# Patient Record
Sex: MALE | Race: WHITE | NOT HISPANIC OR LATINO | Employment: UNEMPLOYED | URBAN - METROPOLITAN AREA
[De-identification: names, ages, dates, MRNs, and addresses within clinical notes are randomized per-mention and may not be internally consistent; named-entity substitution may affect disease eponyms.]

---

## 2017-02-02 DIAGNOSIS — I10 HTN (HYPERTENSION): Primary | ICD-10-CM

## 2017-02-02 RX ORDER — ENALAPRIL MALEATE 20 MG/1
TABLET ORAL
Qty: 60 TABLET | Refills: 11 | Status: SHIPPED | OUTPATIENT
Start: 2017-02-02 | End: 2018-01-26

## 2017-02-27 DIAGNOSIS — E78.1 HYPERTRIGLYCERIDEMIA: ICD-10-CM

## 2017-02-27 DIAGNOSIS — Z21 ASYMPTOMATIC HUMAN IMMUNODEFICIENCY VIRUS (HIV) INFECTION STATUS (H): ICD-10-CM

## 2017-02-27 RX ORDER — FENOFIBRATE 54 MG/1
TABLET ORAL
Qty: 90 TABLET | Refills: 5 | Status: SHIPPED | OUTPATIENT
Start: 2017-02-27 | End: 2017-08-30

## 2017-02-27 RX ORDER — LEVOTHYROXINE SODIUM 100 UG/1
TABLET ORAL
Qty: 60 TABLET | Refills: 5 | Status: SHIPPED | OUTPATIENT
Start: 2017-02-27 | End: 2017-08-30

## 2017-02-27 RX ORDER — NEVIRAPINE 200 MG/1
TABLET ORAL
Qty: 60 TABLET | Refills: 11 | Status: SHIPPED | OUTPATIENT
Start: 2017-02-27 | End: 2018-02-08

## 2017-02-27 RX ORDER — LAMIVUDINE AND ZIDOVUDINE 150; 300 MG/1; MG/1
TABLET, FILM COATED ORAL
Qty: 60 TABLET | Refills: 11 | Status: SHIPPED | OUTPATIENT
Start: 2017-02-27 | End: 2018-02-08

## 2017-03-29 DIAGNOSIS — E34.9 HYPOTESTOSTERONISM: ICD-10-CM

## 2017-03-29 RX ORDER — TESTOSTERONE 30 MG/1.5ML
2 SOLUTION TOPICAL DAILY
Qty: 90 ML | Refills: 3 | Status: SHIPPED | OUTPATIENT
Start: 2017-03-29 | End: 2017-08-31

## 2017-04-24 DIAGNOSIS — I10 HYPERTENSION: ICD-10-CM

## 2017-04-24 RX ORDER — AMLODIPINE BESYLATE 5 MG/1
TABLET ORAL
Qty: 30 TABLET | Refills: 11 | Status: SHIPPED | OUTPATIENT
Start: 2017-04-24 | End: 2017-05-17 | Stop reason: ALTCHOICE

## 2017-05-12 ENCOUNTER — HOSPITAL ENCOUNTER (OUTPATIENT)
Facility: CLINIC | Age: 52
Setting detail: SPECIMEN
Discharge: HOME | End: 2017-05-12
Admitting: INTERNAL MEDICINE
Payer: MEDICARE

## 2017-05-12 DIAGNOSIS — B20 HUMAN IMMUNODEFICIENCY VIRUS (HIV) DISEASE (H): ICD-10-CM

## 2017-05-12 LAB
ALBUMIN SERPL-MCNC: 4.4 G/DL (ref 3.4–5)
ALP SERPL-CCNC: 62 U/L (ref 40–150)
ALT SERPL W P-5'-P-CCNC: 22 U/L (ref 0–70)
ANION GAP SERPL CALCULATED.3IONS-SCNC: 5 MMOL/L (ref 3–14)
AST SERPL W P-5'-P-CCNC: 17 U/L (ref 0–45)
BASOPHILS # BLD AUTO: 0.1 10E9/L (ref 0–0.2)
BASOPHILS NFR BLD AUTO: 0.8 %
BILIRUB SERPL-MCNC: 0.4 MG/DL (ref 0.2–1.3)
BUN SERPL-MCNC: 13 MG/DL (ref 7–30)
CALCIUM SERPL-MCNC: 9.1 MG/DL (ref 8.5–10.1)
CD3 CELLS # BLD: 2252 CELLS/UL (ref 603–2990)
CD3 CELLS NFR BLD: 88 % (ref 49–84)
CD3+CD4+ CELLS # BLD: 862 CELLS/UL (ref 441–2156)
CD3+CD4+ CELLS NFR BLD: 34 % (ref 28–63)
CD3+CD4+ CELLS/CD3+CD8+ CLL BLD: 0.59 % (ref 1.4–2.6)
CD3+CD8+ CELLS # BLD: 1483 CELLS/UL (ref 125–1312)
CD3+CD8+ CELLS NFR BLD: 58 % (ref 10–40)
CHLORIDE SERPL-SCNC: 107 MMOL/L (ref 94–109)
CO2 SERPL-SCNC: 28 MMOL/L (ref 20–32)
CREAT SERPL-MCNC: 1.08 MG/DL (ref 0.66–1.25)
DIFFERENTIAL METHOD BLD: ABNORMAL
EOSINOPHIL # BLD AUTO: 0.2 10E9/L (ref 0–0.7)
EOSINOPHIL NFR BLD AUTO: 2.7 %
ERYTHROCYTE [DISTWIDTH] IN BLOOD BY AUTOMATED COUNT: 13.2 % (ref 10–15)
GFR SERPL CREATININE-BSD FRML MDRD: 72 ML/MIN/1.7M2
GLUCOSE SERPL-MCNC: 107 MG/DL (ref 70–99)
HCT VFR BLD AUTO: 35.5 % (ref 40–53)
HGB BLD-MCNC: 12.4 G/DL (ref 13.3–17.7)
IFC SPECIMEN: ABNORMAL
IMM GRANULOCYTES # BLD: 0 10E9/L (ref 0–0.4)
IMM GRANULOCYTES NFR BLD: 0.1 %
IMMUNODEFICIENCY MARKERS SPEC-IMP: ABNORMAL
LIPASE SERPL-CCNC: 124 U/L (ref 73–393)
LYMPHOCYTES # BLD AUTO: 2.5 10E9/L (ref 0.8–5.3)
LYMPHOCYTES NFR BLD AUTO: 35.4 %
MCH RBC QN AUTO: 37.6 PG (ref 26.5–33)
MCHC RBC AUTO-ENTMCNC: 34.9 G/DL (ref 31.5–36.5)
MCV RBC AUTO: 108 FL (ref 78–100)
MONOCYTES # BLD AUTO: 0.5 10E9/L (ref 0–1.3)
MONOCYTES NFR BLD AUTO: 6.3 %
NEUTROPHILS # BLD AUTO: 3.9 10E9/L (ref 1.6–8.3)
NEUTROPHILS NFR BLD AUTO: 54.7 %
NRBC # BLD AUTO: 0 10*3/UL
NRBC BLD AUTO-RTO: 0 /100
PLATELET # BLD AUTO: 346 10E9/L (ref 150–450)
POTASSIUM SERPL-SCNC: 3.8 MMOL/L (ref 3.4–5.3)
PROT SERPL-MCNC: 7.4 G/DL (ref 6.8–8.8)
RBC # BLD AUTO: 3.3 10E12/L (ref 4.4–5.9)
SODIUM SERPL-SCNC: 141 MMOL/L (ref 133–144)
WBC # BLD AUTO: 7.1 10E9/L (ref 4–11)

## 2017-05-12 PROCEDURE — 36415 COLL VENOUS BLD VENIPUNCTURE: CPT | Performed by: INTERNAL MEDICINE

## 2017-05-16 ENCOUNTER — TELEPHONE (OUTPATIENT)
Dept: INFECTIOUS DISEASES | Facility: CLINIC | Age: 52
End: 2017-05-16

## 2017-05-16 LAB
HIV1 RNA # PLAS NAA DL=20: NORMAL {COPIES}/ML
HIV1 RNA SERPL NAA+PROBE-LOG#: NORMAL {LOG_COPIES}/ML

## 2017-05-17 ENCOUNTER — OFFICE VISIT (OUTPATIENT)
Dept: INFECTIOUS DISEASES | Facility: CLINIC | Age: 52
End: 2017-05-17
Attending: INTERNAL MEDICINE
Payer: MEDICARE

## 2017-05-17 VITALS
WEIGHT: 151.6 LBS | TEMPERATURE: 98 F | DIASTOLIC BLOOD PRESSURE: 91 MMHG | BODY MASS INDEX: 22.45 KG/M2 | SYSTOLIC BLOOD PRESSURE: 145 MMHG | HEART RATE: 93 BPM | HEIGHT: 69 IN

## 2017-05-17 DIAGNOSIS — I10 ESSENTIAL HYPERTENSION: ICD-10-CM

## 2017-05-17 DIAGNOSIS — E78.2 MIXED HYPERLIPIDEMIA: ICD-10-CM

## 2017-05-17 DIAGNOSIS — E29.1 HYPOGONADISM IN MALE: ICD-10-CM

## 2017-05-17 DIAGNOSIS — B20 HUMAN IMMUNODEFICIENCY VIRUS (HIV) DISEASE (H): Primary | ICD-10-CM

## 2017-05-17 DIAGNOSIS — E03.9 HYPOTHYROIDISM, UNSPECIFIED TYPE: ICD-10-CM

## 2017-05-17 PROCEDURE — 99213 OFFICE O/P EST LOW 20 MIN: CPT | Mod: ZF

## 2017-05-17 RX ORDER — AMLODIPINE BESYLATE 10 MG/1
10 TABLET ORAL DAILY
Qty: 30 TABLET | Refills: 11 | Status: SHIPPED | OUTPATIENT
Start: 2017-05-17 | End: 2018-02-08

## 2017-05-17 ASSESSMENT — PAIN SCALES - GENERAL: PAINLEVEL: NO PAIN (0)

## 2017-05-17 NOTE — NURSING NOTE
"Chief Complaint   Patient presents with     RECHECK     follow up with christopher YI cma       Initial BP (!) 145/91  Pulse 93  Temp 98  F (36.7  C) (Oral)  Ht 1.753 m (5' 9\")  Wt 68.8 kg (151 lb 9.6 oz)  BMI 22.39 kg/m2 Estimated body mass index is 22.39 kg/(m^2) as calculated from the following:    Height as of this encounter: 1.753 m (5' 9\").    Weight as of this encounter: 68.8 kg (151 lb 9.6 oz).  Medication Reconciliation: complete    "

## 2017-05-17 NOTE — MR AVS SNAPSHOT
After Visit Summary   5/17/2017    Dennys Bee    MRN: [id number]           Patient Information     Date Of Birth          1965        Visit Information        Provider Department      5/17/2017 5:00 PM Rc Latham MD [facility] and Infectious Diseases        Today's Diagnoses     Human immunodeficiency virus (HIV) disease (H)    -  1    Essential hypertension           Follow-ups after your visit        Follow-up notes from your care team     Return in about 6 months (around 11/17/2017).      Your next 10 appointments already scheduled     Oct 13, 2017  3:00 PM CDT   LAB with  LAB   [facility] ([facility])    [address] [place] [id number] [phone]           Patient must bring picture ID.  Patient should be prepared to give a urine specimen  Please do not eat 10-12 hours before your appointment if you are coming in fasting for labs on lipids, cholesterol, or glucose (sugar).  Pregnant women should follow their Care Team instructions. Water with medications is okay. Do not drink coffee or other fluids.   If you have concerns about taking  your medications, please ask at office or if scheduling via [employer], send a message by clicking on Secure Messaging, Message Your Care Team.            Oct 18, 2017  6:00 PM CDT   (Arrive by 5:45 PM)   Return Visit with Rc Latham MD   [facility] and Infectious Diseases ([facility])    [address] [place] [id number] [phone]              Who to contact     If you have questions or need follow up information about today's clinic visit or your schedule please contact [facility] AND INFECTIOUS DISEASES directly at [phone].  Normal or non-critical lab and imaging results will be communicated to you by MyChart, letter or phone within 4 business days after the clinic has received  "the results. If you do not hear from us within 7 days, please contact the clinic through [employer] or phone. If you have a critical or abnormal lab result, we will notify you by phone as soon as possible.  Submit refill requests through [employer] or call your pharmacy and they will forward the refill request to us. Please allow 3 business days for your refill to be completed.          Additional Information About Your Visit        [employer] Information     [employer] gives you secure access to your electronic health record. If you see a primary care provider, you can also send messages to your care team and make appointments. If you have questions, please call your primary care clinic.  If you do not have a primary care provider, please call [phone] and they will assist you.        Care EveryWhere ID     This is your Care EveryWhere ID. This could be used by other organizations to access your [facility] medical records  [id number]        Your Vitals Were     Pulse Temperature Height BMI (Body Mass Index)          93 98  F (36.7  C) (Oral) 1.753 m (5' 9\") 22.39 kg/m2         Blood Pressure from Last 3 Encounters:   05/17/17 (!) 145/91   11/16/16 (!) 157/91   05/18/16 129/77    Weight from Last 3 Encounters:   05/17/17 68.8 kg (151 lb 9.6 oz)   11/16/16 68.5 kg (151 lb)   05/18/16 69 kg (152 lb 3.2 oz)              Today, you had the following     No orders found for display         Today's Medication Changes          These changes are accurate as of: 5/17/17  5:46 PM.  If you have any questions, ask your nurse or doctor.               These medicines have changed or have updated prescriptions.        Dose/Directions    amLODIPine 10 MG tablet   Commonly known as:  NORVASC   This may have changed:  See the new instructions.   Used for:  Essential hypertension   Changed by:  Rc Latham MD        Dose:  10 mg   Take 1 tablet (10 mg) by mouth daily   Quantity:  30 tablet   Refills:  11            Where to get your " medicines      These medications were sent to [facility] access hospital - [place], [place] - [phone]  [phone], [facility] [address]    Hours:  TRANSPLANT PHONE NUMBER [phone] Phone:  [phone]     amLODIPine 10 MG tablet                Primary Care Provider Office Phone # Fax #    Rc Latham [phone]        [facility] [address]   [facility] [address]        Thank you!     Thank you for choosing [facility] AND INFECTIOUS DISEASES  for your care. Our goal is always to provide you with excellent care. Hearing back from our patients is one way we can continue to improve our services. Please take a few minutes to complete the written survey that you may receive in the mail after your visit with us. Thank you!             Your Updated Medication List - Protect others around you: Learn how to safely use, store and throw away your medicines at www.disposemymeds.org.          This list is accurate as of: 5/17/17  5:46 PM.  Always use your most recent med list.                   Brand Name Dispense Instructions for use    amLODIPine 10 MG tablet    NORVASC    30 tablet    Take 1 tablet (10 mg) by mouth daily       aspirin 81 MG tablet      Take 1 tablet by mouth daily.       buPROPion 150 MG 12 hr tablet    WELLBUTRIN SR     Take 150 mg by mouth 3 times daily.       * enalapril 10 MG tablet    VASOTEC    60 tablet    Take 2 tablets (20 mg) by mouth 2 times daily       * enalapril 20 MG tablet    VASOTEC    60 tablet    TAKE ONE TABLET BY MOUTH TWICE A DAY       fenofibrate 54 MG tablet     90 tablet    TAKE THREE TABLETS BY MOUTH EVERY DAY       lamiVUDine-zidovudine 150-300 MG per tablet    COMBIVIR    60 tablet    TAKE ONE TABLET BY MOUTH TWICE A DAY       LAMOTRIGINE PO      Take  by mouth. Take 2 and 1/2 tablets daily       levothyroxine 100 MCG tablet    SYNTHROID/LEVOTHROID    60 tablet    TAKE TWO TABLETS BY  MOUTH EVERY DAY       Multi-vitamin Tabs tablet      Take 1 tablet by mouth daily       nevirapine 200 MG tablet    VIRAMUNE    60 tablet    TAKE ONE TABLET BY MOUTH EVERY 12 HOURS       tadalafil 20 MG tablet    CIALIS    6 tablet    Take 1 tablet (20 mg) by mouth daily as needed       testosterone 30 MG/ACT topical solution    AXIRON    90 mL    Place 2 pumps (60 mg) onto the skin daily       UNABLE TO FIND      10 mg daily MEDICATION NAME: Brintellix       * Notice:  This list has 2 medication(s) that are the same as other medications prescribed for you. Read the directions carefully, and ask your doctor or other care provider to review them with you.

## 2017-05-23 NOTE — PROGRESS NOTES
Division of Infectious Diseases and International Medicine  Department of Medicine        [facility] OUTPATIENT VISIT NOTE [place] [address]  [address] [place] [address]  Office: [phone]  Fax:  [phone]     HISTORY OF PRESENT ILLNESS:    This 51 year old gentleman with asymptomatic HIV infection returns today to clinic to follow-up chronic antiretroviral therapy with Combivir one tablet PO BID plus nevirapine 200 mg PO BID (started 5/03) as well as other long-term medical concerns.  Since his last appointment here on 11/16/16 he believes he has not missed any Combivir / nevirapine doses and he reports no drug ill effects.  He had HIV laboratory studies drawn last week and is interested in the results.  Lipid, TSH, and testosterone studies were not drawn this time -- we will repeat those next time.   For hypothyroidism, he continues to take Synthroid 200 mcg / day (since 3/14) and also uses the topical testosterone replacement, Axiron, two 30 mg pumps for hypotestosteronism / erectile dysfunction without side effects or concerns.  His triglycerides on 11/11/16 were much improved (on ongoing fenofibrate 162 mg daily, he does not tolerate statins) versus pre-2015 when he was on high doses of testosterone replacement.  His agoraphobia, depression, and anxiety appear to be in stable control / remission and he continues to see his psychiatrist in [place], Dr. Johnston.  He remains on amlodipine 5 mg daily (since 5/20/15) plus lisinopril (20 mg PO BID since 1/15) but has not checked any recent out-of-office blood pressures.  He had not aerobically exercised very much in recent months.  His clinic check-in pressure today is again mildly elevated (145 / 91).  He generally feels healthy of late without new complaints including no fever, chills, night sweats, anorexia, increased fatigue, EENT symptoms, cough, dyspnea, chest pain, nausea, abdominal pain, diarrhea, rash, myalgias /  arthralgias, genitourinary symptoms, headache, or other neurological symptoms.    PAST MEDICAL HISTORY:    1. HIV, diagnosed 7/93, previously treated with AZT, 3TC and Crixivan. He was then on AZT plus efavirenz, but in 5/03 began Combivir/Sustiva and has remained virologically suppressed on this regimen since.  2. Stable chronic stage 1 kidney disease:  Nephrology Clinic evaluation (including negative MRI scan) unremarkable in 2009.  3. Hyperlipidemia previously requiring multiple medications, more hypertriglyceridemia than hypercholesterolemia -- now only on fenofibrate.   4. Hyperthyroidism on levothyroxine.   5. History of difficult-to-treat anxiety and depression -- followed for a number of years by psychiatrist Dr. Abebe Johnston in [place].   6. Hypertension treated with Vasotec.   7. Acute appendicitis with rupture in the summer of 2007.  8. Cholecystectomy in the summer of 2007.   9. 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  Followed there annually.   10. 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  Previously received Sculptra.   11. History of a low testosterone level, most recently checked in 1/09 with a normal free testosterone of 14.0 and a total testosterone in the normal range of 482, managed somewhat unorthodoxly by a now-retired Urologist until 7/13.  Since then, has continued taking biweekly home exogenous testosterone IM injections and has tapered them only marginally from ~ weekly to ~ every other week (or a bit less frequent).  Total testosterone level on 5/14/14 was 2926.  12. Syphilis diagnosed 5/08, peak titer of 1:256, completed a treatment course of IM penicillin times 3, last dose in 6/08.  13. Prior rectal warts, treated with Aldara cream.  14. Infected lower left cracked mandibular molar tooth, repaired  6/10.  15. Unintentional overdose of alcohol, hydrocodone, and clonazepam for which hospitalized in [place] in 10/12.  16. Mild intermittent controlled extensor limb psoriasis.  17. Sleep apnea, diagnosed elsewhere.    ALLERGIES:  PCN and iodine    CURRENT MEDICATIONS:  Current Outpatient Prescriptions   Medication Sig Dispense Refill     amLODIPine (NORVASC) 10 MG tablet Take 1 tablet (10 mg) by mouth daily 30 tablet 11     testosterone (AXIRON) 30 MG/ACT topical solution Place 2 pumps (60 mg) onto the skin daily 90 mL 3     fenofibrate 54 MG tablet TAKE THREE TABLETS BY MOUTH EVERY DAY 90 tablet 5     lamiVUDine-zidovudine (COMBIVIR) 150-300 MG per tablet TAKE ONE TABLET BY MOUTH TWICE A DAY 60 tablet 11     nevirapine (VIRAMUNE) 200 MG tablet TAKE ONE TABLET BY MOUTH EVERY 12 HOURS 60 tablet 11     levothyroxine (SYNTHROID/LEVOTHROID) 100 MCG tablet TAKE TWO TABLETS BY MOUTH EVERY DAY 60 tablet 5     enalapril (VASOTEC) 20 MG tablet TAKE ONE TABLET BY MOUTH TWICE A DAY 60 tablet 11     UNABLE TO FIND 10 mg daily MEDICATION NAME: Brintellix       enalapril (VASOTEC) 10 MG tablet Take 2 tablets (20 mg) by mouth 2 times daily 60 tablet 11     multivitamin, therapeutic with minerals (MULTI-VITAMIN) TABS Take 1 tablet by mouth daily       tadalafil (CIALIS) 20 MG tablet Take 1 tablet (20 mg) by mouth daily as needed 6 tablet 3     aspirin 81 MG tablet Take 1 tablet by mouth daily.       buPROPion (WELLBUTRIN SR) 150 MG 12 hr tablet Take 150 mg by mouth 3 times daily.       LAMOTRIGINE PO Take  by mouth. Take 2 and 1/2 tablets daily       FAMILY HISTORY:  Strong family history of depression.    SOCIAL HISTORY:  Lives alone in basement apartment in [place] abut spends much of his time at his parents home in [place] in the town he grew up in and where all his siblings still live.  Unemployed, on disability based on his depression diagnosis.  Sexually active, no single steady partner, mostly with other HIV  "seropositive men, but always discloses his status when with HIV seronegative partners and uses protection.  In the past, has visited friends in [place] for an extended stay.  Tobacco:  Long-standing, up to 2.5 ppd in the past.  Presently 1 ppd (but smokes only half of each cigarette).  Has quit for up to three weeks several times in the past; not interested in further attempts at reduction at present.    REVIEW OF SYSTEMS:  As per HPI.  Complete ROS is otherwise negative.    PHYSICAL EXAMINATION:  General:  A pleasant, conversant, WDWN 51-year-old man in no discomfort.  Vital Signs:  BP (!) 145/91  Pulse 93  Temp 98  F (36.7  C) (Oral)  Ht 1.753 m (5' 9\")  Wt 68.8 kg (151 lb 9.6 oz)  BMI 22.39 kg/m2 (weight stable).  Skin:  No acute rash or lesion.  Head/Neck:  NCAT.  Old buccal facial mild lipoatrophy is unchanged.  External auditory canals are patent bilaterally without discharge.  PERRL. EOMI.  Anicteric sclerae.  Conjunctivae are pink and uninjected.  Oropharynx shows no erythema, exudate or lesion.  Neck is supple without lymphadenopathy or thyromegaly.  Lungs:  Bilaterally clear to auscultation.  CV:  Regular rate and rhythm, normal S1, S2 without gallop, murmur or rub.  Abdomen:  Bowel sounds present, soft, nontender, no hepatomegaly, old surgical scar.  Back:  No low spinal or CVA tenderness.  Extremities:  Distally warm, no edema.  Neurological:  Alert, oriented, memory / cognition / affect intact.  Gait is normal    LABORATORY STUDIES (Reviewed with the patient during his appointment today):      Sodium 05/12/2017 141  133 - 144 mmol/L Final     Potassium 05/12/2017 3.8  3.4 - 5.3 mmol/L Final     Chloride 05/12/2017 107  94 - 109 mmol/L Final     Carbon Dioxide 05/12/2017 28  20 - 32 mmol/L Final     Anion Gap 05/12/2017 5  3 - 14 mmol/L Final     Glucose 05/12/2017 107* 70 - 99 mg/dL Final     Urea Nitrogen 05/12/2017 13  7 - 30 mg/dL Final     Creatinine 05/12/2017 1.08  0.66 - 1.25 mg/dL Final "     GFR Estimate 05/12/2017 72  >60 mL/min/1.7m2 Final    Non  GFR Calc     GFR Estimate If Black 05/12/2017 88  >60 mL/min/1.7m2 Final    African American GFR Calc     Calcium 05/12/2017 9.1  8.5 - 10.1 mg/dL Final     Bilirubin Total 05/12/2017 0.4  0.2 - 1.3 mg/dL Final     Albumin 05/12/2017 4.4  3.4 - 5.0 g/dL Final     Protein Total 05/12/2017 7.4  6.8 - 8.8 g/dL Final     Alkaline Phosphatase 05/12/2017 62  40 - 150 U/L Final     ALT 05/12/2017 22  0 - 70 U/L Final     AST 05/12/2017 17  0 - 45 U/L Final     WBC 05/12/2017 7.1  4.0 - 11.0 10e9/L Final     RBC Count 05/12/2017 3.30* 4.4 - 5.9 10e12/L Final     Hemoglobin 05/12/2017 12.4* 13.3 - 17.7 g/dL Final     Hematocrit 05/12/2017 35.5* 40.0 - 53.0 % Final     MCV 05/12/2017 108* 78 - 100 fl Final     MCH 05/12/2017 37.6* 26.5 - 33.0 pg Final     MCHC 05/12/2017 34.9  31.5 - 36.5 g/dL Final     RDW 05/12/2017 13.2  10.0 - 15.0 % Final     Platelet Count 05/12/2017 346  150 - 450 10e9/L Final     Diff Method 05/12/2017 Automated Method   Final     % Neutrophils 05/12/2017 54.7  % Final     % Lymphocytes 05/12/2017 35.4  % Final     % Monocytes 05/12/2017 6.3  % Final     % Eosinophils 05/12/2017 2.7  % Final     % Basophils 05/12/2017 0.8  % Final     % Immature Granulocytes 05/12/2017 0.1  % Final     Nucleated RBCs 05/12/2017 0  0 /100 Final     Absolute Neutrophil 05/12/2017 3.9  1.6 - 8.3 10e9/L Final     Absolute Lymphocytes 05/12/2017 2.5  0.8 - 5.3 10e9/L Final     Absolute Monocytes 05/12/2017 0.5  0.0 - 1.3 10e9/L Final     Absolute Eosinophils 05/12/2017 0.2  0.0 - 0.7 10e9/L Final     Absolute Basophils 05/12/2017 0.1  0.0 - 0.2 10e9/L Final     Abs Immature Granulocytes 05/12/2017 0.0  0 - 0.4 10e9/L Final     Absolute Nucleated RBC 05/12/2017 0.0   Final     HIV-1 RNA Quant Result 05/12/2017   HIVND [Copies]/mL Final                    Value:HIV-1 RNA Not Detected   The BEV AmpliPrep/BEV TaqMan HIV-1 test is an FDA-approved  in vitro nucleic   acid amplification test for the quantitation of HIV-1 RNA in human plasma (EDTA   plasma) using the BEV AmpliPrep instrument for automated viral nucleic acid   extraction and the BEV TaqMan Analyzer or BEV TaqMan for automated Real   Time PCR amplification and detection of the viral nucleic acid target.   Titer results are reported in copies/ml. This assay is intended for use in   conjunction with clinical presentation and other laboratory markers of disease   prognosis and for use as an aid in assessing viral response to antiretroviral   treatment as measured by changes in plasma HIV-1 RNA levels. This test should   not be used as a donor screening test to confirm the presence of HIV-1   infection.       HIV RNA QT Log 05/12/2017 Not Calculated  <1.3 [Log_copies]/mL Final     IFC Specimen 05/12/2017 Blood   Final     CD3 Mature T 05/12/2017 88* 49 - 84 % Final     CD4 Angora T 05/12/2017 34  28 - 63 % Final     CD8 Suppressor T 05/12/2017 58* 10 - 40 % Final     CD4:CD8 Ratio 05/12/2017 0.59* 1.40 - 2.60 Final     Absolute CD3 05/12/2017 2252  603 - 2990 cells/uL Final     Absolute CD4 05/12/2017 862  441 - 2156 cells/uL Final     Absolute CD8 05/12/2017 1483* 125 - 1312 cells/uL Final     T Cell Subset Profile Antibody Int* 05/12/2017 << Do Not Report >>   Final     Lipase 05/12/2017 124  73 - 393 U/L Final     ASSESSMENT/PLAN:    1. Stable HIV infection:  On long-standing Combivir / nevirapine he maintains a normal absolute CD4+ cell count and undetectable HIV plasma viral load with tight adherence and good drug tolerance.  He will continue on this regimen and return to this clinic for follow-up in about six months, or as needed before then.  I pre-ordered his laboratory studies for his next pre-appointment lab draw today.  2. Essential hypertension:  Two recent check-in blood pressures have been mildly today despite continued amlodipine 5 mg PO daily (since 5/20/15) plus enalapril 20 mg  PO BID (dose increased on 1/27/15) -- I do not have out-of-office blood pressure reading from him to evaluate.  After discussion today, it seems prudent to increase his amlodipine dose to 10 mg PO daily (prescription sent) and continue the same enalapril dose.  I asked him to check three home blood pressures over the next month on this increased dose and transmit them to me and also to let me know right away if he develops dizziness, lightheadedness, or other possible symptoms of hypotension.  Once again, I promoted taking up a regular aerobic exercise plan during these coming warmer months.  3. Treated hypothyroidism:  He feels well and his TSH was normal on 11/11/16 -- will continue the same levothyroxine dose of 200 mcg / day dose (which was lowered in 3/14) and recheck a TSH in six months.  4. Testosterone replacement therapy / previous erectile dysfunction:  His most recent total testosterone level on 11/11/16 was excellent at 522 and he notices no symptoms of hypotestosteronism on Axiron  transcutaneous testosterone topical replacement therapy at two (30 mg) pumps (= 60 mg) daily (since late 1/15).  Will continue that and recheck an ~ annual total testosterone at next blood draw.  5. Fairly well-controlled hyperlipidemia:  His 11/11/16 total cholesterol (217) and LDL (126) were stable versus the last check and improved complared to 1/16/15 for uncertain reasons (although perhaps related to switched psychiatric medications and to some mild weight loss).  His fasting triglycerides were also improved at 127 (and dramatically better than a decade ago) on ongoing fenofibrate 162 mg daily.  He is not on a statin (with past poor tolerance) and does not wish to try any statin again.  As above, encouraged regular aerobic exercise.  6. Chronic, intractable depression / anxiety:  By conversation, he seems to me to be in some degree of remission over the past 1.5 years.  Remains, I believe, on armodafinil (Nuvigil) since  early 1/14 through Dr. Johnston, as well as bupropion and lamotrigine.  7. Improved previously elevated creatinine:  He has a history of mild chronic kidney insufficiency with past creatinines usually in the 1.2 - 1.5 range since 2005, but the creatinine has been lower at ~ 1.1 over the past 1.5 years (perhaps because he is no longer taking supra-therapeutic testosterone doses).  8. History of prior mild mid-winter vitamin D deficiency:  He self-takes OTC vitamin D supplements most white including this past winter, although whether he needs that is questionable, because a  11/12/15 vitamin D level was adequate at 24.  We discussed that he may not really need such supplementation, but he does not take enough to be harmful.  9. Mild psoriasis:  Well-controlled with topical Clobex prn.  Not discussed today.  10. Health Care Maintenance:  Influenza vaccine given 11/16/16.  Menactra vaccine given 11/18/15.  Tdap was boosted 1/21/15.  Prevnar 13 given 10/16/13; Pneumovax given 1/21/15.  HAV / HBV immunized / seroimmune.  HCV negative 6/1/09.  Negative rectal Pap done by Dr. Sanchez 6/28/10.  Repeat RPR was negative on 11/12/15; will repeat with next blood draw (had a positive antitreponemal antibody on 8/27/09).  Quantiferon Gold was negative 11/12/15.  Sees a dentist > annually.  Sees a sleep apnea specialist for Cpap.  Saw an ophthalmologist in early 2012.

## 2017-08-30 DIAGNOSIS — E78.1 HYPERTRIGLYCERIDEMIA: ICD-10-CM

## 2017-08-30 RX ORDER — LEVOTHYROXINE SODIUM 100 UG/1
TABLET ORAL
Qty: 60 TABLET | Refills: 11 | Status: SHIPPED | OUTPATIENT
Start: 2017-08-30 | End: 2018-09-06

## 2017-08-30 RX ORDER — FENOFIBRATE 54 MG/1
TABLET ORAL
Qty: 90 TABLET | Refills: 11 | Status: SHIPPED | OUTPATIENT
Start: 2017-08-30 | End: 2018-02-08

## 2017-08-31 DIAGNOSIS — E34.9 HYPOTESTOSTERONISM: ICD-10-CM

## 2017-08-31 RX ORDER — TESTOSTERONE 30 MG/1.5ML
2 SOLUTION TOPICAL DAILY
Qty: 90 ML | Refills: 3 | Status: SHIPPED | OUTPATIENT
Start: 2017-08-31 | End: 2017-12-29

## 2017-10-13 DIAGNOSIS — B20 HUMAN IMMUNODEFICIENCY VIRUS (HIV) DISEASE (H): ICD-10-CM

## 2017-10-13 DIAGNOSIS — E29.1 HYPOGONADISM IN MALE: ICD-10-CM

## 2017-10-13 DIAGNOSIS — E03.9 HYPOTHYROIDISM, UNSPECIFIED TYPE: ICD-10-CM

## 2017-10-13 DIAGNOSIS — E78.2 MIXED HYPERLIPIDEMIA: ICD-10-CM

## 2017-10-13 LAB
ALBUMIN SERPL-MCNC: 4.4 G/DL (ref 3.4–5)
ALP SERPL-CCNC: 70 U/L (ref 40–150)
ALT SERPL W P-5'-P-CCNC: 35 U/L (ref 0–70)
ANION GAP SERPL CALCULATED.3IONS-SCNC: 9 MMOL/L (ref 3–14)
AST SERPL W P-5'-P-CCNC: 22 U/L (ref 0–45)
BASOPHILS # BLD AUTO: 0.1 10E9/L (ref 0–0.2)
BASOPHILS NFR BLD AUTO: 1 %
BILIRUB SERPL-MCNC: 0.6 MG/DL (ref 0.2–1.3)
BUN SERPL-MCNC: 14 MG/DL (ref 7–30)
CALCIUM SERPL-MCNC: 9 MG/DL (ref 8.5–10.1)
CD3 CELLS # BLD: 2452 CELLS/UL (ref 603–2990)
CD3 CELLS NFR BLD: 89 % (ref 49–84)
CD3+CD4+ CELLS # BLD: 964 CELLS/UL (ref 441–2156)
CD3+CD4+ CELLS NFR BLD: 35 % (ref 28–63)
CD3+CD4+ CELLS/CD3+CD8+ CLL BLD: 0.6 % (ref 1.4–2.6)
CD3+CD8+ CELLS # BLD: 1586 CELLS/UL (ref 125–1312)
CD3+CD8+ CELLS NFR BLD: 58 % (ref 10–40)
CHLORIDE SERPL-SCNC: 105 MMOL/L (ref 94–109)
CHOLEST SERPL-MCNC: 225 MG/DL
CO2 SERPL-SCNC: 24 MMOL/L (ref 20–32)
CREAT SERPL-MCNC: 1.15 MG/DL (ref 0.66–1.25)
DIFFERENTIAL METHOD BLD: ABNORMAL
EOSINOPHIL # BLD AUTO: 0.3 10E9/L (ref 0–0.7)
EOSINOPHIL NFR BLD AUTO: 3.2 %
ERYTHROCYTE [DISTWIDTH] IN BLOOD BY AUTOMATED COUNT: 12.9 % (ref 10–15)
GFR SERPL CREATININE-BSD FRML MDRD: 67 ML/MIN/1.7M2
GLUCOSE SERPL-MCNC: 100 MG/DL (ref 70–99)
HCT VFR BLD AUTO: 35.2 % (ref 40–53)
HDLC SERPL-MCNC: 61 MG/DL
HGB BLD-MCNC: 12.5 G/DL (ref 13.3–17.7)
IFC SPECIMEN: ABNORMAL
IMM GRANULOCYTES # BLD: 0 10E9/L (ref 0–0.4)
IMM GRANULOCYTES NFR BLD: 0.2 %
LDLC SERPL CALC-MCNC: 125 MG/DL
LIPASE SERPL-CCNC: 148 U/L (ref 73–393)
LYMPHOCYTES # BLD AUTO: 2.9 10E9/L (ref 0.8–5.3)
LYMPHOCYTES NFR BLD AUTO: 34.8 %
MCH RBC QN AUTO: 37.5 PG (ref 26.5–33)
MCHC RBC AUTO-ENTMCNC: 35.5 G/DL (ref 31.5–36.5)
MCV RBC AUTO: 106 FL (ref 78–100)
MONOCYTES # BLD AUTO: 0.5 10E9/L (ref 0–1.3)
MONOCYTES NFR BLD AUTO: 6.1 %
NEUTROPHILS # BLD AUTO: 4.5 10E9/L (ref 1.6–8.3)
NEUTROPHILS NFR BLD AUTO: 54.7 %
NONHDLC SERPL-MCNC: 164 MG/DL
NRBC # BLD AUTO: 0 10*3/UL
NRBC BLD AUTO-RTO: 0 /100
PLATELET # BLD AUTO: 335 10E9/L (ref 150–450)
POTASSIUM SERPL-SCNC: 3.9 MMOL/L (ref 3.4–5.3)
PROT SERPL-MCNC: 7.7 G/DL (ref 6.8–8.8)
RBC # BLD AUTO: 3.33 10E12/L (ref 4.4–5.9)
SODIUM SERPL-SCNC: 138 MMOL/L (ref 133–144)
TRIGL SERPL-MCNC: 196 MG/DL
TSH SERPL DL<=0.005 MIU/L-ACNC: 0.75 MU/L (ref 0.4–4)
WBC # BLD AUTO: 8.2 10E9/L (ref 4–11)

## 2017-10-16 LAB — RPR SER-TITR: NEGATIVE {TITER}

## 2017-10-17 LAB
HIV1 RNA # PLAS NAA DL=20: NORMAL {COPIES}/ML
HIV1 RNA SERPL NAA+PROBE-LOG#: NORMAL {LOG_COPIES}/ML
TESTOST SERPL-MCNC: 392 NG/DL (ref 240–950)

## 2017-10-18 ENCOUNTER — OFFICE VISIT (OUTPATIENT)
Dept: INFECTIOUS DISEASES | Facility: CLINIC | Age: 52
End: 2017-10-18
Attending: INTERNAL MEDICINE
Payer: MEDICARE

## 2017-10-18 VITALS
DIASTOLIC BLOOD PRESSURE: 87 MMHG | BODY MASS INDEX: 23.25 KG/M2 | HEIGHT: 69 IN | TEMPERATURE: 98.3 F | SYSTOLIC BLOOD PRESSURE: 130 MMHG | HEART RATE: 89 BPM | WEIGHT: 157 LBS

## 2017-10-18 DIAGNOSIS — N40.1 BENIGN PROSTATIC HYPERPLASIA WITH NOCTURIA: ICD-10-CM

## 2017-10-18 DIAGNOSIS — Z00.00 HEALTH CARE MAINTENANCE: ICD-10-CM

## 2017-10-18 DIAGNOSIS — R07.89 CHEST HEAVINESS: ICD-10-CM

## 2017-10-18 DIAGNOSIS — R35.1 NOCTURIA: ICD-10-CM

## 2017-10-18 DIAGNOSIS — Z21 HUMAN IMMUNODEFICIENCY VIRUS I INFECTION (H): Primary | ICD-10-CM

## 2017-10-18 DIAGNOSIS — R00.0 TACHYCARDIA: ICD-10-CM

## 2017-10-18 DIAGNOSIS — Z23 NEED FOR VACCINATION: ICD-10-CM

## 2017-10-18 DIAGNOSIS — R35.1 BENIGN PROSTATIC HYPERPLASIA WITH NOCTURIA: ICD-10-CM

## 2017-10-18 DIAGNOSIS — I10 ESSENTIAL HYPERTENSION: ICD-10-CM

## 2017-10-18 LAB
ALBUMIN UR-MCNC: NEGATIVE MG/DL
APPEARANCE UR: CLEAR
BILIRUB UR QL STRIP: NEGATIVE
COLOR UR AUTO: YELLOW
GLUCOSE UR STRIP-MCNC: NEGATIVE MG/DL
HGB UR QL STRIP: NEGATIVE
KETONES UR STRIP-MCNC: NEGATIVE MG/DL
LEUKOCYTE ESTERASE UR QL STRIP: NEGATIVE
MUCOUS THREADS #/AREA URNS LPF: PRESENT /LPF
NITRATE UR QL: NEGATIVE
PH UR STRIP: 6 PH (ref 5–7)
RBC #/AREA URNS AUTO: 2 /HPF (ref 0–2)
SOURCE: ABNORMAL
SP GR UR STRIP: 1.01 (ref 1–1.03)
UROBILINOGEN UR STRIP-MCNC: 0 MG/DL (ref 0–2)
WBC #/AREA URNS AUTO: 2 /HPF (ref 0–2)

## 2017-10-18 PROCEDURE — G0008 ADMIN INFLUENZA VIRUS VAC: HCPCS | Mod: ZF

## 2017-10-18 PROCEDURE — 81001 URINALYSIS AUTO W/SCOPE: CPT | Performed by: INTERNAL MEDICINE

## 2017-10-18 PROCEDURE — 25000128 H RX IP 250 OP 636: Mod: ZF | Performed by: INTERNAL MEDICINE

## 2017-10-18 PROCEDURE — 99213 OFFICE O/P EST LOW 20 MIN: CPT | Mod: 25,ZF

## 2017-10-18 PROCEDURE — 90686 IIV4 VACC NO PRSV 0.5 ML IM: CPT | Mod: ZF | Performed by: INTERNAL MEDICINE

## 2017-10-18 PROCEDURE — 93010 ELECTROCARDIOGRAM REPORT: CPT | Performed by: INTERNAL MEDICINE

## 2017-10-18 PROCEDURE — 93005 ELECTROCARDIOGRAM TRACING: CPT | Mod: ZF

## 2017-10-18 RX ADMIN — INFLUENZA A VIRUS A/MICHIGAN/45/2015 X-275 (H1N1) ANTIGEN (FORMALDEHYDE INACTIVATED), INFLUENZA A VIRUS A/HONG KONG/4801/2014 X-263B (H3N2) ANTIGEN (FORMALDEHYDE INACTIVATED), INFLUENZA B VIRUS B/PHUKET/3073/2013 ANTIGEN (FORMALDEHYDE INACTIVATED), AND INFLUENZA B VIRUS B/BRISBANE/60/2008 ANTIGEN (FORMALDEHYDE INACTIVATED) 0.5 ML: 15; 15; 15; 15 INJECTION, SUSPENSION INTRAMUSCULAR at 20:01

## 2017-10-18 ASSESSMENT — PAIN SCALES - GENERAL: PAINLEVEL: MILD PAIN (3)

## 2017-10-18 NOTE — MR AVS SNAPSHOT
After Visit Summary   10/18/2017    Dennys Bee    MRN: [id number]           Patient Information     Date Of Birth          1965        Visit Information        Provider Department      10/18/2017 6:00 PM Rc Latham MD [facility] and Infectious Diseases        Today's Diagnoses     Health care maintenance    -  1    Need for vaccination        Tachycardia        Chest heaviness        Nocturia        Human immunodeficiency virus I infection (H)           Follow-ups after your visit        Follow-up notes from your care team     Return in about 6 months (around 4/18/2018).      Your next 10 appointments already scheduled     Oct 18, 2017  8:00 PM CDT   (Arrive by 7:45 PM)   XR CHEST 2 VIEWS with XR1   [facility] ([facility])    [address] [place] [address] [phone]           Please bring a list of your current medicines to your exam. (Include vitamins, minerals and over-thecounter medicines.) Leave your valuables at home.  Tell your doctor if there is a chance you may be pregnant.  You do not need to do anything special for this exam.            Apr 13, 2018  1:00 PM CDT   LAB with  LAB   [facility] ([facility])    [address] [place] [address] [phone]           Patient must bring picture ID. Patient should be prepared to give a urine specimen  Please do not eat 10-12 hours before your appointment if you are coming in fasting for labs on lipids, cholesterol, or glucose (sugar). Pregnant women should follow their Care Team instructions. Water with medications is okay. Do not drink coffee or other fluids. If you have concerns about taking  your medications, please ask at office or if scheduling via [employer], send a message by clicking on Secure Messaging, Message Your Care Team.            Apr 18, 2018  6:00 PM CDT   (Arrive  "by 5:45 PM)   Return Visit with Rc Latham MD   [facility] and Infectious Diseases ([facility])    [address]  3rd Floor  [facility] [address] [phone]              Future tests that were ordered for you today     Open Future Orders        Priority Expected Expires Ordered    X-ray Chest 2 vws* Routine 10/18/2017 10/18/2018 10/18/2017    Routine UA with micro - Reflex to culture Routine  10/18/2018 10/18/2017            Who to contact     If you have questions or need follow up information about today's clinic visit or your schedule please contact [facility] directly at [phone].  Normal or non-critical lab and imaging results will be communicated to you by Lalalamahart, letter or phone within 4 business days after the clinic has received the results. If you do not hear from us within 7 days, please contact the clinic through [employer] or phone. If you have a critical or abnormal lab result, we will notify you by phone as soon as possible.  Submit refill requests through [employer]Me[employer] or call your pharmacy and they will forward the refill request to us. Please allow 3 business days for your refill to be completed.          Additional Information About Your Visit        SetMeUp Information     [employer] gives you secure access to your electronic health record. If you see a primary care provider, you can also send messages to your care team and make appointments. If you have questions, please call your primary care clinic.  If you do not have a primary care provider, please call [phone] and they will assist you.        Care EveryWhere ID     This is your Care EveryWhere ID. This could be used by other organizations to access your [facility] medical records  [id number]        Your Vitals Were     Pulse Temperature Height BMI (Body Mass Index)          89 98.3  F (36.8  C) (Oral) 1.753 m (5' 9\") 23.18 kg/m2         Blood " Pressure from Last 3 Encounters:   10/18/17 130/87   05/17/17 (!) 145/91   11/16/16 (!) 157/91    Weight from Last 3 Encounters:   10/18/17 71.2 kg (157 lb)   05/17/17 68.8 kg (151 lb 9.6 oz)   11/16/16 68.5 kg (151 lb)              We Performed the Following     EKG 12-lead complete w/read - Clinics          Today's Medication Changes          These changes are accurate as of: 10/18/17  7:26 PM.  If you have any questions, ask your nurse or doctor.               These medicines have changed or have updated prescriptions.        Dose/Directions    enalapril 20 MG tablet   Commonly known as:  VASOTEC   This may have changed:  Another medication with the same name was removed. Continue taking this medication, and follow the directions you see here.   Used for:  HTN (hypertension)   Changed by:  Rc Latham MD        TAKE ONE TABLET BY MOUTH TWICE A DAY   Quantity:  60 tablet   Refills:  11         Stop taking these medicines if you haven't already. Please contact your care team if you have questions.     UNABLE TO FIND   Stopped by:  Rc Latham MD                    Primary Care Provider Office Phone # Fax #    Rc Latham [phone]       [address] [place] [address]        Equal Access to Services     POLLO MILTON AH: Hadkimberly leeo Soomaali, waaxda luqadaha, qaybta kaalmada adeegyada, trell guerrero. So [facility] [phone].    ATENCIÓN: Si habla español, tiene a solomon disposición servicios gratuitos de asistencia lingüística. Llame al [phone].    We comply with applicable federal civil rights laws and [place] laws. We do not discriminate on the basis of race, color, national origin, age, disability, sex, sexual orientation, or gender identity.            Thank you!     Thank you for choosing [facility] AND INFECTIOUS DISEASES  for your care. Our goal is always to provide you with excellent care. Hearing back from  our patients is one way we can continue to improve our services. Please take a few minutes to complete the written survey that you may receive in the mail after your visit with us. Thank you!             Your Updated Medication List - Protect others around you: Learn how to safely use, store and throw away your medicines at www.disposemymeds.org.          This list is accurate as of: 10/18/17  7:26 PM.  Always use your most recent med list.                   Brand Name Dispense Instructions for use Diagnosis    amLODIPine 10 MG tablet    NORVASC    30 tablet    Take 1 tablet (10 mg) by mouth daily    Essential hypertension       aspirin 81 MG tablet      Take 1 tablet by mouth daily.        buPROPion 150 MG 12 hr tablet    WELLBUTRIN SR     Take 150 mg by mouth 3 times daily.        enalapril 20 MG tablet    VASOTEC    60 tablet    TAKE ONE TABLET BY MOUTH TWICE A DAY    HTN (hypertension)       fenofibrate 54 MG tablet     90 tablet    TAKE THREE TABLETS BY MOUTH EVERY DAY    Hypertriglyceridemia       lamiVUDine-zidovudine 150-300 MG per tablet    COMBIVIR    60 tablet    TAKE ONE TABLET BY MOUTH TWICE A DAY    Asymptomatic human immunodeficiency virus (HIV) infection status (H)       LAMOTRIGINE PO      Take  by mouth. Take 2 and 1/2 tablets daily        levothyroxine 100 MCG tablet    SYNTHROID/LEVOTHROID    60 tablet    TAKE TWO TABLETS BY MOUTH EVERY DAY    Hypertriglyceridemia       Multi-vitamin Tabs tablet      Take 1 tablet by mouth daily        nevirapine 200 MG tablet    VIRAMUNE    60 tablet    TAKE ONE TABLET BY MOUTH EVERY 12 HOURS    Asymptomatic human immunodeficiency virus (HIV) infection status (H)       tadalafil 20 MG tablet    CIALIS    6 tablet    Take 1 tablet (20 mg) by mouth daily as needed    Erectile dysfunction       testosterone 30 MG/ACT topical solution    AXIRON    90 mL    Place 2 pumps (60 mg) onto the skin daily    Hypotestosteronism

## 2017-10-18 NOTE — NURSING NOTE
"Chief Complaint   Patient presents with     RECHECK     follow up with B20, tzimmer cma       Initial /87  Pulse 89  Temp 98.3  F (36.8  C) (Oral)  Ht 1.753 m (5' 9\")  Wt 71.2 kg (157 lb)  BMI 23.18 kg/m2 Estimated body mass index is 23.18 kg/(m^2) as calculated from the following:    Height as of this encounter: 1.753 m (5' 9\").    Weight as of this encounter: 71.2 kg (157 lb).  Medication Reconciliation: complete    "

## 2017-10-19 NOTE — NURSING NOTE
Dennys Bee      1.  Has the patient received the information for the influenza vaccine? YES    2.  Does the patient have any of the following contraindications?     Allergy to eggs? No     Allergic reaction to previous influenza vaccines? No     Any other problems to previous influenza vaccines? No     Paralyzed by Guillain-[place] syndrome? No     Currently pregnant? NO     Current moderate or severe illness? No     Allergy to contact lens solution? No    3.  The vaccine has been administered in the usual fashion and the patient was instructed to wait 20 minutes before leaving the building in the event of an allergic reaction: YES    Vaccination given by christopher hua.  Recorded by Dionne Sanabria

## 2017-10-30 LAB — INTERPRETATION ECG - MUSE: NORMAL

## 2017-11-27 NOTE — PROGRESS NOTES
"Division of Infectious Diseases and International Medicine  Department of Medicine        [facility] OUTPATIENT VISIT NOTE [place] Mail Code [address] [place] [address]  Office: [phone]  Fax:  [phone]     HISTORY OF PRESENT ILLNESS:  Mr. Bee is a 52 year old gentleman with asymptomatic HIV infection.  He returns today to [facility] for follow-up of his antiretroviral therapy comprised of Combivir one tablet PO BID and nevirapine 200 mg PO BID (started ) as well as follow-up of other long-term health issues.  Since his last appointment here on 17 he reports he has not missed any antiretorival drug doses.  He lacks medication side effects.  He has generally felt well over the past half-year, but has a couple new concerns.  First, he has noticed an infrequent left chest sensation of \"fullness\" over the past year or so and his chest sometimes feels tight with a deep breath.  This is not associated with exertion and is not accompanied by helen pain, dyspnea, nausea, sweats, or any other symptom.  It resolves spontaneously after a brief period.  It tends to occur more noticeably when he thinks about it.  It happens maybe a couple of times a month and does not seem to be increasing in frequency, duration, or intensity over the past year, but he is concerned about it because his maternal grandfather  suddenly of a myocardial infarction at age 51 and there is a strong family history of hypercholesterolemia.  He also has noticed bouts of rapid heart beat that he can feel when he palpates his pulse over recent months.  This seems to happen daily.  He thinks at these times his heart rate is well over 130, but he is not sure he is counting correctly.  Sometimes he thinks he can feel it as palpitations.  This tends to occur when he is anxious but is not associated with exertion, any particular time of day, or any particular activity.  He has also noticed an " occasional dull ache on the left side of his scrotum lately and wonders if this might be epididymitis, which he thinks he was previously treated for by his (license-suspended) former urologist a number of years ago.  He has continued to have nocturia several times per night over the past year or so -- he thinks his bladder is not fully emptying.  With a history of anxiety and insomnia, he tends to be a light sleeper with frequent awakenings, however, so he is not certain whether an urge to void is waking him or rather that he wakes and then decides he needs to void (probably the latter).  He lacks daytime urinary frequency and also has no dysuria, daytime urgency, hematuria, prostatic ache, or genital discharge.  For his chronic hypothyroidism, he continues to take Synthroid 200 mcg / day (since 3/14) and also uses the topical testosterone replacement, Axiron, two 30 mg pumps for hypotestosteronism / erectile dysfunction without side effects or concerns.  He continues to have poor libido and sexual function.  His triglycerides on 11/11/16 were much improved (on ongoing fenofibrate 162 mg daily, he does not tolerate statins) versus pre-2015 when he was on high doses of testosterone replacement.  His agoraphobia, depression, and anxiety appear to be in stable control / remission and he continues to see his psychiatrist in [place], Dr. Johnston.  He is on an increased dose of amlodipine 10 mg daily (increased 5/17/17, started 5/20/15) plus lisinopril (20 mg PO BID since 1/15) but has not checked any recent out-of-office blood pressures.  He had not aerobically exercised very much in recent months.  His clinic check-in pressure today is again mildly elevated (145 / 91).  Beyond all these issues, he has otherweise felt well lately and no additional complaints including no recent fever, chills, night sweats, anorexia, increased fatigue, EENT symptoms, cough, resting or exertional dyspnea, helen chest pain, nausea, abdominal  pain, diarrhea, rash, myalgias / arthralgias, other genitourinary symptoms, headache, or other neurological symptoms.    PAST MEDICAL HISTORY:    1. HIV, diagnosed 7/93, previously treated with AZT, 3TC and Crixivan. He was then on AZT plus efavirenz, but in 5/03 began Combivir/Sustiva and has remained virologically suppressed on this regimen since.  2. Stable chronic stage 1 kidney disease:  Nephrology Clinic evaluation (including negative MRI scan) unremarkable in 2009.  3. Hyperlipidemia previously requiring multiple medications, more hypertriglyceridemia than hypercholesterolemia -- now only on fenofibrate.   4. Hyperthyroidism on levothyroxine.   5. History of difficult-to-treat anxiety and depression -- followed for a number of years by psychiatrist Dr. Abebe Johnston in [place].   6. Hypertension treated with Vasotec.   7. Acute appendicitis with rupture in the summer of 2007.  8. Cholecystectomy in the summer of 2007.   9. 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  Followed there annually.   10. 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  Previously received Sculptra.   11. History of a low testosterone level, most recently checked in 1/09 with a normal free testosterone of 14.0 and a total testosterone in the normal range of 482, managed somewhat unorthodoxly by a now-retired Urologist until 7/13.  Since then, has continued taking biweekly home exogenous testosterone IM injections and has tapered them only marginally from ~ weekly to ~ every other week (or a bit less frequent).  Total testosterone level on 5/14/14 was 2926.  12. Syphilis diagnosed 5/08, peak titer of 1:256, completed a treatment course of IM penicillin times 3, last dose in 6/08.  13. Prior rectal warts, treated with Aldara cream.  14. Infected lower left  cracked mandibular molar tooth, repaired 6/10.  15. Unintentional overdose of alcohol, hydrocodone, and clonazepam for which hospitalized in [place] in 10/12.  16. Mild intermittent controlled extensor limb psoriasis.  17. Sleep apnea, diagnosed elsewhere.    ALLERGIES:  PCN and iodine    CURRENT MEDICATIONS:  Current Outpatient Prescriptions   Medication Sig Dispense Refill     testosterone (AXIRON) 30 MG/ACT topical solution Place 2 pumps (60 mg) onto the skin daily 90 mL 3     fenofibrate 54 MG tablet TAKE THREE TABLETS BY MOUTH EVERY DAY 90 tablet 11     levothyroxine (SYNTHROID/LEVOTHROID) 100 MCG tablet TAKE TWO TABLETS BY MOUTH EVERY DAY 60 tablet 11     amLODIPine (NORVASC) 10 MG tablet Take 1 tablet (10 mg) by mouth daily 30 tablet 11     lamiVUDine-zidovudine (COMBIVIR) 150-300 MG per tablet TAKE ONE TABLET BY MOUTH TWICE A DAY 60 tablet 11     nevirapine (VIRAMUNE) 200 MG tablet TAKE ONE TABLET BY MOUTH EVERY 12 HOURS 60 tablet 11     enalapril (VASOTEC) 20 MG tablet TAKE ONE TABLET BY MOUTH TWICE A DAY 60 tablet 11     multivitamin, therapeutic with minerals (MULTI-VITAMIN) TABS Take 1 tablet by mouth daily       aspirin 81 MG tablet Take 1 tablet by mouth daily.       buPROPion (WELLBUTRIN SR) 150 MG 12 hr tablet Take 150 mg by mouth 3 times daily.       LAMOTRIGINE PO Take  by mouth. Take 2 and 1/2 tablets daily       tadalafil (CIALIS) 20 MG tablet Take 1 tablet (20 mg) by mouth daily as needed (Patient not taking: Reported on 10/18/2017) 6 tablet 3     FAMILY HISTORY:  Family History   Problem Relation Age of Onset     Depression Father      Depression Other      Multiple family members.     HEART DISEASE Maternal Grandmother      Myocardial Infarction Maternal Grandfather      Sudden MI / death at age 51.     Obesity Paternal Grandmother      Hyperlipidemia Other      Multiple family members (Parents, brother, sister, PGM)     SOCIAL HISTORY:  Lives alone in basement apartment in [place]  "spends much of his time at his parents home in [facility] in the town he grew up in and where all his siblings still live.  Unemployed, on disability based on his depression diagnosis.  Sexually active, no single steady partner, mostly with other HIV seropositive men, but always discloses his status when with HIV seronegative partners and uses protection.  In the past, has visited friends in [place] for an extended stay.  Tobacco:  Long-standing, up to 2.5 ppd in the past.  Presently 1 ppd (but smokes only half of each cigarette).  Has quit for up to three weeks several times in the past; not interested in further attempts at reduction at present.    REVIEW OF SYSTEMS:  As per HPI.  Complete ROS is otherwise negative.    PHYSICAL EXAMINATION:  General:  A personable, articulate, WDWN 52-year-old man in no discomfort.  Vital Signs:  /87  Pulse 89  Temp 98.3  F (36.8  C) (Oral)  Ht 1.753 m (5' 9\")  Wt 71.2 kg (157 lb)  BMI 23.18 kg/m2 (weight stable).  Skin:  No new rash or lesion.  Head/Neck:  NCAT.  Stable old buccal facial mild lipoatrophy is present.  External auditory canals are patent without discharge.  PERRL. EOMI.  Sclerae are white.  Conjunctivae are pink bilaterally and uninjected.  Oropharynx contains no erythema, exudate or lesion.  Neck is supple without lymphadenopathy or thyromegaly.  Chest:  Bilaterally clear to auscultation.  Chest wall:  There is no present focal chest wall tenderness to palpation.  CV:  Regular rate and rhythm (pulse is 84 during even though the patient suspected he was having a tachycardia), normal S1, S2 without gallop, murmur or rub.  Abdomen:  Bowel sounds normoactive, soft, nontender, no hepatosplenomegaly, old surgical scar.  Back:  No lumbar or CVA tenderness.  :  Normal external male genitalia with descended normal testes.  There is absolutely no tenderness or edema to palpation of scrotum, epididymis, penis, or inguinal region bilaterally.  No " inguinal hernia.  Rectal:  Normal, symmetrical, 2+ mildly enlarged prostate without tenderness or irregularities.  Normal rectal tone without visible or palpated lesions.  Extremities:  Distally warm, no edema.  Neurological:  Alert, oriented, memory / cognition / affect intact.  Gait is normal    LABORATORY STUDIES (Results reviewed with the patient during his appointment today):      Color Urine 10/18/2017 Yellow   Final     Appearance Urine 10/18/2017 Clear   Final     Glucose Urine 10/18/2017 Negative  NEG^Negative mg/dL Final     Bilirubin Urine 10/18/2017 Negative  NEG^Negative Final     Ketones Urine 10/18/2017 Negative  NEG^Negative mg/dL Final     Specific Gravity Urine 10/18/2017 1.009  1.003 - 1.035 Final     Blood Urine 10/18/2017 Negative  NEG^Negative Final     pH Urine 10/18/2017 6.0  5.0 - 7.0 pH Final     Protein Albumin Urine 10/18/2017 Negative  NEG^Negative mg/dL Final     Urobilinogen mg/dL 10/18/2017 0.0  0.0 - 2.0 mg/dL Final     Nitrite Urine 10/18/2017 Negative  NEG^Negative Final     Leukocyte Esterase Urine 10/18/2017 Negative  NEG^Negative Final     Source 10/18/2017 Unspecified Urine   Final     WBC Urine 10/18/2017 2  0 - 2 /HPF Final     RBC Urine 10/18/2017 2  0 - 2 /HPF Final     Mucous Urine 10/18/2017 Present* NEG^Negative /LPF Final   Office Visit on 10/18/2017   Component Date Value Ref Range Status     Interpretation ECG 10/18/2017 Click View Image link to view waveform and result   Final   Orders Only on 10/13/2017   Component Date Value Ref Range Status     Sodium 10/13/2017 138  133 - 144 mmol/L Final     Potassium 10/13/2017 3.9  3.4 - 5.3 mmol/L Final     Chloride 10/13/2017 105  94 - 109 mmol/L Final     Carbon Dioxide 10/13/2017 24  20 - 32 mmol/L Final     Anion Gap 10/13/2017 9  3 - 14 mmol/L Final     Glucose 10/13/2017 100* 70 - 99 mg/dL Final     Urea Nitrogen 10/13/2017 14  7 - 30 mg/dL Final     Creatinine 10/13/2017 1.15  0.66 - 1.25 mg/dL Final     GFR Estimate  10/13/2017 67  >60 mL/min/1.7m2 Final    Non  GFR Calc     GFR Estimate If Black 10/13/2017 81  >60 mL/min/1.7m2 Final    African American GFR Calc     Calcium 10/13/2017 9.0  8.5 - 10.1 mg/dL Final     Bilirubin Total 10/13/2017 0.6  0.2 - 1.3 mg/dL Final     Albumin 10/13/2017 4.4  3.4 - 5.0 g/dL Final     Protein Total 10/13/2017 7.7  6.8 - 8.8 g/dL Final     Alkaline Phosphatase 10/13/2017 70  40 - 150 U/L Final     ALT 10/13/2017 35  0 - 70 U/L Final     AST 10/13/2017 22  0 - 45 U/L Final     WBC 10/13/2017 8.2  4.0 - 11.0 10e9/L Final     RBC Count 10/13/2017 3.33* 4.4 - 5.9 10e12/L Final     Hemoglobin 10/13/2017 12.5* 13.3 - 17.7 g/dL Final     Hematocrit 10/13/2017 35.2* 40.0 - 53.0 % Final     MCV 10/13/2017 106* 78 - 100 fl Final     MCH 10/13/2017 37.5* 26.5 - 33.0 pg Final     MCHC 10/13/2017 35.5  31.5 - 36.5 g/dL Final     RDW 10/13/2017 12.9  10.0 - 15.0 % Final     Platelet Count 10/13/2017 335  150 - 450 10e9/L Final     Diff Method 10/13/2017 Automated Method   Final     % Neutrophils 10/13/2017 54.7  % Final     % Lymphocytes 10/13/2017 34.8  % Final     % Monocytes 10/13/2017 6.1  % Final     % Eosinophils 10/13/2017 3.2  % Final     % Basophils 10/13/2017 1.0  % Final     % Immature Granulocytes 10/13/2017 0.2  % Final     Nucleated RBCs 10/13/2017 0  0 /100 Final     Absolute Neutrophil 10/13/2017 4.5  1.6 - 8.3 10e9/L Final     Absolute Lymphocytes 10/13/2017 2.9  0.8 - 5.3 10e9/L Final     Absolute Monocytes 10/13/2017 0.5  0.0 - 1.3 10e9/L Final     Absolute Eosinophils 10/13/2017 0.3  0.0 - 0.7 10e9/L Final     Absolute Basophils 10/13/2017 0.1  0.0 - 0.2 10e9/L Final     Abs Immature Granulocytes 10/13/2017 0.0  0 - 0.4 10e9/L Final     Absolute Nucleated RBC 10/13/2017 0.0   Final     HIV-1 RNA Quant Result 10/13/2017 HIV-1 RNA Not Detected  HIVND^HIV-1 RNA Not Detected [Copies]/mL Final    Comment: The BEV AmpliPrep/BEV TaqMan HIV-1 test is an FDA-approved in vitro    nucleic acid amplification test for the quantitation of HIV-1 RNA in human   plasma (EDTA plasma) using the BEV AmpliPrep instrument for automated viral   nucleic acid extraction and the BEV TaqMan Analyzer or BEV TaqMan for   automated Real Time PCR amplification and detection of the viral nucleic acid   target.  Titer results are reported in copies/ml. This assay is intended for use in   conjunction with clinical presentation and other laboratory markers of disease   prognosis and for use as an aid in assessing viral response to antiretroviral   treatment as measured by changes in plasma HIV-1 RNA levels. This test should   not be used as a donor screening test to confirm the presence of HIV-1   infection.       HIV RNA QT Log 10/13/2017 Not Calculated  <1.3 [Log_copies]/mL Final     IFC Specimen 10/13/2017 Blood   Final     CD3 Mature T 10/13/2017 89* 49 - 84 % Final     CD4 [place] T 10/13/2017 35  28 - 63 % Final     CD8 Suppressor T 10/13/2017 58* 10 - 40 % Final     CD4:CD8 Ratio 10/13/2017 0.60* 1.40 - 2.60 Final     Absolute CD3 10/13/2017 2452  603 - 2990 cells/uL Final     Absolute CD4 10/13/2017 964  441 - 2156 cells/uL Final     Absolute CD8 10/13/2017 1586* 125 - 1312 cells/uL Final     TSH 10/13/2017 0.75  0.40 - 4.00 mU/L Final     Testosterone Total 10/13/2017 392  240 - 950 ng/dL Final    Comment: This test was developed and its performance characteristics determined by the   [facility],  [facility]. It has   not been cleared or approved by the FDA. The laboratory is regulated under   CLIA as qualified to perform high-complexity testing. This test is used for   clinical purposes. It should not be regarded as investigational or for   research.       Cholesterol 10/13/2017 225* <200 mg/dL Final    Desirable:       <200 mg/dl     Triglycerides 10/13/2017 196* <150 mg/dL Final    Comment: Borderline high:  150-199 mg/dl  High:             200-499  "mg/dl  Very high:       >499 mg/dl       HDL Cholesterol 10/13/2017 61  >39 mg/dL Final     LDL Cholesterol Calculated 10/13/2017 125* <100 mg/dL Final    Comment: Above desirable:  100-129 mg/dl  Borderline High:  130-159 mg/dL  High:             160-189 mg/dL  Very high:       >189 mg/dl       Non HDL Cholesterol 10/13/2017 164* <130 mg/dL Final    Comment: Above Desirable:  130-159 mg/dl  Borderline high:  160-189 mg/dl  High:             190-219 mg/dl  Very high:       >219 mg/dl       Lipase 10/13/2017 148  73 - 393 U/L Final     RPR Titer 10/13/2017 Negative  NEG^Negative [titer] Final     ASSESSMENT/PLAN:    1. Asymptomatic HIV infection:  He continues to have a high, normal absolute CD4+ cell count and an undetectable HIV plasma viral load on antiretroviral therapy with Combivir / nevirapine.  He has tight adherence and good drug tolerance.  He is not interested in switching to an alternative regimen.  He will continue on these medications and return to clinic for follow-up in about six months, or as needed before then.  I pre-ordered his laboratory studies for his next pre-appointment lab draw.  2. Possible palpitations / chest \"fullness\" sensations:  His ability to detect tachycardia and palpitations seems questionable, since he felt he was having a rapid heart beat today while his pulse was clearly normal in the 80s (and subsequently confirmed to be steady and normal rate by EKG).  His chest fullness sensation is infrequent, not exertional, non-radiating, not accompanied by other symptoms that might suggest a coronary origin, and not changed (per his report) in terms of frequency or intensity over the past year or so.  Again, the EKG was normal today.  Given that he is prone to anxiety and has been ruminating on his family heart history, much of this may be related to anxiety.  When told he had a normal pulse and EKG, he seemed relieved and declined a referral to the [facility] for a " more complete assessment.  We decided that he will monitor his symptoms and we can re-consider a Cardiology evaluation if they persist or certainly if they worsen at his next appointment (or sooner if worsening).  I again encouraged daily, gradually-building, aerobic exercise.  We will also check a chest x-ray at his next appointment.  3. Sensation of intermittent left scrotal ache:  Genital / rectal exam today was totally benign without tenderness -- no evidence of epididymitis or any hernia.  He has had no sexual encounters in several years.  Urinalysis was benign today.  Reassured patient today that he lacks any sign of infection -- will monitor.  4. Nocturnal urinary frequency:  He has some palpable prostatic enlargement on digital rectal exam.  We discussed that a certain degree of nocturia is normal in BPH.  We discussed the pros / cons of performing a screening PSA and also the option of trying Flomax.  He declined both today, but would like a PSA at next blood draw next year and will consider Flomax in the future.  He last had a normal PSA (0.50) on 5/14/08.  5. Essential hypertension:  We increased the amlodipine dose to 10 mg PO daily on 5/17/17 (it was started 5/20/15) and he has tolerated that well without any perceived side effects in addition to still taking ongoing enalapril 20 mg PO BID (dose increased on 1/27/15).  He has not checked out-of-office blood pressures but says he will try to record several.  His clinic check-in BP today was improved at 130/87.  I again encouraged aerobic exercise.  (He now has a home exercise bicycle in his basement but does not use it.)  6. Treated hypothyroidism:  He feels well and his TSH was normal on 10/13/17, so he will remain on the same levothyroxine dose of 200 mcg / day (which was lowered in 3/14) and repeat the TSH in one year.  7. Testosterone replacement therapy / previous erectile dysfunction:  His most recent total testosterone level on 10/13/17 was normal  at 392 on Axiron  transcutaneous testosterone topical replacement therapy at two (30 mg) pumps (= 60 mg) daily (since late 1/15).  Will continue that medication.  8. Partially-controlled hyperlipidemia:  His 10/13/17 total cholesterol is stable at 225 with a stable LDL of 125 (improved complared to 1/16/15 for uncertain reasons (although perhaps related to switched psychiatric medications and a lower testosterone replacement dose).  His fasting triglycerides were higher this time at 196 despite ongoing fenofibrate 162 mg daily, but improved versus a decade ago.  He is not on a statin (with past poor medication tolerance) and does not wish to try any statin again.  As above, encouraged regular aerobic exercise.  9. Chronic, intractable depression / anxiety:  By conversation, he seems to me to be in some degree of remission over the past 1.5 years.  Remains, I believe, on armodafinil (Nuvigil) since early 1/14 through Dr. Johnston, as well as bupropion and lamotrigine.  10. Improved previously elevated creatinine:  He has a history of mild chronic kidney insufficiency with past creatinines previously in the 1.2 - 1.5 range since 2005, but the creatinine has been lower at ~ 1.1 over the past two years (likely because he is no longer taking supra-therapeutic testosterone doses).  11. History of prior mild mid-winter vitamin D deficiency:  He self-takes OTC vitamin D supplements most white including this past winter, although whether he needs that is questionable, because a  11/12/15 vitamin D level was adequate at 24.  We previously discussed that he may not really need such supplementation, but he does not take enough to be harmful.  12. Mild psoriasis:  Well-controlled with topical Clobex prn.  Not discussed today.  13. Health Care Maintenance:  Influenza vaccine given today.  Menactra vaccine given 11/18/15.  Tdap was boosted 1/21/15.  Prevnar 13 given 10/16/13; Pneumovax given 1/21/15.  HAV / HBV immunized /  seroimmune.  HCV negative 6/1/09.  Negative rectal Pap done by Dr. Sanchez 6/28/10.  Repeat RPR was negative on 11/12/15; will repeat with next blood draw (had a positive antitreponemal antibody on 8/27/09).  Quantiferon Gold was negative 11/12/15.  Sees a dentist > annually.  Sees a sleep apnea specialist for Cpap.  Saw an ophthalmologist in early 2012.  Now newly due for a routine screening colonoscopy, but he declined today -- will pursue at next appointment again.

## 2017-12-29 DIAGNOSIS — E34.9 HYPOTESTOSTERONISM: ICD-10-CM

## 2017-12-29 RX ORDER — TESTOSTERONE 30 MG/1.5ML
2 SOLUTION TOPICAL DAILY
Qty: 90 ML | Refills: 5 | Status: SHIPPED | OUTPATIENT
Start: 2017-12-29 | End: 2018-08-02

## 2018-01-26 ENCOUNTER — TELEPHONE (OUTPATIENT)
Dept: PHARMACY | Facility: CLINIC | Age: 53
End: 2018-01-26

## 2018-01-26 DIAGNOSIS — I10 HTN (HYPERTENSION): ICD-10-CM

## 2018-01-26 RX ORDER — ENALAPRIL MALEATE 20 MG/1
TABLET ORAL
Qty: 60 TABLET | Refills: 8 | Status: SHIPPED | OUTPATIENT
Start: 2018-01-26 | End: 2018-11-01

## 2018-01-26 NOTE — TELEPHONE ENCOUNTER
Attempted to contact the patient to for refill reminder call,  left message on voicemail.    Follow Up: 01/29/18    Brunilda Norton [facility]  [phone]

## 2018-01-26 NOTE — TELEPHONE ENCOUNTER
Pt called back for refills.   [facility] address has been verified.     Follow Up: 02/27/18    Brunilda Norton, [facility]  [phone]

## 2018-02-08 DIAGNOSIS — I10 ESSENTIAL HYPERTENSION: ICD-10-CM

## 2018-02-08 DIAGNOSIS — Z21 ASYMPTOMATIC HUMAN IMMUNODEFICIENCY VIRUS (HIV) INFECTION STATUS (H): ICD-10-CM

## 2018-02-08 DIAGNOSIS — E78.1 HYPERTRIGLYCERIDEMIA: ICD-10-CM

## 2018-02-08 RX ORDER — FENOFIBRATE 54 MG/1
TABLET ORAL
Qty: 90 TABLET | Refills: 2 | Status: SHIPPED | OUTPATIENT
Start: 2018-02-08 | End: 2018-05-07

## 2018-02-08 RX ORDER — LAMIVUDINE AND ZIDOVUDINE 150; 300 MG/1; MG/1
1 TABLET, FILM COATED ORAL 2 TIMES DAILY
Qty: 60 TABLET | Refills: 8 | Status: SHIPPED | OUTPATIENT
Start: 2018-02-08 | End: 2018-11-01

## 2018-02-08 RX ORDER — AMLODIPINE BESYLATE 10 MG/1
10 TABLET ORAL DAILY
Qty: 30 TABLET | Refills: 8 | Status: SHIPPED | OUTPATIENT
Start: 2018-02-08 | End: 2018-11-01

## 2018-02-08 RX ORDER — NEVIRAPINE 200 MG/1
TABLET ORAL
Qty: 60 TABLET | Refills: 8 | Status: SHIPPED | OUTPATIENT
Start: 2018-02-08 | End: 2018-11-01

## 2018-02-27 ENCOUNTER — TELEPHONE (OUTPATIENT)
Dept: PHARMACY | Facility: CLINIC | Age: 53
End: 2018-02-27

## 2018-02-27 NOTE — TELEPHONE ENCOUNTER
Attempted to contact the patient to for refill reminder call,  left message on voicemail.    Follow up :03/01/18    Brunilda Norton [facility]  [phone]

## 2018-03-01 ENCOUNTER — TELEPHONE (OUTPATIENT)
Dept: PHARMACY | Facility: CLINIC | Age: 53
End: 2018-03-01

## 2018-03-30 ENCOUNTER — TELEPHONE (OUTPATIENT)
Dept: PHARMACY | Facility: CLINIC | Age: 53
End: 2018-03-30

## 2018-03-30 NOTE — TELEPHONE ENCOUNTER
Called patient for refill reminder.    [facility] address has been verified.     Follow up: 04/30/18    Brunilda Norton, [facility]  [phone]

## 2018-04-13 DIAGNOSIS — Z21 HUMAN IMMUNODEFICIENCY VIRUS I INFECTION (H): ICD-10-CM

## 2018-04-13 DIAGNOSIS — N40.1 BENIGN PROSTATIC HYPERPLASIA WITH NOCTURIA: ICD-10-CM

## 2018-04-13 DIAGNOSIS — R35.1 BENIGN PROSTATIC HYPERPLASIA WITH NOCTURIA: ICD-10-CM

## 2018-04-13 LAB
ALBUMIN SERPL-MCNC: 4.5 G/DL (ref 3.4–5)
ALP SERPL-CCNC: 67 U/L (ref 40–150)
ALT SERPL W P-5'-P-CCNC: 20 U/L (ref 0–70)
ANION GAP SERPL CALCULATED.3IONS-SCNC: 8 MMOL/L (ref 3–14)
AST SERPL W P-5'-P-CCNC: 13 U/L (ref 0–45)
BASOPHILS # BLD AUTO: 0.1 10E9/L (ref 0–0.2)
BASOPHILS NFR BLD AUTO: 0.9 %
BILIRUB SERPL-MCNC: 0.5 MG/DL (ref 0.2–1.3)
BUN SERPL-MCNC: 13 MG/DL (ref 7–30)
CALCIUM SERPL-MCNC: 9 MG/DL (ref 8.5–10.1)
CHLORIDE SERPL-SCNC: 105 MMOL/L (ref 94–109)
CO2 SERPL-SCNC: 22 MMOL/L (ref 20–32)
CREAT SERPL-MCNC: 1.05 MG/DL (ref 0.66–1.25)
DIFFERENTIAL METHOD BLD: ABNORMAL
EOSINOPHIL # BLD AUTO: 0 10E9/L (ref 0–0.7)
EOSINOPHIL NFR BLD AUTO: 0 %
ERYTHROCYTE [DISTWIDTH] IN BLOOD BY AUTOMATED COUNT: 12.9 % (ref 10–15)
GFR SERPL CREATININE-BSD FRML MDRD: 75 ML/MIN/1.7M2
GLUCOSE SERPL-MCNC: 94 MG/DL (ref 70–99)
HCT VFR BLD AUTO: 39.9 % (ref 40–53)
HGB BLD-MCNC: 14.4 G/DL (ref 13.3–17.7)
IMM GRANULOCYTES # BLD: 0 10E9/L (ref 0–0.4)
IMM GRANULOCYTES NFR BLD: 0.4 %
LIPASE SERPL-CCNC: 154 U/L (ref 73–393)
LYMPHOCYTES # BLD AUTO: 3.5 10E9/L (ref 0.8–5.3)
LYMPHOCYTES NFR BLD AUTO: 43.1 %
MCH RBC QN AUTO: 37.8 PG (ref 26.5–33)
MCHC RBC AUTO-ENTMCNC: 36.1 G/DL (ref 31.5–36.5)
MCV RBC AUTO: 105 FL (ref 78–100)
MONOCYTES # BLD AUTO: 0.5 10E9/L (ref 0–1.3)
MONOCYTES NFR BLD AUTO: 6.4 %
NEUTROPHILS # BLD AUTO: 4 10E9/L (ref 1.6–8.3)
NEUTROPHILS NFR BLD AUTO: 49.2 %
NRBC # BLD AUTO: 0 10*3/UL
NRBC BLD AUTO-RTO: 0 /100
PLATELET # BLD AUTO: 350 10E9/L (ref 150–450)
POTASSIUM SERPL-SCNC: 3.8 MMOL/L (ref 3.4–5.3)
PROT SERPL-MCNC: 7.9 G/DL (ref 6.8–8.8)
PSA SERPL-ACNC: 1.06 UG/L (ref 0–4)
RBC # BLD AUTO: 3.81 10E12/L (ref 4.4–5.9)
SODIUM SERPL-SCNC: 135 MMOL/L (ref 133–144)
WBC # BLD AUTO: 8.1 10E9/L (ref 4–11)

## 2018-04-14 LAB
CD3 CELLS # BLD: 3403 CELLS/UL (ref 603–2990)
CD3 CELLS NFR BLD: 90 % (ref 49–84)
CD3+CD4+ CELLS # BLD: 1320 CELLS/UL (ref 441–2156)
CD3+CD4+ CELLS NFR BLD: 35 % (ref 28–63)
CD3+CD4+ CELLS/CD3+CD8+ CLL BLD: 0.61 % (ref 1.4–2.6)
CD3+CD8+ CELLS # BLD: 2146 CELLS/UL (ref 125–1312)
CD3+CD8+ CELLS NFR BLD: 57 % (ref 10–40)
IFC SPECIMEN: ABNORMAL

## 2018-04-18 ENCOUNTER — OFFICE VISIT (OUTPATIENT)
Dept: INFECTIOUS DISEASES | Facility: CLINIC | Age: 53
End: 2018-04-18
Attending: INTERNAL MEDICINE
Payer: MEDICARE

## 2018-04-18 VITALS
BODY MASS INDEX: 23 KG/M2 | SYSTOLIC BLOOD PRESSURE: 136 MMHG | HEART RATE: 88 BPM | OXYGEN SATURATION: 98 % | WEIGHT: 155.3 LBS | HEIGHT: 69 IN | DIASTOLIC BLOOD PRESSURE: 87 MMHG

## 2018-04-18 DIAGNOSIS — E03.9 ACQUIRED HYPOTHYROIDISM: ICD-10-CM

## 2018-04-18 DIAGNOSIS — E29.1 HYPOTESTOSTERONEMIA IN MALE: ICD-10-CM

## 2018-04-18 DIAGNOSIS — E78.00 HYPERCHOLESTEROLEMIA: ICD-10-CM

## 2018-04-18 DIAGNOSIS — I10 ESSENTIAL HYPERTENSION: ICD-10-CM

## 2018-04-18 DIAGNOSIS — E55.9 VITAMIN D DEFICIENCY: ICD-10-CM

## 2018-04-18 DIAGNOSIS — Z21 HUMAN IMMUNODEFICIENCY VIRUS I INFECTION (H): Primary | ICD-10-CM

## 2018-04-18 PROCEDURE — G0463 HOSPITAL OUTPT CLINIC VISIT: HCPCS | Mod: ZF

## 2018-04-18 RX ORDER — PRAVASTATIN SODIUM 10 MG
10 TABLET ORAL DAILY
Qty: 30 TABLET | Refills: 5 | Status: SHIPPED | OUTPATIENT
Start: 2018-04-18 | End: 2018-11-01

## 2018-04-18 ASSESSMENT — PAIN SCALES - GENERAL: PAINLEVEL: NO PAIN (0)

## 2018-04-18 NOTE — NURSING NOTE
"Chief Complaint   Patient presents with     RECHECK     B20       Initial /87  Pulse 88  Ht 1.753 m (5' 9\")  Wt 70.4 kg (155 lb 4.8 oz)  SpO2 98%  BMI 22.93 kg/m2 Estimated body mass index is 22.93 kg/(m^2) as calculated from the following:    Height as of this encounter: 1.753 m (5' 9\").    Weight as of this encounter: 70.4 kg (155 lb 4.8 oz).  Medication Reconciliation: broderick Jorgensen CMA      Depression Response    Patient completed the PHQ-9 assessment for depression and scored >9? Yes  Question 9 on the PHQ-9 was positive for suicidality? No  Is the patient already receiving treatment for depression? Yes  Patient would like to speak with behavioral health team ([facility] clinics only)? No    I personally notified the following: visit provider    Behavioral Health/Social Work Contact Information     [facility]  Adonis Jon MA, LMFT  Lead Behavioral Health Clinician  Phone: [phone]  [facility] Pager: [phone]    Non-[facility]  [facility] On-Call   Pager: [phone]       "

## 2018-04-18 NOTE — MR AVS SNAPSHOT
After Visit Summary   4/18/2018    Dennys Bee    MRN: [id number]           Patient Information     Date Of Birth          1965        Visit Information        Provider Department      4/18/2018 6:00 PM Rc Latham MD [facility] Infect[facility]        Today's Diagnoses     Hypercholesterolemia    -  1    Human immunodeficiency virus I infection (H)           Follow-ups after your visit        Follow-up notes from your care team     Return in about 6 months (around 10/18/2018).      Your next 10 appointments already scheduled     Oct 12, 2018  1:00 PM CDT   LAB with  LAB   [facility] ([facility])    [address] [place] [address] Se  1st Floor  [facility] [address] [phone]           Please do not eat 10-12 hours before your appointment if you are coming in fasting for labs on lipids, cholesterol, or glucose (sugar). This does not apply to pregnant women. Water, hot tea and black coffee (with nothing added) are okay. Do not drink other fluids, diet soda or chew gum.            Oct 17, 2018  6:00 PM CDT   (Arrive by 5:45 PM)   Return Visit with Rc Latham MD   [facility] ([facility])    [address] [place] [address]  [address]  [facility] [address] [phone]              Who to contact     If you have questions or need follow up information about today's clinic visit or your schedule please contact [facility] directly at [phone].  Normal or non-critical lab and imaging results will be communicated to you by MyChart, letter or phone within 4 business days after the clinic has received the results. If you do not hear from us within 7 days, please contact the clinic through [employer] or phone. If you have a critical or abnormal lab result, we will notify you by phone as soon as possible.  Submit refill requests through [employer]  "or call your pharmacy and they will forward the refill request to us. Please allow 3 business days for your refill to be completed.          Additional Information About Your Visit        [employer] Information     [employer] gives you secure access to your electronic health record. If you see a primary care provider, you can also send messages to your care team and make appointments. If you have questions, please call your primary care clinic.  If you do not have a primary care provider, please call [phone] and they will assist you.        Care EveryWhere ID     This is your Care EveryWhere ID. This could be used by other organizations to access your [facility] medical records  [id number]        Your Vitals Were     Pulse Height Pulse Oximetry BMI (Body Mass Index)          88 1.753 m (5' 9\") 98% 22.93 kg/m2         Blood Pressure from Last 3 Encounters:   04/18/18 136/87   10/18/17 130/87   05/17/17 (!) 145/91    Weight from Last 3 Encounters:   04/18/18 70.4 kg (155 lb 4.8 oz)   10/18/17 71.2 kg (157 lb)   05/17/17 68.8 kg (151 lb 9.6 oz)              Today, you had the following     No orders found for display         Today's Medication Changes          These changes are accurate as of 4/18/18  6:58 PM.  If you have any questions, ask your nurse or doctor.               Start taking these medicines.        Dose/Directions    pravastatin 10 MG tablet   Commonly known as:  PRAVACHOL   Used for:  Hypercholesterolemia   Started by:  Rc Latham MD        Dose:  10 mg   Take 1 tablet (10 mg) by mouth daily   Quantity:  30 tablet   Refills:  5            Where to get your medicines      These medications were sent to [facility] [employer], [place] - [address]  [address] [address]    Hours:  TRANSPLANT PHONE NUMBER [phone] Phone:  [phone]     pravastatin 10 MG tablet                Primary Care Provider Office Phone # Fax #    Rc " ALDO Latham [phone]       [address] [place] [address]        Equal Access to Services     POLLO MILTON : Vladimir Sumner, shamir james, elton lockmadavid adkinsantdavid, waxchelle kelsiein hayaacassia fontenotsarina root roly guerrero. So [facility] [phone].    ATENCIÓN: Si habla español, tiene a solomon disposición servicios gratuitos de asistencia lingüística. Llame al [phone].    We comply with applicable federal civil rights laws and [place] laws. We do not discriminate on the basis of race, color, national origin, age, disability, sex, sexual orientation, or gender identity.            Thank you!     Thank you for choosing [facility]  for your care. Our goal is always to provide you with excellent care. Hearing back from our patients is one way we can continue to improve our services. Please take a few minutes to complete the written survey that you may receive in the mail after your visit with us. Thank you!             Your Updated Medication List - Protect others around you: Learn how to safely use, store and throw away your medicines at www.disposemymeds.org.          This list is accurate as of 4/18/18  6:58 PM.  Always use your most recent med list.                   Brand Name Dispense Instructions for use Diagnosis    amLODIPine 10 MG tablet    NORVASC    30 tablet    Take 1 tablet (10 mg) by mouth daily    Essential hypertension       aspirin 81 MG tablet      Take 1 tablet by mouth daily.        buPROPion 150 MG 12 hr tablet    WELLBUTRIN SR     Take 150 mg by mouth 3 times daily.        enalapril 20 MG tablet    VASOTEC    60 tablet    TAKE ONE TABLET BY MOUTH TWICE A DAY    HTN (hypertension)       fenofibrate 54 MG tablet     90 tablet    TAKE THREE TABLETS BY MOUTH EVERY DAY    Hypertriglyceridemia       lamiVUDine-zidovudine 150-300 MG per tablet    COMBIVIR    60 tablet    Take 1 tablet by mouth 2 times daily    Asymptomatic human  immunodeficiency virus (HIV) infection status (H)       LAMOTRIGINE PO      Take  by mouth. Take 2 and 1/2 tablets daily        levothyroxine 100 MCG tablet    SYNTHROID/LEVOTHROID    60 tablet    TAKE TWO TABLETS BY MOUTH EVERY DAY    Hypertriglyceridemia       Multi-vitamin Tabs tablet      Take 1 tablet by mouth daily        nevirapine 200 MG tablet    VIRAMUNE    60 tablet    TAKE ONE TABLET BY MOUTH EVERY 12 HOURS    Asymptomatic human immunodeficiency virus (HIV) infection status (H)       pravastatin 10 MG tablet    PRAVACHOL    30 tablet    Take 1 tablet (10 mg) by mouth daily    Hypercholesterolemia       tadalafil 20 MG tablet    CIALIS    6 tablet    Take 1 tablet (20 mg) by mouth daily as needed    Erectile dysfunction       testosterone 30 MG/ACT topical solution    AXIRON    90 mL    Place 2 pumps (60 mg) onto the skin daily    Hypotestosteronism

## 2018-04-19 ASSESSMENT — PATIENT HEALTH QUESTIONNAIRE - PHQ9: SUM OF ALL RESPONSES TO PHQ QUESTIONS 1-9: 18

## 2018-04-30 ENCOUNTER — TELEPHONE (OUTPATIENT)
Dept: PHARMACY | Facility: CLINIC | Age: 53
End: 2018-04-30

## 2018-04-30 NOTE — TELEPHONE ENCOUNTER
Pt called to order refills.  [facility] address has been verified.     Follow-up Date: 05/29/18    Brunilda Norton, [facility]  [phone]

## 2018-05-07 DIAGNOSIS — E78.1 HYPERTRIGLYCERIDEMIA: ICD-10-CM

## 2018-05-07 RX ORDER — FENOFIBRATE 54 MG/1
TABLET ORAL
Qty: 90 TABLET | Refills: 5 | Status: SHIPPED | OUTPATIENT
Start: 2018-05-07 | End: 2018-11-01

## 2018-05-07 NOTE — PROGRESS NOTES
"Division of Infectious Diseases and International Medicine  Department of Medicine        [facility] OUTPATIENT VISIT NOTE [place] [address] [place] [address]  Office: [phone]  Fax:  [phone]     HISTORY OF PRESENT ILLNESS:  Gadiel Bee is a 52 year old gentleman with asymptomatic HIV infection who today returns to clinic to follow-up continued antiretroviral therapy with Combivir one tablet PO BID and nevirapine 200 mg PO BID (started 5/03). He has not missed any doses since his most recent past appointment here on 10/18/17.  He lacks drug adverse effects.  He reports that the prolonged winter has been psychologically taxing but he is looking forward to spring that is now starting to break through.  He generally feels overall well without new complaints today.  He had pre-appointment lab studies drawn on 4/13/18 and we reviewed today that those looked good, including a low PSA of 1.06.  He remains on fenofibrate 162 mg PO daily for hypertriglyceridemia but has not been on a statin for a long time due to some past myalgias on atorvastatin -- after reviewing again his somewhat elevated LDL cholesterol (125) drawn 10/25/17, however, in light of his strong family history of heart disease (and, he now also believes, hypercholesterolemia), he is willing to now try a different statin at a low-dose, however.  His prior pleuritic left chest \"fullness / tightness\" reported on 10/18/17 resolved and has not been noticed for quite some time.  He has also not noticed any tachycardia recently.   His blood pressure (by infrequent home checks) is controlled on amlodipine 10 mg daily (increased 5/17/17, started 5/20/15) plus lisinopril (20 mg PO BID since 1/15).  His prior left scrotum ache was not complained of today.  His nocturnal frequency (several times some nights) is unchanged.  His chronic depression / anxiety / insomnia / agoraphobia is \"status quo\" of late (which is " good considering the long persistent winter) and is managed by his psychiatrist in [place], Dr. Johnston.  He continues to take Synthroid 200 mcg / day (since 3/14) for chronic hypothyroidism and use topical testosterone, Axiron, two 30 mg pumps for hypotestosteronism / erectile dysfunction, without side effects or current concerns.  He lacks other new complaints today, including no fever, chills, night sweats, anorexia, increased fatigue, EENT symptoms, cough, resting or exertional dyspnea, helen chest pain, nausea, abdominal pain, diarrhea, rash, myalgias / arthralgias, dysuria, daytime urgency, hematuria, prostatic ache, or genital discharge, other genitourinary symptoms, headache, or other new psychiatric or neurological symptoms.    PAST MEDICAL HISTORY:    1. HIV, diagnosed 7/93, previously treated with AZT, 3TC and Crixivan. He was then on AZT plus efavirenz, but in 5/03 began Combivir/Sustiva and has remained virologically suppressed on this regimen since.  2. Stable chronic stage 1 kidney disease:  Nephrology Clinic evaluation (including negative MRI scan) unremarkable in 2009.  3. Hyperlipidemia previously requiring multiple medications, more hypertriglyceridemia than hypercholesterolemia -- now only on fenofibrate.   4. Hyperthyroidism on levothyroxine.   5. History of difficult-to-treat anxiety and depression -- followed for a number of years by psychiatrist Dr. Abebe Johnston in [place].   6. Hypertension treated with Vasotec.   7. Acute appendicitis with rupture in the summer of 2007.  8. Cholecystectomy in the summer of 2007.   9. 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  Followed there annually.   10. 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  Previously received Sculptra.   11. History of a low testosterone level, most  recently checked in 1/09 with a normal free testosterone of 14.0 and a total testosterone in the normal range of 482, managed somewhat unorthodoxly by a now-retired Urologist until 7/13.  Since then, has continued taking biweekly home exogenous testosterone IM injections and has tapered them only marginally from ~ weekly to ~ every other week (or a bit less frequent).  Total testosterone level on 5/14/14 was 2926.  12. Syphilis diagnosed 5/08, peak titer of 1:256, completed a treatment course of IM penicillin times 3, last dose in 6/08.  13. Prior rectal warts, treated with Aldara cream.  14. Infected lower left cracked mandibular molar tooth, repaired 6/10.  15. Unintentional overdose of alcohol, hydrocodone, and clonazepam for which hospitalized in [place] in 10/12.  16. Mild intermittent controlled extensor limb psoriasis.  17. Sleep apnea, diagnosed elsewhere.    ALLERGIES:  PCN and iodine    CURRENT MEDICATIONS:  Current Outpatient Prescriptions   Medication Sig Dispense Refill     amLODIPine (NORVASC) 10 MG tablet Take 1 tablet (10 mg) by mouth daily 30 tablet 8     aspirin 81 MG tablet Take 1 tablet by mouth daily.       buPROPion (WELLBUTRIN SR) 150 MG 12 hr tablet Take 150 mg by mouth 3 times daily.       enalapril (VASOTEC) 20 MG tablet TAKE ONE TABLET BY MOUTH TWICE A DAY 60 tablet 8     fenofibrate 54 MG tablet TAKE THREE TABLETS BY MOUTH EVERY DAY 90 tablet 2     lamiVUDine-zidovudine (COMBIVIR) 150-300 MG per tablet Take 1 tablet by mouth 2 times daily 60 tablet 8     LAMOTRIGINE PO Take  by mouth. Take 2 and 1/2 tablets daily       levothyroxine (SYNTHROID/LEVOTHROID) 100 MCG tablet TAKE TWO TABLETS BY MOUTH EVERY DAY 60 tablet 11     multivitamin, therapeutic with minerals (MULTI-VITAMIN) TABS Take 1 tablet by mouth daily       nevirapine (VIRAMUNE) 200 MG tablet TAKE ONE TABLET BY MOUTH EVERY 12 HOURS 60 tablet 8     pravastatin (PRAVACHOL) 10 MG tablet Take 1 tablet (10 mg) by mouth daily 30 tablet  "5     tadalafil (CIALIS) 20 MG tablet Take 1 tablet (20 mg) by mouth daily as needed 6 tablet 3     testosterone (AXIRON) 30 MG/ACT topical solution Place 2 pumps (60 mg) onto the skin daily 90 mL 5     FAMILY HISTORY:  Family History   Problem Relation Age of Onset     Depression Father      Depression Other      Multiple family members.     HEART DISEASE Maternal Grandmother      Myocardial Infarction Maternal Grandfather      Sudden MI / death at age 51.     Obesity Paternal Grandmother      Hyperlipidemia Other      Multiple family members (Parents, brother, sister, PGM)     SOCIAL HISTORY:  Lives alone in basement apartment in [place] spends much of his time at his parents home in [place] in the town he grew up in and where all his siblings still live.  Unemployed, on disability based on his depression diagnosis.  Sexually active, no single steady partner, mostly with other HIV seropositive men, but always discloses his status when with HIV seronegative partners and uses protection.  In the past, has visited friends in [place] for an extended stay.  Tobacco:  Long-standing, up to 2.5 ppd in the past.  Presently 1 ppd (but smokes only half of each cigarette).  Has quit for up to three weeks several times in the past; not interested in further attempts at reduction at present.    REVIEW OF SYSTEMS:  As per HPI.  Complete ROS is otherwise negative.    PHYSICAL EXAMINATION:  General:  A pleasant, conversing, WDWN 52-year-old man in no discomfort.  Vital Signs:  /87  Pulse 88  Ht 1.753 m (5' 9\")  Wt 70.4 kg (155 lb 4.8 oz)  SpO2 98%  BMI 22.93 kg/m2 (weight stable).  Skin:  No rash or acute lesion.  Head/Neck:  NCAT.  Unchanged old buccal facial mild lipoatrophy.  External auditory canals are patent bilaterally without discharge.  PERRL. EOMI.  Sclerae are anicteric.  Conjunctivae are uninjected.  Oropharynx shows no erythema, exudate or lesion.  Dentition is in adequate repair.  " Neck is supple without lymphadenopathy or thyromegaly.  Lungs:  Bilaterally clear to auscultation.  CV:  Regular rate and rhythm, normal S1, S2 without gallop, murmur or rub.  Abdomen:  Bowel sounds normal, soft, nontender, no hepatomegaly, old surgical scar present.  Back:  No lower back or CVA tenderness.  :  Not examined.  Rectal:  Not examined (2+ mildly enlarged prostate on previous exam at last appointment).  Extremities:  Distally warm, no edema.  Neurological:  Alert, oriented, memory / cognition / affect intact.  Gait is normal.    LABORATORY STUDIES (Results reviewed with the patient during his appointment today):    Orders Only on 04/13/2018   Component Date Value Ref Range Status     Sodium 04/13/2018 135  133 - 144 mmol/L Final     Potassium 04/13/2018 3.8  3.4 - 5.3 mmol/L Final     Chloride 04/13/2018 105  94 - 109 mmol/L Final     Carbon Dioxide 04/13/2018 22  20 - 32 mmol/L Final     Anion Gap 04/13/2018 8  3 - 14 mmol/L Final     Glucose 04/13/2018 94  70 - 99 mg/dL Final     Urea Nitrogen 04/13/2018 13  7 - 30 mg/dL Final     Creatinine 04/13/2018 1.05  0.66 - 1.25 mg/dL Final     GFR Estimate 04/13/2018 75  >60 mL/min/1.7m2 Final    Non  GFR Calc     GFR Estimate If Black 04/13/2018 >90  >60 mL/min/1.7m2 Final    African American GFR Calc     Calcium 04/13/2018 9.0  8.5 - 10.1 mg/dL Final     Bilirubin Total 04/13/2018 0.5  0.2 - 1.3 mg/dL Final     Albumin 04/13/2018 4.5  3.4 - 5.0 g/dL Final     Protein Total 04/13/2018 7.9  6.8 - 8.8 g/dL Final     Alkaline Phosphatase 04/13/2018 67  40 - 150 U/L Final     ALT 04/13/2018 20  0 - 70 U/L Final     AST 04/13/2018 13  0 - 45 U/L Final     WBC 04/13/2018 8.1  4.0 - 11.0 10e9/L Final     RBC Count 04/13/2018 3.81* 4.4 - 5.9 10e12/L Final     Hemoglobin 04/13/2018 14.4  13.3 - 17.7 g/dL Final     Hematocrit 04/13/2018 39.9* 40.0 - 53.0 % Final     MCV 04/13/2018 105* 78 - 100 fl Final     MCH 04/13/2018 37.8* 26.5 - 33.0 pg Final      MCHC 04/13/2018 36.1  31.5 - 36.5 g/dL Final     RDW 04/13/2018 12.9  10.0 - 15.0 % Final     Platelet Count 04/13/2018 350  150 - 450 10e9/L Final     Diff Method 04/13/2018 Automated Method   Final     % Neutrophils 04/13/2018 49.2  % Final     % Lymphocytes 04/13/2018 43.1  % Final     % Monocytes 04/13/2018 6.4  % Final     % Eosinophils 04/13/2018 0.0  % Final     % Basophils 04/13/2018 0.9  % Final     % Immature Granulocytes 04/13/2018 0.4  % Final     Nucleated RBCs 04/13/2018 0  0 /100 Final     Absolute Neutrophil 04/13/2018 4.0  1.6 - 8.3 10e9/L Final     Absolute Lymphocytes 04/13/2018 3.5  0.8 - 5.3 10e9/L Final     Absolute Monocytes 04/13/2018 0.5  0.0 - 1.3 10e9/L Final     Absolute Eosinophils 04/13/2018 0.0  0.0 - 0.7 10e9/L Final     Absolute Basophils 04/13/2018 0.1  0.0 - 0.2 10e9/L Final     Abs Immature Granulocytes 04/13/2018 0.0  0 - 0.4 10e9/L Final     Absolute Nucleated RBC 04/13/2018 0.0   Final     HIV-1 RNA Quant Result 04/13/2018 HIV-1 RNA Not Detected  HIVND^HIV-1 RNA Not Detected [Copies]/mL Final    Comment: The BEV AmpliPrep/BEV TaqMan HIV-1 test is an FDA-approved in vitro   nucleic acid amplification test for the quantitation of HIV-1 RNA in human   plasma (EDTA plasma) using the BEV AmpliPrep instrument for automated viral   nucleic acid extraction and the BEV TaqMan Analyzer or BEV TaqMan for   automated Real Time PCR amplification and detection of the viral nucleic acid   target.  Titer results are reported in copies/ml. This assay is intended for use in   conjunction with clinical presentation and other laboratory markers of disease   prognosis and for use as an aid in assessing viral response to antiretroviral   treatment as measured by changes in plasma HIV-1 RNA levels. This test should   not be used as a donor screening test to confirm the presence of HIV-1   infection.       HIV RNA QT Log 04/13/2018 Not Calculated  <1.3 [Log_copies]/mL Final     IFC  Specimen 04/13/2018 Blood   Final     CD3 Mature T 04/13/2018 90* 49 - 84 % Final     CD4 [place] T 04/13/2018 35  28 - 63 % Final     CD8 Suppressor T 04/13/2018 57* 10 - 40 % Final     CD4:CD8 Ratio 04/13/2018 0.61* 1.40 - 2.60 Final     Absolute CD3 04/13/2018 3403* 603 - 2990 cells/uL Final     Absolute CD4 04/13/2018 1320  441 - 2156 cells/uL Final     Absolute CD8 04/13/2018 2146* 125 - 1312 cells/uL Final     Lipase 04/13/2018 154  73 - 393 U/L Final     PSA 04/13/2018 1.06  0 - 4 ug/L Final    Assay Method:  Chemiluminescence using Siemens Vista analyzer       TSH 10/13/2017 0.75  0.40 - 4.00 mU/L Final     Testosterone Total 10/13/2017 392  240 - 950 ng/dL Final    Comment: This test was developed and its performance characteristics determined by the   [facility],  Special [facility] Laboratory. It has   not been cleared or approved by the FDA. The laboratory is regulated under   CLIA as qualified to perform high-complexity testing. This test is used for   clinical purposes. It should not be regarded as investigational or for   research.       Cholesterol 10/13/2017 225* <200 mg/dL Final    Desirable:       <200 mg/dl     Triglycerides 10/13/2017 196* <150 mg/dL Final    Comment: Borderline high:  150-199 mg/dl  High:             200-499 mg/dl  Very high:       >499 mg/dl       HDL Cholesterol 10/13/2017 61  >39 mg/dL Final     LDL Cholesterol Calculated 10/13/2017 125* <100 mg/dL Final    Comment: Above desirable:  100-129 mg/dl  Borderline High:  130-159 mg/dL  High:             160-189 mg/dL  Very high:       >189 mg/dl       Non HDL Cholesterol 10/13/2017 164* <130 mg/dL Final    Comment: Above Desirable:  130-159 mg/dl  Borderline high:  160-189 mg/dl  High:             190-219 mg/dl  Very high:       >219 mg/dl       Lipase 10/13/2017 148  73 - 393 U/L Final     RPR Titer 10/13/2017 Negative  NEG^Negative [titer] Final     ASSESSMENT/PLAN:    1. Stable HIV infection:    Rohit takes and tolerates his present long-standing Combivir / nevirapine antiretroviral therapy without difficulty and does not wish to change therapy -- he continues to have a high, normal absolute CD4+ cell count and an undetectable HIV plasma viral load.  He will remain on these drugs and return to this clinic for follow-up in about six months, or as needed before then.  I have pre-ordered his laboratory studies for his next pre-appointment lab draw.  2. Only partially-controlled hyperlipidemia:  His 10/13/17 total cholesterol was stable at 225 with a stable LDL of 125 -- improved complared to 1/16/15 for uncertain reasons (although perhaps related to switched psychiatric medications and a lower testosterone replacement dose), but his fasting triglycerides were higher at 196 (despite ongoing fenofibrate 162 mg daily).  He had past poor tolerance of atorvastatin, but with ongoing elevated cholesterol and triglyceride levels and a family history of hyperlipidemia and coronary disease, he is now willing to try statin therapy once again.  We will try a lowest available dose of pravastatin 10 mg PO daily.  He is encouraged to phone if he experiences any side effects at all.  We will repeat a fasting lipid profile (and a CL) at his next blood draw.  3. BPH / nocturnal urinary frequency:  He had palpable prostatic enlargement on 10/18/17 digital rectal exam with a normal PSA screen.  His nocturia is not rapidly worsening.  He is not interested in trying Flomax at this time.  4. Controlled essential hypertension:  He takes and tolerates amlodipine 10 mg PO daily (increased 5/17/17, started 5/20/15) and enalapril 20 mg PO BID (dose increased 1/27/15) well and seems to have acceptable blood pressures now.  I encouraged him to continue to check occasional blood pressures out-of-office blood pressures (at home).  I also again encouraged aerobic exercise (with the return of spring).  5. Treated hypothyroidism:  He feels  well and his TSH was normal on 10/13/17, so he will remain on the same levothyroxine dose of 200 mcg / day (which was lowered in 3/14) and repeat the TSH at next blood draw.  6. Testosterone replacement therapy / previous erectile dysfunction:  His most recent total testosterone level on 10/13/17 was normal at 392 on Axiron  transcutaneous testosterone topical replacement therapy at two (30 mg) pumps (= 60 mg) daily (since late 1/15).  Will continue that medication and rechecks the testosterone level at his next blood draw.  7. Chronic, intractable depression / anxiety / insomnia / agoraphobia:  By causal conversation again today, he seems to be in a measure of remission over the past two years, on, I believe, ongoing armodafinil (Nuvigil) since early 1/14 through Dr. Johnston, as well as bupropion and lamotrigine.  8. Improved previously elevated creatinine:  He has a history of mild chronic kidney insufficiency with past creatinines previously in the 1.2 - 1.5 range since 2005, but the creatinine has been lower at ~ 1.1 over the past two+ years (likely because he is no longer taking supra-therapeutic testosterone doses).  9. History of prior mild mid-winter vitamin D deficiency:  He self-takes OTC vitamin D supplements most white including this past winter, although whether he needs that is questionable, because a  11/12/15 vitamin D level was adequate at 24.  Will repeat the vitamin D level at his next blood draw.  We previously discussed that he may not really need such supplementation, but he does not take enough to be harmful.  10. Mild psoriasis:  Well-controlled with topical Clobex prn.  Not discussed today.  11. Health Care Maintenance:  Influenza vaccine given 10/18/17.  Menactra vaccine given 11/18/15.  Tdap was boosted 1/21/15.  Prevnar 13 given 10/16/13; Pneumovax given 1/21/15.  HAV / HBV immunized / seroimmune.  HCV negative 6/1/09.  Negative rectal Pap done by Dr. Sanchez 6/28/10.  Repeat RPR was  negative on 11/12/15; will repeat with next blood draw (had a positive antitreponemal antibody on 8/27/09).  Quantiferon Gold was negative 11/12/15.  Sees a dentist > annually.  Sees a sleep apnea specialist for Cpap.  Saw an ophthalmologist in early 2012.  Now newly due for a routine screening colonoscopy, but he declined at last appointment -- will pursue at next appointment again.

## 2018-05-29 ENCOUNTER — TELEPHONE (OUTPATIENT)
Dept: PHARMACY | Facility: CLINIC | Age: 53
End: 2018-05-29

## 2018-05-29 NOTE — TELEPHONE ENCOUNTER
Attempted to contact the patient to for refill reminder call,  left message on voicemail    Follow-up Date: 06/01

## 2018-05-30 ENCOUNTER — TELEPHONE (OUTPATIENT)
Dept: PHARMACY | Facility: CLINIC | Age: 53
End: 2018-05-30

## 2018-05-30 NOTE — TELEPHONE ENCOUNTER
Pt called back to order refills.   [facility] [employer] [address]  prescriptions address has been verified.     Follow-up Date: 06/26/18    Brunilda Norton [facility]  [phone]

## 2018-07-02 ENCOUNTER — TELEPHONE (OUTPATIENT)
Dept: PHARMACY | Facility: CLINIC | Age: 53
End: 2018-07-02

## 2018-07-02 NOTE — TELEPHONE ENCOUNTER
Called patient for refill reminder.    [facility] [employer] [address] prescriptions address has been verified.     Follow-up Date: 07/26/18    Brunilda Norton, [facility]  [phone]

## 2018-07-31 ENCOUNTER — TELEPHONE (OUTPATIENT)
Dept: PHARMACY | Facility: CLINIC | Age: 53
End: 2018-07-31

## 2018-07-31 NOTE — TELEPHONE ENCOUNTER
Attempted to contact the patient to for refill reminder call,  left message on voicemail    Follow-up Date: 08/02/18 2nd attempt    Brunilda Norton, [facility]  [phone]

## 2018-08-02 DIAGNOSIS — E34.9 HYPOTESTOSTERONISM: ICD-10-CM

## 2018-08-02 RX ORDER — TESTOSTERONE 30 MG/1.5ML
2 SOLUTION TOPICAL DAILY
Qty: 90 ML | Refills: 5 | Status: SHIPPED | OUTPATIENT
Start: 2018-08-02 | End: 2019-06-18

## 2018-08-02 NOTE — TELEPHONE ENCOUNTER
Per Dr. Latham, OK to refill pt's testosterone script. Called in to pharmacy as ordered.  Dayana Thomas RN

## 2018-09-04 ENCOUNTER — TELEPHONE (OUTPATIENT)
Dept: PHARMACY | Facility: CLINIC | Age: 53
End: 2018-09-04

## 2018-09-04 NOTE — TELEPHONE ENCOUNTER
Called patient for refill reminder.    [facility] [employer]  prescriptions address has been verified.     6 month of on time refill.    Follow-up Date: 09/27/18    Brunilda Norton, [facility]  [phone]

## 2018-09-06 DIAGNOSIS — E78.1 HYPERTRIGLYCERIDEMIA: ICD-10-CM

## 2018-09-06 RX ORDER — LEVOTHYROXINE SODIUM 100 UG/1
TABLET ORAL
Qty: 180 TABLET | Refills: 2 | Status: SHIPPED | OUTPATIENT
Start: 2018-09-06 | End: 2019-06-12

## 2018-10-02 ENCOUNTER — TELEPHONE (OUTPATIENT)
Dept: PHARMACY | Facility: CLINIC | Age: 53
End: 2018-10-02

## 2018-10-02 NOTE — TELEPHONE ENCOUNTER
Attempted to contact the patient to for refill reminder call,  left message on voicemail    Follow-up Date: 10/05/18  2ND ATTEMPT      Brunilda Norton, [facility]  [phone]

## 2018-10-12 DIAGNOSIS — Z21 HUMAN IMMUNODEFICIENCY VIRUS I INFECTION (H): ICD-10-CM

## 2018-10-12 DIAGNOSIS — E03.9 ACQUIRED HYPOTHYROIDISM: ICD-10-CM

## 2018-10-12 DIAGNOSIS — E78.00 HYPERCHOLESTEROLEMIA: ICD-10-CM

## 2018-10-12 DIAGNOSIS — E29.1 HYPOTESTOSTERONEMIA IN MALE: ICD-10-CM

## 2018-10-12 DIAGNOSIS — E55.9 VITAMIN D DEFICIENCY: ICD-10-CM

## 2018-10-12 LAB
ALBUMIN SERPL-MCNC: 4.5 G/DL (ref 3.4–5)
ALP SERPL-CCNC: 68 U/L (ref 40–150)
ALT SERPL W P-5'-P-CCNC: 32 U/L (ref 0–70)
ANION GAP SERPL CALCULATED.3IONS-SCNC: 9 MMOL/L (ref 3–14)
AST SERPL W P-5'-P-CCNC: 18 U/L (ref 0–45)
BASOPHILS # BLD AUTO: 0.1 10E9/L (ref 0–0.2)
BASOPHILS NFR BLD AUTO: 0.8 %
BILIRUB SERPL-MCNC: 0.4 MG/DL (ref 0.2–1.3)
BUN SERPL-MCNC: 16 MG/DL (ref 7–30)
CALCIUM SERPL-MCNC: 9.2 MG/DL (ref 8.5–10.1)
CD3 CELLS # BLD: 2781 CELLS/UL (ref 603–2990)
CD3 CELLS NFR BLD: 90 % (ref 49–84)
CD3+CD4+ CELLS # BLD: 1039 CELLS/UL (ref 441–2156)
CD3+CD4+ CELLS NFR BLD: 34 % (ref 28–63)
CD3+CD4+ CELLS/CD3+CD8+ CLL BLD: 0.57 % (ref 1.4–2.6)
CD3+CD8+ CELLS # BLD: 1856 CELLS/UL (ref 125–1312)
CD3+CD8+ CELLS NFR BLD: 60 % (ref 10–40)
CHLORIDE SERPL-SCNC: 106 MMOL/L (ref 94–109)
CHOLEST SERPL-MCNC: 216 MG/DL
CK SERPL-CCNC: 144 U/L (ref 30–300)
CO2 SERPL-SCNC: 24 MMOL/L (ref 20–32)
CREAT SERPL-MCNC: 1.24 MG/DL (ref 0.66–1.25)
DIFFERENTIAL METHOD BLD: ABNORMAL
EOSINOPHIL # BLD AUTO: 0.2 10E9/L (ref 0–0.7)
EOSINOPHIL NFR BLD AUTO: 2.6 %
ERYTHROCYTE [DISTWIDTH] IN BLOOD BY AUTOMATED COUNT: 13.2 % (ref 10–15)
GFR SERPL CREATININE-BSD FRML MDRD: 61 ML/MIN/1.7M2
GLUCOSE SERPL-MCNC: 105 MG/DL (ref 70–99)
HCT VFR BLD AUTO: 39.1 % (ref 40–53)
HDLC SERPL-MCNC: 54 MG/DL
HGB BLD-MCNC: 13.5 G/DL (ref 13.3–17.7)
IFC SPECIMEN: ABNORMAL
IMM GRANULOCYTES # BLD: 0 10E9/L (ref 0–0.4)
IMM GRANULOCYTES NFR BLD: 0.4 %
LDLC SERPL CALC-MCNC: 115 MG/DL
LIPASE SERPL-CCNC: 108 U/L (ref 73–393)
LYMPHOCYTES # BLD AUTO: 2.8 10E9/L (ref 0.8–5.3)
LYMPHOCYTES NFR BLD AUTO: 37.1 %
MCH RBC QN AUTO: 36.8 PG (ref 26.5–33)
MCHC RBC AUTO-ENTMCNC: 34.5 G/DL (ref 31.5–36.5)
MCV RBC AUTO: 107 FL (ref 78–100)
MONOCYTES # BLD AUTO: 0.5 10E9/L (ref 0–1.3)
MONOCYTES NFR BLD AUTO: 6.5 %
NEUTROPHILS # BLD AUTO: 3.9 10E9/L (ref 1.6–8.3)
NEUTROPHILS NFR BLD AUTO: 52.6 %
NONHDLC SERPL-MCNC: 162 MG/DL
NRBC # BLD AUTO: 0 10*3/UL
NRBC BLD AUTO-RTO: 0 /100
PLATELET # BLD AUTO: 365 10E9/L (ref 150–450)
POTASSIUM SERPL-SCNC: 3.9 MMOL/L (ref 3.4–5.3)
PROT SERPL-MCNC: 8 G/DL (ref 6.8–8.8)
RBC # BLD AUTO: 3.67 10E12/L (ref 4.4–5.9)
SODIUM SERPL-SCNC: 139 MMOL/L (ref 133–144)
TRIGL SERPL-MCNC: 234 MG/DL
TSH SERPL DL<=0.005 MIU/L-ACNC: 1.51 MU/L (ref 0.4–4)
WBC # BLD AUTO: 7.4 10E9/L (ref 4–11)

## 2018-10-13 LAB
DEPRECATED CALCIDIOL+CALCIFEROL SERPL-MC: 20 UG/L (ref 20–75)
RPR SER QL: NONREACTIVE
T PALLIDUM AB SER QL: REACTIVE

## 2018-10-16 LAB
HIV1 RNA # PLAS NAA DL=20: NORMAL {COPIES}/ML
HIV1 RNA SERPL NAA+PROBE-LOG#: NORMAL {LOG_COPIES}/ML
RPR SER QL: NONREACTIVE
T PALLIDUM AB SER QL AGGL: REACTIVE
TESTOST SERPL-MCNC: 522 NG/DL (ref 240–950)

## 2018-10-17 ENCOUNTER — OFFICE VISIT (OUTPATIENT)
Dept: INFECTIOUS DISEASES | Facility: CLINIC | Age: 53
End: 2018-10-17
Attending: INTERNAL MEDICINE
Payer: MEDICARE

## 2018-10-17 VITALS
HEIGHT: 69 IN | DIASTOLIC BLOOD PRESSURE: 86 MMHG | OXYGEN SATURATION: 97 % | WEIGHT: 152.8 LBS | TEMPERATURE: 98.2 F | BODY MASS INDEX: 22.63 KG/M2 | HEART RATE: 102 BPM | SYSTOLIC BLOOD PRESSURE: 129 MMHG

## 2018-10-17 DIAGNOSIS — Z23 NEED FOR INFLUENZA VACCINATION: ICD-10-CM

## 2018-10-17 DIAGNOSIS — E78.2 MIXED HYPERLIPIDEMIA: ICD-10-CM

## 2018-10-17 DIAGNOSIS — Z21 HUMAN IMMUNODEFICIENCY VIRUS I INFECTION (H): Primary | ICD-10-CM

## 2018-10-17 DIAGNOSIS — I10 ESSENTIAL HYPERTENSION: ICD-10-CM

## 2018-10-17 DIAGNOSIS — R73.09 ELEVATED GLUCOSE: ICD-10-CM

## 2018-10-17 PROCEDURE — G0008 ADMIN INFLUENZA VIRUS VAC: HCPCS

## 2018-10-17 PROCEDURE — G0463 HOSPITAL OUTPT CLINIC VISIT: HCPCS | Mod: 25

## 2018-10-17 PROCEDURE — 90686 IIV4 VACC NO PRSV 0.5 ML IM: CPT | Mod: ZF | Performed by: INTERNAL MEDICINE

## 2018-10-17 PROCEDURE — 25000128 H RX IP 250 OP 636: Mod: ZF | Performed by: INTERNAL MEDICINE

## 2018-10-17 RX ADMIN — INFLUENZA A VIRUS A/MICHIGAN/45/2015 X-275 (H1N1) ANTIGEN (FORMALDEHYDE INACTIVATED), INFLUENZA A VIRUS A/SINGAPORE/INFIMH-16-0019/2016 IVR-186 (H3N2) ANTIGEN (FORMALDEHYDE INACTIVATED), INFLUENZA B VIRUS B/PHUKET/3073/2013 ANTIGEN (FORMALDEHYDE INACTIVATED), AND INFLUENZA B VIRUS B/MARYLAND/15/2016 BX-69A ANTIGEN (FORMALDEHYDE INACTIVATED) 0.5 ML: 15; 15; 15; 15 INJECTION, SUSPENSION INTRAMUSCULAR at 18:57

## 2018-10-17 ASSESSMENT — PAIN SCALES - GENERAL: PAINLEVEL: NO PAIN (0)

## 2018-10-17 NOTE — MR AVS SNAPSHOT
After Visit Summary   10/17/2018    Dennys Bee    MRN: [id number]           Patient Information     Date Of Birth          1965        Visit Information        Provider Department      10/17/2018 6:00 PM Rc Latham MD [facility] and Infectious Diseases        Today's Diagnoses     Need for influenza vaccination    -  1    Human immunodeficiency virus I infection (H)           Follow-ups after your visit        Follow-up notes from your care team     Return in about 6 months (around 4/17/2019).      Your next 10 appointments already scheduled     Apr 12, 2019  1:00 PM CDT   LAB with  LAB   [facility] ([facility])    [address] [place] [address] Se  1st Floor  [facility] [address] [phone]           Please do not eat 10-12 hours before your appointment if you are coming in fasting for labs on lipids, cholesterol, or glucose (sugar). This does not apply to pregnant women. Water, hot tea and black coffee (with nothing added) are okay. Do not drink other fluids, diet soda or chew gum.            Apr 17, 2019  6:00 PM CDT   (Arrive by 5:45 PM)   Return Visit with Rc Latham MD   [facility] and Infectious Diseases ([facility])    [address]  [address]  [facility] [address] [phone]              Who to contact     If you have questions or need follow up information about today's clinic visit or your schedule please contact [facility] INFECTIOUS DISEAS[facility] directly at [phone].  Normal or non-critical lab and imaging results will be communicated to you by MyChart, letter or phone within 4 business days after the clinic has received the results. If you do not hear from us within 7 days, please contact the clinic through MyChart or phone. If you have a critical or abnormal lab result, we will notify you by phone as soon as possible.  Submit refill requests  "through [employer] or call your pharmacy and they will forward the refill request to us. Please allow 3 business days for your refill to be completed.          Additional Information About Your Visit        [employer] Information     [employer] gives you secure access to your electronic health record. If you see a primary care provider, you can also send messages to your care team and make appointments. If you have questions, please call your primary care clinic.  If you do not have a primary care provider, please call [phone] and they will assist you.        Care EveryWhere ID     This is your Care EveryWhere ID. This could be used by other organizations to access your [facility] medical records  [id number]        Your Vitals Were     Pulse Temperature Height Pulse Oximetry BMI (Body Mass Index)       102 98.2  F (36.8  C) (Oral) 1.753 m (5' 9\") 97% 22.56 kg/m2        Blood Pressure from Last 3 Encounters:   10/17/18 129/86   04/18/18 136/87   10/18/17 130/87    Weight from Last 3 Encounters:   10/17/18 69.3 kg (152 lb 12.8 oz)   04/18/18 70.4 kg (155 lb 4.8 oz)   10/18/17 71.2 kg (157 lb)              Today, you had the following     No orders found for display       Primary Care Provider Office Phone # Fax #    Rc Latham [phone]       [address] [place] [id number]        Equal Access to Services     [facility] : Hadii lalit ku hadasho Soahsan, waaxda luqadaha, qaybta kaalmada adesarinayada, trell guerrero. So [facility] [phone].    ATENCIÓN: Si habla español, tiene a solomon disposición servicios gratuitos de asistencia lingüística. Llame al [phone].    We comply with applicable federal civil rights laws and [place] laws. We do not discriminate on the basis of race, color, national origin, age, disability, sex, sexual orientation, or gender identity.            Thank you!     Thank you for choosing [facility] AND INFECTIOUS DISEASES  " for your care. Our goal is always to provide you with excellent care. Hearing back from our patients is one way we can continue to improve our services. Please take a few minutes to complete the written survey that you may receive in the mail after your visit with us. Thank you!             Your Updated Medication List - Protect others around you: Learn how to safely use, store and throw away your medicines at www.disposemymeds.org.          This list is accurate as of 10/17/18  6:57 PM.  Always use your most recent med list.                   Brand Name Dispense Instructions for use Diagnosis    amLODIPine 10 MG tablet    NORVASC    30 tablet    Take 1 tablet (10 mg) by mouth daily    Essential hypertension       aspirin 81 MG tablet      Take 1 tablet by mouth daily.        buPROPion 150 MG 12 hr tablet    WELLBUTRIN SR     Take 150 mg by mouth 3 times daily.        enalapril 20 MG tablet    VASOTEC    60 tablet    TAKE ONE TABLET BY MOUTH TWICE A DAY    HTN (hypertension)       fenofibrate 54 MG tablet     90 tablet    TAKE THREE TABLETS BY MOUTH EVERY DAY    Hypertriglyceridemia       lamiVUDine-zidovudine 150-300 MG per tablet    COMBIVIR    60 tablet    Take 1 tablet by mouth 2 times daily    Asymptomatic human immunodeficiency virus (HIV) infection status (H)       LAMOTRIGINE PO      Take  by mouth. Take 2 and 1/2 tablets daily        levothyroxine 100 MCG tablet    SYNTHROID/LEVOTHROID    180 tablet    TAKE TWO TABLETS BY MOUTH EVERY DAY    Hypertriglyceridemia       Multi-vitamin Tabs tablet      Take 1 tablet by mouth daily        nevirapine 200 MG tablet    VIRAMUNE    60 tablet    TAKE ONE TABLET BY MOUTH EVERY 12 HOURS    Asymptomatic human immunodeficiency virus (HIV) infection status (H)       pravastatin 10 MG tablet    PRAVACHOL    30 tablet    Take 1 tablet (10 mg) by mouth daily    Hypercholesterolemia       tadalafil 20 MG tablet    CIALIS    6 tablet    Take 1 tablet (20 mg) by mouth daily as  needed    Erectile dysfunction       testosterone 30 MG/ACT topical solution    AXIRON    90 mL    Place 2 pumps (60 mg) onto the skin daily    Hypotestosteronism

## 2018-10-17 NOTE — NURSING NOTE
"Injectable Influenza Immunization Documentation    1.  Has the patient received the information for the injectable influenza vaccine? YES     2. Is the patient 6 months of age or older? YES     3. Does the patient have any of the following contraindications?         Severe allergy to eggs? No     Severe allergic reaction to previous influenza vaccines? No   Severe allergy to latex? No       History of Guillain-[place]yville syndrome? No     Currently have a temperature greater than 100.4F? No        4.  Severely egg allergic patients should have flu vaccine eligibility assessed by an MD, RN, or pharmacist, and those who received flu vaccine should be observed for 15 min by an MD, RN, Pharmacist, Medical Technician, or member of clinic staff.\": YES    5. Latex-allergic patients should be given latex-free influenza vaccine Yes. Please reference the Vaccine latex table to determine if your clinic s product is latex-containing.       Vaccination given by Maty Jasso          "

## 2018-10-17 NOTE — NURSING NOTE
"Chief Complaint   Patient presents with     RECHECK     B20 6 month f/u     /86  Pulse 102  Temp 98.2  F (36.8  C) (Oral)  Ht 1.753 m (5' 9\")  Wt 69.3 kg (152 lb 12.8 oz)  SpO2 97%  BMI 22.56 kg/m2  Maty Jasso    "

## 2018-10-20 NOTE — PROGRESS NOTES
Division of Infectious Diseases and International Medicine  Department of Medicine        [facility] OUTPATIENT VISIT NOTE [place] [address]  [address] [place] [address]  Office: [phone]  Fax:  [phone]     HISTORY OF PRESENT ILLNESS:    Mr. Gadiel Bee is a 53 year old gentleman with asymptomatic HIV infection.  He returns to [facility] today for follow-up of ongoing Combivir one tablet PO BID and nevirapine 200 mg PO BID (since 5/03) as long-standing antiretroviral therapy. He is generally happy with the medications still and says he would only consider switching them if he thought they were giving ongoing lipoatrophy / lipodystrophy, but he has not noticed any focal new alterations in his body shape over the past couple of years, or more.  He is considering getting a new dose of Sculptra into his cheeks.  He has not missed any doses in months and lacks side effects.  He saw his long-time psychiatrist, Dr. Johnston (at the [facility] in [place]) earlier today and they are trying yet one more new psychiatric medication (the name was not recollected) in an attempt to improve his chronic depression (with anxiety / insomnia / agoraphobia).  His blood pressure (by infrequent home checks) appears still controlled on amlodipine 10 mg daily (increased 5/17/17, started 5/20/15) plus lisinopril (20 mg PO BID since 1/15).  He remains on fenofibrate 162 mg PO daily for hypertriglyceridemia and is now also on pravastatin 10 mg PO daily as of 4/18/18.  He has noticed no side effects from the Pravachol.  A non-fasting lipid panel on 10/12/18 showed an improved total cholesterol and LDL (down to 216 / 115) but still mildly elevated triglycerides (234).  He would like a routine screening colonoscopy now that he is older than age [age], but would prefer to have that done in [place] at [facility] or [facility], for ease of having someone drive him home from the  "procedure -- he will schedule that himself.  (He had a colonoscopy once previously at [facility] in [place] in 2003, possibly as a part of a clinical study -- they found \"cells\" which did not require any early follow-up then.)  He remains on Synthroid 200 mcg / day (since 3/14) for chronic hypothyroidism and uses topical testosterone, Axiron, two 30 mg pumps for hypotestosteronism / erectile dysfunction, without side effects or current concerns.  Beyond these topics, he otherwise has felt well of late and lacks new concerns or complaints today, including no recent fever, chills, night sweats, anorexia, increased fatigue, EENT symptoms, cough, resting or exertional dyspnea, chest pain, nausea, abdominal pain, diarrhea, rash, myalgias / arthralgias, dysuria, other genitourinary symptoms (beyond chronic stable nocturnal frequency), headache, or other new psychiatric or neurological symptoms.    PAST MEDICAL HISTORY:    1. HIV, diagnosed 7/93, previously treated with AZT, 3TC and Crixivan. He was then on AZT plus efavirenz, but in 5/03 began Combivir/Sustiva and has remained virologically suppressed on this regimen since.  2. Stable chronic stage 1 kidney disease:  Nephrology Clinic evaluation (including negative MRI scan) unremarkable in 2009.  3. Hyperlipidemia previously requiring multiple medications, more hypertriglyceridemia than hypercholesterolemia -- now on fenofibrate plus Pravachol which was added 4/18/18.   4. Hyperthyroidism on levothyroxine.   5. History of difficult-to-treat anxiety and depression -- followed for a number of years by psychiatrist Dr. Abebe Johnston in [place].   6. Hypertension treated with Vasotec.   7. Acute appendicitis with rupture in the summer of 2007.  8. Cholecystectomy in the summer of 2007.   9. 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  Followed there annually.   10. 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  Previously received Sculptra.   11. History of a low testosterone level, most recently checked in 1/09 with a normal free testosterone of 14.0 and a total testosterone in the normal range of 482, managed somewhat unorthodoxly by a now-retired Urologist until 7/13.  Since then, has continued taking biweekly home exogenous testosterone IM injections and has tapered them only marginally from ~ weekly to ~ every other week (or a bit less frequent).  Total testosterone level on 5/14/14 was 2926.  12. Syphilis diagnosed 5/08, peak titer of 1:256, completed a treatment course of IM penicillin times 3, last dose in 6/08.  13. Prior rectal warts, treated with Aldara cream.  14. Infected lower left cracked mandibular molar tooth, repaired 6/10.  15. Unintentional overdose of alcohol, hydrocodone, and clonazepam for which hospitalized in [place] in 10/12.  16. Mild intermittent controlled extensor limb psoriasis.  17. Sleep apnea, diagnosed elsewhere.    ALLERGIES:  PCN and iodine    CURRENT MEDICATIONS:  Current Outpatient Prescriptions   Medication Sig Dispense Refill     amLODIPine (NORVASC) 10 MG tablet Take 1 tablet (10 mg) by mouth daily 30 tablet 8     aspirin 81 MG tablet Take 1 tablet by mouth daily.       buPROPion (WELLBUTRIN SR) 150 MG 12 hr tablet Take 150 mg by mouth 3 times daily.       enalapril (VASOTEC) 20 MG tablet TAKE ONE TABLET BY MOUTH TWICE A DAY 60 tablet 8     fenofibrate 54 MG tablet TAKE THREE TABLETS BY MOUTH EVERY DAY 90 tablet 5     lamiVUDine-zidovudine (COMBIVIR) 150-300 MG per tablet Take 1 tablet by mouth 2 times daily 60 tablet 8     LAMOTRIGINE PO Take  by mouth. Take 2 and 1/2 tablets daily       levothyroxine (SYNTHROID/LEVOTHROID) 100 MCG tablet TAKE TWO TABLETS BY MOUTH EVERY  tablet 2     nevirapine (VIRAMUNE) 200 MG tablet TAKE ONE TABLET BY MOUTH EVERY 12  HOURS 60 tablet 8     pravastatin (PRAVACHOL) 10 MG tablet Take 1 tablet (10 mg) by mouth daily 30 tablet 5     testosterone (AXIRON) 30 MG/ACT topical solution Place 2 pumps (60 mg) onto the skin daily 90 mL 5     multivitamin, therapeutic with minerals (MULTI-VITAMIN) TABS Take 1 tablet by mouth daily       tadalafil (CIALIS) 20 MG tablet Take 1 tablet (20 mg) by mouth daily as needed (Patient not taking: Reported on 10/17/2018) 6 tablet 3     FAMILY HISTORY:  Family History   Problem Relation Age of Onset     Depression Father      Hyperlipidemia Father      Depression Other      Multiple family members.     HEART DISEASE Maternal Grandmother      Myocardial Infarction Maternal Grandfather      Sudden MI / death at age 51.     Hyperlipidemia Maternal Grandfather      Obesity Paternal Grandmother      Hyperlipidemia Other      Multiple family members (Parents, brother, sister, PGM)     SOCIAL HISTORY:  Lives alone in basement apartment in [place] abut spends much of his time at his parents home in [place] [facility] in the town he grew up in and where all his siblings still live.  Unemployed, on disability based on his depression diagnosis.  Sexually active, no single steady partner, mostly with other HIV seropositive men, but always discloses his status when with HIV seronegative partners and uses protection.  In the past, has visited friends in [place] for an extended stay.  Tobacco:  Long-standing, up to 2.5 ppd in the past.  Presently 1 ppd (but smokes only half of each cigarette).  Has quit for up to three weeks several times in the past; not interested in further attempts at reduction at present.  Has a treadmill in the basement of his parents home that he can use for indoor exercise during cold weather, although he does so infrequently.    REVIEW OF SYSTEMS:  As per HPI.  Complete ROS is otherwise negative.    PHYSICAL EXAMINATION:  General:  A personable, low-key, conversant, WDWN 53-year-old man in  "no discomfort.  Vital Signs:  /86  Pulse 102  Temp 98.2  F (36.8  C) (Oral)  Ht 1.753 m (5' 9\")  Wt 69.3 kg (152 lb 12.8 oz)  SpO2 97%  BMI 22.56 kg/m2 (weight is stable).  Skin:  No acute rash or skin lesion.  Head/Neck:  NCAT.  Stable bilateral mild buccal facial lipoatrophy.  External auditory canals lack discharge.  PERRL. EOMI.  Anicteric sclerae.  Conjunctivae are bilaterally uninjected.  Oropharynx manifests no erythema, exudate or lesion.  Dentition is normal.  Neck is supple without lymphadenopathy or thyromegaly.  Chest:  Bilaterally clear to auscultation.  CV:  RRR, mild tachycardia today, normal S1, S2 without gallop, murmur or rub.  Abdomen:  Bowel sounds active, soft, nontender, no organomegaly, old surgical scar.  Back:  No lower spine or CVA tenderness.  Extremities:  Distally warm, no edema.  Neurological:  Alert, oriented, memory / cognition / affect intact.  Gait is normal.    LABORATORY STUDIES (Reviewed with the patient during his appointment today):      Sodium 10/12/2018 139  133 - 144 mmol/L Final     Potassium 10/12/2018 3.9  3.4 - 5.3 mmol/L Final     Chloride 10/12/2018 106  94 - 109 mmol/L Final     Carbon Dioxide 10/12/2018 24  20 - 32 mmol/L Final     Anion Gap 10/12/2018 9  3 - 14 mmol/L Final     Glucose 10/12/2018 105* 70 - 99 mg/dL Final     Urea Nitrogen 10/12/2018 16  7 - 30 mg/dL Final     Creatinine 10/12/2018 1.24  0.66 - 1.25 mg/dL Final     GFR Estimate 10/12/2018 61  >60 mL/min/1.7m2 Final    Non  GFR Calc     GFR Estimate If Black 10/12/2018 74  >60 mL/min/1.7m2 Final    African American GFR Calc     Calcium 10/12/2018 9.2  8.5 - 10.1 mg/dL Final     Bilirubin Total 10/12/2018 0.4  0.2 - 1.3 mg/dL Final     Albumin 10/12/2018 4.5  3.4 - 5.0 g/dL Final     Protein Total 10/12/2018 8.0  6.8 - 8.8 g/dL Final     Alkaline Phosphatase 10/12/2018 68  40 - 150 U/L Final     ALT 10/12/2018 32  0 - 70 U/L Final     AST 10/12/2018 18  0 - 45 U/L Final     " WBC 10/12/2018 7.4  4.0 - 11.0 10e9/L Final     RBC Count 10/12/2018 3.67* 4.4 - 5.9 10e12/L Final     Hemoglobin 10/12/2018 13.5  13.3 - 17.7 g/dL Final     Hematocrit 10/12/2018 39.1* 40.0 - 53.0 % Final     MCV 10/12/2018 107* 78 - 100 fl Final     MCH 10/12/2018 36.8* 26.5 - 33.0 pg Final     MCHC 10/12/2018 34.5  31.5 - 36.5 g/dL Final     RDW 10/12/2018 13.2  10.0 - 15.0 % Final     Platelet Count 10/12/2018 365  150 - 450 10e9/L Final     Diff Method 10/12/2018 Automated Method   Final     % Neutrophils 10/12/2018 52.6  % Final     % Lymphocytes 10/12/2018 37.1  % Final     % Monocytes 10/12/2018 6.5  % Final     % Eosinophils 10/12/2018 2.6  % Final     % Basophils 10/12/2018 0.8  % Final     % Immature Granulocytes 10/12/2018 0.4  % Final     Nucleated RBCs 10/12/2018 0  0 /100 Final     Absolute Neutrophil 10/12/2018 3.9  1.6 - 8.3 10e9/L Final     Absolute Lymphocytes 10/12/2018 2.8  0.8 - 5.3 10e9/L Final     Absolute Monocytes 10/12/2018 0.5  0.0 - 1.3 10e9/L Final     Absolute Eosinophils 10/12/2018 0.2  0.0 - 0.7 10e9/L Final     Absolute Basophils 10/12/2018 0.1  0.0 - 0.2 10e9/L Final     Abs Immature Granulocytes 10/12/2018 0.0  0 - 0.4 10e9/L Final     Absolute Nucleated RBC 10/12/2018 0.0   Final     HIV-1 RNA Quant Result 10/12/2018 HIV-1 RNA Not Detected  HIVND^HIV-1 RNA Not Detected [Copies]/mL Final    Comment: The BEV AmpliPrep/BEV TaqMan HIV-1 test is an FDA-approved in vitro   nucleic acid amplification test for the quantitation of HIV-1 RNA in human   plasma (EDTA plasma) using the BEV AmpliPrep instrument for automated viral   nucleic acid extraction and the BEV TaqMan Analyzer or BEV Accumetrics for   automated Real Time PCR amplification and detection of the viral nucleic acid   target.  Titer results are reported in copies/ml. This assay is intended for use in   conjunction with clinical presentation and other laboratory markers of disease   prognosis and for use as an aid in  assessing viral response to antiretroviral   treatment as measured by changes in plasma HIV-1 RNA levels. This test should   not be used as a donor screening test to confirm the presence of HIV-1   infection.       HIV RNA QT Log 10/12/2018 Not Calculated  <1.3 [Log_copies]/mL Final     IFC Specimen 10/12/2018 Blood   Final     CD3 Mature T 10/12/2018 90* 49 - 84 % Final     CD4 [place] T 10/12/2018 34  28 - 63 % Final     CD8 Suppressor T 10/12/2018 60* 10 - 40 % Final     CD4:CD8 Ratio 10/12/2018 0.57* 1.40 - 2.60 Final     Absolute CD3 10/12/2018 2781  603 - 2990 cells/uL Final     Absolute CD4 10/12/2018 1039  441 - 2156 cells/uL Final     Absolute CD8 10/12/2018 1856* 125 - 1312 cells/uL Final     Cholesterol 10/12/2018 216* <200 mg/dL Final    Desirable:       <200 mg/dl     Triglycerides 10/12/2018 234* <150 mg/dL Final    Comment: Borderline high:  150-199 mg/dl  High:             200-499 mg/dl  Very high:       >499 mg/dl       HDL Cholesterol 10/12/2018 54  >39 mg/dL Final     LDL Cholesterol Calculated 10/12/2018 115* <100 mg/dL Final    Comment: Above desirable:  100-129 mg/dl  Borderline High:  130-159 mg/dL  High:             160-189 mg/dL  Very high:       >189 mg/dl       Non HDL Cholesterol 10/12/2018 162* <130 mg/dL Final    Comment: Above Desirable:  130-159 mg/dl  Borderline high:  160-189 mg/dl  High:             190-219 mg/dl  Very high:       >219 mg/dl       TSH 10/12/2018 1.51  0.40 - 4.00 mU/L Final     Testosterone Total 10/12/2018 522  240 - 950 ng/dL Final    Comment: This test was developed and its performance characteristics determined by the   [facility],  Special Chemistry Laboratory. It has   not been cleared or approved by the FDA. The laboratory is regulated under   CLIA as qualified to perform high-complexity testing. This test is used for   clinical purposes. It should not be regarded as investigational or for   research.       CK Total 10/12/2018 144   30 - 300 U/L Final     Vitamin D Deficiency screening 10/12/2018 20  20 - 75 ug/L Final    Comment: Season, race, dietary intake, and treatment affect the concentration of   25-hydroxy-Vitamin D. Values may decrease during winter months and increase   during summer months. Values 20-29 ug/L may indicate Vitamin D insufficiency   and values <20 ug/L may indicate Vitamin D deficiency.  Vitamin D determination is routinely performed by an immunoassay specific for   25 hydroxyvitamin D3.  If an individual is on vitamin D2 (ergocalciferol)   supplementation, please specify 25 OH vitamin D2 and D3 level determination by   LCMSMS test [id number].       Treponema Antibodies 10/12/2018 Reactive* NR^Nonreactive Final    Comment: The Anti-Treponema Antibody screening tends to remain positive for life,   therefore it does not distinguish between active and past syphilis infections.   All positive and equivocal results will automatically reflex to a   non-specific Rapid Plasma Reagin (RPR) test.  If the Treponema Antibody is positive, and the RPR is positive, this is   presumptive evidence of current infection, inadequately treated infection,   persistent infection or reinfection.  If the Anti-Treponema Antibody is positive and the RPR is negative, then a   second Treponema specific test (TP-PA) will be performed to determine whether   the antibody test is falsely positive or is detecting early infection.  If the   latter is suspected, repeat testing in approximately two weeks is   recommended.       Lipase 10/12/2018 108  73 - 393 U/L Final     Rapid Plasma Reagin 10/12/2018 Nonreactive  NR^Nonreactive Final    Comment: This test should not be used as a primary diagnostic approach for syphilis,   and a serum specimen should be submitted for a treponemal-specific antibody   test.  Biological false-positive reactions with cardiolipin-type antigens have been   reported in disease such as infectious mononucleosis, leprosy, malaria,  lupus   erythematosus, vaccinia, and viral pneumonia.  Pregnancy, autoimmune diseases,   and narcotic additions may give false-positives. Pinta, yaws, bejel, and   other treponemal diseases may also produce false-positive results with this   test.  Specimen sent to [facility]) for   confirmation.       Rapid Plasma Reagin 10/12/2018 Nonreactive  NR^Nonreactive Final    Comment: This test should not be used as a primary diagnostic approach for syphilis,   and a serum specimen should be submitted for a treponemal-specific antibody   test.  Biological false-positive reactions with cardiolipin-type antigens have been   reported in disease such as infectious mononucleosis, leprosy, malaria, lupus   erythematosus, vaccinia, and viral pneumonia.  Pregnancy, autoimmune diseases,   and narcotic additions may give false-positives. Pinta, yaws, bejel, and   other treponemal diseases may also produce false-positive results with this   test.  Specimen sent to [facility]) for   confirmation.       T Pallidum by TP-PA conf 10/12/2018 Reactive* Non Reactive Final    Comment: (Note)  Performed by [facility],  [address] [place] [id number] [phone]  [email or web address], Terrence Naqvi MD, Lab. Director       ASSESSMENT/PLAN:    1. Stable HIV infection:  Mr. Bee remains on long-standing Combivir / nevirapine antiretroviral therapy with continued tight adherence, good tolerance, a high, normal absolute CD4+ cell count, and an undetectable HIV plasma viral load.  We discussed options for switching to an alternative once daily regimen again today.  We discussed that his current regimen is unlikely to promote further lipodystrophy, but I cannot guarantee that.  He is not in a hurry to switch regimen and would rather wait until next year (after he will have new health insurance) to make any switch.  We discussed that either Triumeq or Biktarvy (the  latter if concerned about lipodystrophic potential of the current medications) would be good options and he was (mildly) interested in considering a switch to Biktarvy in 2019.  He will remain on his same two medications for now and return to [facility] for follow-up in about six months, or as needed before then.  I have pre-ordered his laboratory studies for his next pre-appointment lab draw.  2. Stable, old lipoatrophy:  Especially of the cheeks.  There has been no obvious worsening in the past two to five years to my eye (or his), but the benefit of his prior Sculptra injections to the cheeks may be waning.  He is considering having that done once again.  3. Partially-controlled hyperlipidemia:  His 10/12/18 total cholesterol is a bit improved on pravastatin 10 mg PO daily added six months ago to fenofibrate 162 mg PO daily.  Despite past poor tolerance of atorvastatin, he has so far tolerated this initial low dose of pravastatin without side effects.   We will continue this dose for now, but if still well-tolerated and no improvement in his lipid profile at next blood draw, we will increase the dose at his next appointment.  4. Controlled essential hypertension:  He takes and tolerates amlodipine 10 mg PO daily (increased 5/17/17, started 5/20/15) and enalapril 20 mg PO BID (dose increased 1/27/15) well and has recently acceptable blood pressures in the clinic.  I again encouraged him to check occasional out-of-office blood pressures (at home).  I also again encouraged aerobic exercise (by using his treadmill).  5. BPH / nocturnal urinary frequency:  He had palpable prostatic enlargement on 10/18/17 digital rectal exam with a normal PSA screen.  His nocturia is not rapidly worsening.  He is not been interested in trying Flomax.  6. Treated hypothyroidism:  He feels well and his TSH was normal on 10/12/18 at 1.51, so he will remain on the same levothyroxine dose of 200 mcg / day (which was lowered in  3/14).  7. Testosterone replacement therapy / previous erectile dysfunction:  His most recent total testosterone level on 10/12/18 was normal at 522 on Axiron  transcutaneous testosterone topical replacement therapy at two (30 mg) pumps (= 60 mg) daily (since late 1/15).  He wishes to remain on that medication.  8. Chronic, intractable depression / anxiety / insomnia / agoraphobia:  To my informal view, this seems to be in remission over the past three years, but he continues to work on medication optimization with Dr. Johnston, his psychiatrist in the [facility] in [place], who he sees every two months.  9. Chronic stage 2 renal insufficiency:  He has a history of mild chronic kidney insufficiency with past creatinines previously in the 1.2 - 1.5 range since 2005, with a most recently stable creatinine of 1.24 on 10/12/18.  10. History of prior mild mid-winter vitamin D deficiency:  He self-takes OTC vitamin D supplements most white, although whether he needs that is questionable, because his 10/12/18 vitamin D level was adequate at 20.  11. Mild psoriasis:  Well-controlled with topical Clobex prn.  Not discussed today.  12. Health Care Maintenance:  Influenza vaccine given today.  Menactra vaccine given 11/18/15.  Tdap was boosted 1/21/15.  Prevnar 13 given 10/16/13; Pneumovax given 1/21/15.  HAV / HBV immunized / seroimmune.  HCV negative 6/1/09.  Negative rectal Pap done by Dr. Sanchez 6/28/10.  Repeat RPR was negative on 11/12/15; will repeat with next blood draw (had a positive antitreponemal antibody on 8/27/09).  Quantiferon Gold was negative 11/12/15.  Will check a screening hemoglobin A1C next blood draw because he has had several slightly elevated random glucoses over the past several years.  Sees a dentist > annually.  Sees a sleep apnea specialist for Cpap.  Saw an ophthalmologist in early 2012.  Plans to schedule himself for a routine screening colonoscopy at [facility] or [facility] in  [place] in the next few months.

## 2018-11-01 ENCOUNTER — TELEPHONE (OUTPATIENT)
Dept: PHARMACY | Facility: CLINIC | Age: 53
End: 2018-11-01

## 2018-11-01 DIAGNOSIS — E78.00 HYPERCHOLESTEROLEMIA: ICD-10-CM

## 2018-11-01 DIAGNOSIS — I10 ESSENTIAL HYPERTENSION: ICD-10-CM

## 2018-11-01 DIAGNOSIS — Z21 ASYMPTOMATIC HUMAN IMMUNODEFICIENCY VIRUS (HIV) INFECTION STATUS (H): ICD-10-CM

## 2018-11-01 DIAGNOSIS — I10 HTN (HYPERTENSION): ICD-10-CM

## 2018-11-01 DIAGNOSIS — E78.1 HYPERTRIGLYCERIDEMIA: ICD-10-CM

## 2018-11-01 RX ORDER — NEVIRAPINE 200 MG/1
TABLET ORAL
Qty: 180 TABLET | Refills: 1 | Status: SHIPPED | OUTPATIENT
Start: 2018-11-01 | End: 2019-05-16

## 2018-11-01 RX ORDER — ENALAPRIL MALEATE 20 MG/1
TABLET ORAL
Qty: 180 TABLET | Refills: 1 | Status: SHIPPED | OUTPATIENT
Start: 2018-11-01 | End: 2019-05-14

## 2018-11-01 RX ORDER — PRAVASTATIN SODIUM 10 MG
TABLET ORAL
Qty: 90 TABLET | Refills: 1 | Status: SHIPPED | OUTPATIENT
Start: 2018-11-01 | End: 2019-05-14

## 2018-11-01 RX ORDER — AMLODIPINE BESYLATE 10 MG/1
TABLET ORAL
Qty: 90 TABLET | Refills: 1 | Status: SHIPPED | OUTPATIENT
Start: 2018-11-01 | End: 2019-05-14

## 2018-11-01 RX ORDER — LAMIVUDINE AND ZIDOVUDINE 150; 300 MG/1; MG/1
1 TABLET, FILM COATED ORAL 2 TIMES DAILY
Qty: 180 TABLET | Refills: 1 | Status: SHIPPED | OUTPATIENT
Start: 2018-11-01 | End: 2019-05-16

## 2018-11-01 RX ORDER — FENOFIBRATE 54 MG/1
TABLET ORAL
Qty: 180 TABLET | Refills: 1 | Status: SHIPPED | OUTPATIENT
Start: 2018-11-01 | End: 2019-03-01

## 2018-11-01 NOTE — TELEPHONE ENCOUNTER
Called patient for refill reminder.    [facility] [employer] several prescriptions address has been verified.     Follow-up Date: 11/28/18    Brunilda Norton, [facility]  [phone]

## 2018-11-27 ENCOUNTER — TELEPHONE (OUTPATIENT)
Dept: PHARMACY | Facility: CLINIC | Age: 53
End: 2018-11-27

## 2018-11-27 NOTE — TELEPHONE ENCOUNTER
Attempted to contact the patient to for refill reminder call,  left message on voicemail    Follow-up Date: 11/29/18 2nd attempt    Brunilda Norton, [facility]  [phone]

## 2018-12-26 ENCOUNTER — TELEPHONE (OUTPATIENT)
Dept: PHARMACY | Facility: CLINIC | Age: 53
End: 2018-12-26

## 2018-12-26 NOTE — TELEPHONE ENCOUNTER
Attempted to contact the patient to for refill reminder call,  left message on voicemail and sent a text.     Follow-up Date: 12/28/18    Brunilda Norton, [facility]  [phone]

## 2019-01-04 NOTE — TELEPHONE ENCOUNTER
Pt called back.   Will [employer] prescriptions address has been verified.     Follow-up Date: 01/28/19    Brunilda Norton, [facility]  [phone]

## 2019-01-25 ENCOUNTER — TELEPHONE (OUTPATIENT)
Dept: PHARMACY | Facility: CLINIC | Age: 54
End: 2019-01-25

## 2019-01-25 NOTE — TELEPHONE ENCOUNTER
Called patient for refill reminder.    Will [employer] prescriptions address has been verified.     Follow-up Date: 02/27/19    Brunilda Norton, [facility]  [phone]

## 2019-02-28 ENCOUNTER — TELEPHONE (OUTPATIENT)
Dept: PHARMACY | Facility: CLINIC | Age: 54
End: 2019-02-28

## 2019-02-28 NOTE — TELEPHONE ENCOUNTER
Attempted to contact the patient to for refill reminder call,  left message on voicemail    Follow-up Date: 03/07/19    Brunilda Norton [facility]  [phone]

## 2019-03-01 DIAGNOSIS — E78.1 HYPERTRIGLYCERIDEMIA: ICD-10-CM

## 2019-03-01 RX ORDER — FENOFIBRATE 54 MG/1
TABLET ORAL
Qty: 90 TABLET | Refills: 5 | Status: SHIPPED | OUTPATIENT
Start: 2019-03-01 | End: 2019-06-21

## 2019-03-04 NOTE — TELEPHONE ENCOUNTER
Called patient for refill reminder.    Will [employer] prescriptions address has been verified.     Follow-up Date: 04/01/19    Brunilda Norton, [facility]  [phone]

## 2019-04-03 ENCOUNTER — DOCUMENTATION ONLY (OUTPATIENT)
Dept: CARE COORDINATION | Facility: CLINIC | Age: 54
End: 2019-04-03

## 2019-04-21 ENCOUNTER — TELEPHONE (OUTPATIENT)
Dept: PHARMACY | Facility: CLINIC | Age: 54
End: 2019-04-21

## 2019-04-21 NOTE — TELEPHONE ENCOUNTER
Called patient for refill reminder.    Will [employer] prescriptions address has been verified.     Follow-up Date: 05/06/19    Brunilda Norton, [facility]  [phone]

## 2019-05-14 ENCOUNTER — TELEPHONE (OUTPATIENT)
Dept: PHARMACY | Facility: CLINIC | Age: 54
End: 2019-05-14

## 2019-05-14 DIAGNOSIS — I10 ESSENTIAL HYPERTENSION: ICD-10-CM

## 2019-05-14 DIAGNOSIS — E78.00 HYPERCHOLESTEROLEMIA: ICD-10-CM

## 2019-05-14 DIAGNOSIS — I10 HTN (HYPERTENSION): ICD-10-CM

## 2019-05-14 NOTE — TELEPHONE ENCOUNTER
Pt called to order refills.   Patient will   Several  prescriptions on 05/15/19    Follow-up Date: 06/06/19    Brunilda Norton [facility]  [phone]

## 2019-05-15 RX ORDER — AMLODIPINE BESYLATE 10 MG/1
TABLET ORAL
Qty: 90 TABLET | Refills: 1 | Status: SHIPPED | OUTPATIENT
Start: 2019-05-15 | End: 2019-06-21

## 2019-05-15 RX ORDER — ENALAPRIL MALEATE 20 MG/1
TABLET ORAL
Qty: 180 TABLET | Refills: 1 | Status: SHIPPED | OUTPATIENT
Start: 2019-05-15 | End: 2019-06-21

## 2019-05-15 RX ORDER — PRAVASTATIN SODIUM 10 MG
TABLET ORAL
Qty: 30 TABLET | Refills: 1 | Status: SHIPPED | OUTPATIENT
Start: 2019-05-15 | End: 2019-06-21

## 2019-05-16 DIAGNOSIS — Z21 ASYMPTOMATIC HUMAN IMMUNODEFICIENCY VIRUS (HIV) INFECTION STATUS (H): ICD-10-CM

## 2019-05-16 RX ORDER — NEVIRAPINE 200 MG/1
TABLET ORAL
Qty: 180 TABLET | Refills: 3 | Status: SHIPPED | OUTPATIENT
Start: 2019-05-16 | End: 2020-05-05

## 2019-05-16 RX ORDER — LAMIVUDINE AND ZIDOVUDINE 150; 300 MG/1; MG/1
1 TABLET, FILM COATED ORAL 2 TIMES DAILY
Qty: 180 TABLET | Refills: 3 | Status: SHIPPED | OUTPATIENT
Start: 2019-05-16 | End: 2020-05-05

## 2019-06-06 ENCOUNTER — TELEPHONE (OUTPATIENT)
Dept: PHARMACY | Facility: CLINIC | Age: 54
End: 2019-06-06

## 2019-06-06 NOTE — TELEPHONE ENCOUNTER
Attempted to contact the patient to for refill reminder call,  left message on voicemail    Last filled on: 05/14/19    Follow-up Date: 06/11/19 2nd attempt    Brunilda Norton CPhT  [facility]  [phone]

## 2019-06-12 DIAGNOSIS — E78.1 HYPERTRIGLYCERIDEMIA: ICD-10-CM

## 2019-06-12 RX ORDER — LEVOTHYROXINE SODIUM 100 UG/1
TABLET ORAL
Qty: 60 TABLET | Refills: 0 | Status: SHIPPED | OUTPATIENT
Start: 2019-06-12 | End: 2019-06-21

## 2019-06-14 DIAGNOSIS — E78.2 MIXED HYPERLIPIDEMIA: ICD-10-CM

## 2019-06-14 DIAGNOSIS — R73.09 ELEVATED GLUCOSE: ICD-10-CM

## 2019-06-14 DIAGNOSIS — Z21 HUMAN IMMUNODEFICIENCY VIRUS I INFECTION (H): ICD-10-CM

## 2019-06-14 LAB
ALBUMIN SERPL-MCNC: 4.4 G/DL (ref 3.4–5)
ALP SERPL-CCNC: 57 U/L (ref 40–150)
ALT SERPL W P-5'-P-CCNC: 32 U/L (ref 0–70)
ANION GAP SERPL CALCULATED.3IONS-SCNC: 5 MMOL/L (ref 3–14)
AST SERPL W P-5'-P-CCNC: 23 U/L (ref 0–45)
BASOPHILS # BLD AUTO: 0.1 10E9/L (ref 0–0.2)
BASOPHILS NFR BLD AUTO: 0.9 %
BILIRUB SERPL-MCNC: 0.4 MG/DL (ref 0.2–1.3)
BUN SERPL-MCNC: 12 MG/DL (ref 7–30)
CALCIUM SERPL-MCNC: 9.1 MG/DL (ref 8.5–10.1)
CD3 CELLS # BLD: 2848 CELLS/UL (ref 603–2990)
CD3 CELLS NFR BLD: 90 % (ref 49–84)
CD3+CD4+ CELLS # BLD: 1007 CELLS/UL (ref 441–2156)
CD3+CD4+ CELLS NFR BLD: 32 % (ref 28–63)
CD3+CD4+ CELLS/CD3+CD8+ CLL BLD: 0.52 % (ref 1.4–2.6)
CD3+CD8+ CELLS # BLD: 1947 CELLS/UL (ref 125–1312)
CD3+CD8+ CELLS NFR BLD: 62 % (ref 10–40)
CHLORIDE SERPL-SCNC: 107 MMOL/L (ref 94–109)
CHOLEST SERPL-MCNC: 167 MG/DL
CO2 SERPL-SCNC: 24 MMOL/L (ref 20–32)
CREAT SERPL-MCNC: 1.07 MG/DL (ref 0.66–1.25)
DIFFERENTIAL METHOD BLD: ABNORMAL
EOSINOPHIL # BLD AUTO: 0.2 10E9/L (ref 0–0.7)
EOSINOPHIL NFR BLD AUTO: 2.9 %
ERYTHROCYTE [DISTWIDTH] IN BLOOD BY AUTOMATED COUNT: 12.9 % (ref 10–15)
GFR SERPL CREATININE-BSD FRML MDRD: 80 ML/MIN/{1.73_M2}
GLUCOSE SERPL-MCNC: 98 MG/DL (ref 70–99)
HBA1C MFR BLD: 5.6 % (ref 0–5.6)
HCT VFR BLD AUTO: 35.5 % (ref 40–53)
HDLC SERPL-MCNC: 58 MG/DL
HGB BLD-MCNC: 12.7 G/DL (ref 13.3–17.7)
IFC SPECIMEN: ABNORMAL
IMM GRANULOCYTES # BLD: 0 10E9/L (ref 0–0.4)
IMM GRANULOCYTES NFR BLD: 0.3 %
LDLC SERPL CALC-MCNC: 88 MG/DL
LIPASE SERPL-CCNC: 96 U/L (ref 73–393)
LYMPHOCYTES # BLD AUTO: 2.9 10E9/L (ref 0.8–5.3)
LYMPHOCYTES NFR BLD AUTO: 36.8 %
MCH RBC QN AUTO: 38.5 PG (ref 26.5–33)
MCHC RBC AUTO-ENTMCNC: 35.8 G/DL (ref 31.5–36.5)
MCV RBC AUTO: 108 FL (ref 78–100)
MONOCYTES # BLD AUTO: 0.5 10E9/L (ref 0–1.3)
MONOCYTES NFR BLD AUTO: 6.4 %
NEUTROPHILS # BLD AUTO: 4.1 10E9/L (ref 1.6–8.3)
NEUTROPHILS NFR BLD AUTO: 52.7 %
NONHDLC SERPL-MCNC: 108 MG/DL
NRBC # BLD AUTO: 0 10*3/UL
NRBC BLD AUTO-RTO: 0 /100
PLATELET # BLD AUTO: 315 10E9/L (ref 150–450)
POTASSIUM SERPL-SCNC: 4.1 MMOL/L (ref 3.4–5.3)
PROT SERPL-MCNC: 7.4 G/DL (ref 6.8–8.8)
RBC # BLD AUTO: 3.3 10E12/L (ref 4.4–5.9)
SODIUM SERPL-SCNC: 136 MMOL/L (ref 133–144)
TRIGL SERPL-MCNC: 105 MG/DL
WBC # BLD AUTO: 7.8 10E9/L (ref 4–11)

## 2019-06-14 PROCEDURE — 86360 T CELL ABSOLUTE COUNT/RATIO: CPT | Performed by: INTERNAL MEDICINE

## 2019-06-14 PROCEDURE — 86359 T CELLS TOTAL COUNT: CPT | Performed by: INTERNAL MEDICINE

## 2019-06-14 PROCEDURE — 87536 HIV-1 QUANT&REVRSE TRNSCRPJ: CPT | Performed by: INTERNAL MEDICINE

## 2019-06-17 ENCOUNTER — MYC MEDICAL ADVICE (OUTPATIENT)
Dept: INFECTIOUS DISEASES | Facility: CLINIC | Age: 54
End: 2019-06-17

## 2019-06-18 ENCOUNTER — OFFICE VISIT (OUTPATIENT)
Dept: INFECTIOUS DISEASES | Facility: CLINIC | Age: 54
End: 2019-06-18
Attending: INTERNAL MEDICINE
Payer: COMMERCIAL

## 2019-06-18 VITALS
DIASTOLIC BLOOD PRESSURE: 74 MMHG | BODY MASS INDEX: 23.35 KG/M2 | WEIGHT: 158.1 LBS | HEART RATE: 92 BPM | OXYGEN SATURATION: 96 % | SYSTOLIC BLOOD PRESSURE: 112 MMHG | TEMPERATURE: 98.3 F

## 2019-06-18 DIAGNOSIS — Z21 HUMAN IMMUNODEFICIENCY VIRUS I INFECTION (H): Primary | ICD-10-CM

## 2019-06-18 DIAGNOSIS — F32.A CHRONIC DEPRESSION: ICD-10-CM

## 2019-06-18 DIAGNOSIS — E34.9 HYPOTESTOSTERONISM: ICD-10-CM

## 2019-06-18 DIAGNOSIS — E03.9 ACQUIRED HYPOTHYROIDISM: ICD-10-CM

## 2019-06-18 DIAGNOSIS — I10 ESSENTIAL HYPERTENSION: ICD-10-CM

## 2019-06-18 DIAGNOSIS — E78.2 MIXED HYPERLIPIDEMIA: ICD-10-CM

## 2019-06-18 PROCEDURE — G0463 HOSPITAL OUTPT CLINIC VISIT: HCPCS | Mod: ZF

## 2019-06-18 RX ORDER — TESTOSTERONE 30 MG/1.5ML
1 SOLUTION TOPICAL DAILY
Qty: 90 ML | Refills: 5 | Status: SHIPPED | OUTPATIENT
Start: 2019-06-18 | End: 2020-01-03

## 2019-06-18 RX ORDER — LAMOTRIGINE 200 MG/1
TABLET ORAL
COMMUNITY
Start: 2018-11-27 | End: 2022-08-12

## 2019-06-18 ASSESSMENT — PAIN SCALES - GENERAL: PAINLEVEL: NO PAIN (0)

## 2019-06-18 NOTE — NURSING NOTE
"Chief Complaint   Patient presents with     RECHECK     6 month b20 f/u     Vital signs:  Temp: 98.3  F (36.8  C) Temp src: Oral BP: 112/74 Pulse: 92     SpO2: 96 %       Weight: 71.7 kg (158 lb 1.6 oz)  Estimated body mass index is 23.35 kg/m  as calculated from the following:    Height as of 4/17/19: 1.753 m (5' 9\").    Weight as of this encounter: 71.7 kg (158 lb 1.6 oz).        Jacquie Cheatham    "

## 2019-06-21 RX ORDER — FENOFIBRATE 54 MG/1
104 TABLET ORAL DAILY
Qty: 60 TABLET | Refills: 5 | Status: SHIPPED | OUTPATIENT
Start: 2019-06-19 | End: 2020-01-24

## 2019-06-21 RX ORDER — ENALAPRIL MALEATE 20 MG/1
20 TABLET ORAL 2 TIMES DAILY
Qty: 180 TABLET | Refills: 3 | Status: SHIPPED | OUTPATIENT
Start: 2019-06-19 | End: 2020-06-17

## 2019-06-21 RX ORDER — PRAVASTATIN SODIUM 10 MG
10 TABLET ORAL DAILY
Qty: 90 TABLET | Refills: 3 | Status: SHIPPED | OUTPATIENT
Start: 2019-06-19 | End: 2020-06-17

## 2019-06-21 RX ORDER — LEVOTHYROXINE SODIUM 100 UG/1
TABLET ORAL
Qty: 180 TABLET | Refills: 3 | Status: SHIPPED | OUTPATIENT
Start: 2019-06-19 | End: 2020-06-05

## 2019-06-21 RX ORDER — AMLODIPINE BESYLATE 10 MG/1
10 TABLET ORAL DAILY
Qty: 90 TABLET | Refills: 3 | Status: SHIPPED | OUTPATIENT
Start: 2019-06-19 | End: 2020-06-17

## 2019-06-21 NOTE — PROGRESS NOTES
Division of Infectious Diseases and International Medicine  Department of Medicine        [facility] OUTPATIENT VISIT NOTE [place] Mail Code [address]  [address] [place] [address]  Office: [phone]  Fax:  [phone]     HISTORY OF PRESENT ILLNESS:    Mr. Bee is a 53 year old gentleman with asymptomatic HIV infection who returns today to clinic for semi-annual follow-up of his antiretroviral therapy with Combivir one tablet PO BID and nevirapine 200 mg PO BID (since 5/03) as long-standing antiretroviral therapy. He almost never misses a dose, notices no side effects, still likes this regimen, and is not interested in switching to a different antiretroviral combination drug.  He continues to complain of chronic depression, saying that he was barely able to get out of bed for most of January and has continued to have mood problems during the subsequent cold months, but he saw his long-time psychiatrist, Dr. Johnston (at the [facility] in [place]) again last week and they decided to retry a medication he had been on more than a decade ago, nefazodone (Serzone).  So far, between that and especially the warmer weather with longer days, he feels somewhat lighter of late.  He is trying to get out for a walk or other exercise daily now.  He underwent a routine screening colonoscopy at [facility] in [place], [place], and it was normal with no polyps (per his report -- they are not in Care Everywhere) except for some diverticulosis -- a recommendation was given to eat more fiber.  His home blood pressure checks remain controlled on amlodipine 10 mg daily (increased 5/17/17, started 5/20/15) plus lisinopril (20 mg PO BID since 1/15).  He remains on Synthroid 200 mcg / day (since 3/14) for chronic hypothyroidism and uses topical testosterone, Axiron, two 30 mg pumps (often only taking it every other day) for hypotestosteronism / erectile dysfunction, without  side effects or current concerns.  He remains on pravastatin 10 mg PO daily plus fenofibrate 162 mg PO daily.  He continues to notice no pravastatin side effects (even though he previously tolerated atorvastatin poorly in the distant past).  Beyond the depression, he has otherwise been healthy in recent months and lacks new complaints today, including no recent fever, chills, night sweats, anorexia, unusual fatigue, EENT symptoms, cough, dyspnea, chest pain, nausea, abdominal pain, diarrhea, rash, myalgias / arthralgias, genitourinary symptoms (beyond chronic stable nocturnal frequency), headache, or other new neurological symptoms.    PAST MEDICAL HISTORY:    1. HIV, diagnosed 7/93, previously treated with AZT, 3TC and Crixivan. He was then on AZT plus efavirenz, but in 5/03 began Combivir/Sustiva and has remained virologically suppressed on this regimen since.  2. Stable chronic stage 1 kidney disease:  Nephrology Clinic evaluation (including negative MRI scan) unremarkable in 2009.  3. Hyperlipidemia previously requiring multiple medications, more hypertriglyceridemia than hypercholesterolemia -- now on fenofibrate plus Pravachol which was added 4/18/18.   4. Hyperthyroidism on levothyroxine.   5. History of difficult-to-treat anxiety and depression -- followed for a number of years by psychiatrist Dr. Abebe Johnston in [place].   6. Hypertension treated with Vasotec.   7. Acute appendicitis with rupture in the summer of 2007.  8. Cholecystectomy in the summer of 2007.   9. 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  Followed there annually.   10. 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  Previously received Sculptra.   11. History of a low testosterone level, most recently checked in 1/09 with a normal free testosterone of 14.0  and a total testosterone in the normal range of 482, managed somewhat unorthodoxly by a now-retired Urologist until 7/13.  Since then, has continued taking biweekly home exogenous testosterone IM injections and has tapered them only marginally from ~ weekly to ~ every other week (or a bit less frequent).  Total testosterone level on 5/14/14 was 2926.  12. Syphilis diagnosed 5/08, peak titer of 1:256, completed a treatment course of IM penicillin times 3, last dose in 6/08.  13. Prior rectal warts, treated with Aldara cream.  14. Infected lower left cracked mandibular molar tooth, repaired 6/10.  15. Unintentional overdose of alcohol, hydrocodone, and clonazepam for which hospitalized in [place] in 10/12.  16. Mild intermittent controlled extensor limb psoriasis.  17. Sleep apnea, diagnosed elsewhere.    Past Surgical History:   Procedure Laterality Date     APPENDECTOMY OPEN  Summer 2007.    For acute appendicitis with rupture.     CHOLECYSTECTOMY  2007.     ALLERGIES:  PCN and iodine    CURRENT MEDICATIONS:  Current Outpatient Medications   Medication Sig Dispense Refill     amLODIPine (NORVASC) 10 MG tablet TAKE ONE TABLET BY MOUTH EVERY DAY 90 tablet 1     aspirin 81 MG tablet Take 1 tablet by mouth daily.       buPROPion (WELLBUTRIN SR) 150 MG 12 hr tablet Take 150 mg by mouth 3 times daily.       enalapril (VASOTEC) 20 MG tablet TAKE ONE TABLET BY MOUTH TWICE A  tablet 1     fenofibrate (LOFIBRA) 54 MG tablet TAKE THREE TABLETS BY MOUTH EVERY DAY 90 tablet 5     lamiVUDine-zidovudine (COMBIVIR) 150-300 MG per tablet Take 1 tablet by mouth 2 times daily 180 tablet 3     lamoTRIgine (LAMICTAL) 200 MG tablet        levothyroxine (SYNTHROID/LEVOTHROID) 100 MCG tablet TAKE TWO TABLETS BY MOUTH EVERY DAY 60 tablet 0     multivitamin, therapeutic with minerals (MULTI-VITAMIN) TABS Take 1 tablet by mouth daily       nevirapine (VIRAMUNE) 200 MG tablet TAKE ONE TABLET BY MOUTH EVERY 12 HOURS 180 tablet 3      pravastatin (PRAVACHOL) 10 MG tablet TAKE ONE TABLET BY MOUTH EVERY DAY 30 tablet 1     tadalafil (CIALIS) 20 MG tablet Take 1 tablet (20 mg) by mouth daily as needed 6 tablet 3     testosterone (AXIRON) 30 MG/ACT topical solution Place 1 pump (30 mg) onto the skin daily 90 mL 5     FAMILY HISTORY:  Family History   Problem Relation Age of Onset     Depression Father      Hyperlipidemia Father      Depression Other         Multiple family members.     Heart Disease Maternal Grandmother      Myocardial Infarction Maternal Grandfather         Sudden MI / death at age 51.     Hyperlipidemia Maternal Grandfather      Obesity Paternal Grandmother      Hyperlipidemia Other         Multiple family members (Parents, brother, sister, PGM)     SOCIAL HISTORY:  Lives alone in basement apartment in [place] abut spends much of his time at his parents home in [place] in the town he grew up in and where all his siblings still live.  Unemployed, on disability based on his depression diagnosis.  Sexually active, no single steady partner, mostly with other HIV seropositive men, but always discloses his status when with HIV seronegative partners and uses protection.  In the past, has visited friends in [place] for an extended stay.  Tobacco:  Long-standing, up to 2.5 ppd in the past.  Presently 1 ppd (but smokes only half of each cigarette).  Has quit for up to three weeks several times in the past; not interested in further attempts at reduction at present.  Has a treadmill in the basement of his parents home that he can use for indoor exercise during cold weather, although he does so infrequently.    REVIEW OF SYSTEMS:  As per HPI.  Complete ROS is otherwise negative.    PHYSICAL EXAMINATION:  General:  A pleasant, conversant, WDWN 53-year-old man in no discomfort.  Vital Signs:  /74   Pulse 92   Temp 98.3  F (36.8  C) (Oral)   Wt 71.7 kg (158 lb 1.6 oz)   SpO2 96%   BMI 23.35 kg/m   (weight stable).   Skin:  No new rash or skin lesion.  Head/Neck:  NCAT.  Unchanged bilateral mild buccal facial lipoatrophy.  Scattered chronic keratoses.  External auditory canals are bilaterally patent without discharge.  PERRL. EOMI.  Sclerae are white.  Conjunctivae are bilaterally uninjected and pink.  Oropharynx lacks erythema, exudate or lesion.  Dentition appears acceptable.  Neck is supple without lymphadenopathy or thyromegaly.  Lungs:  Bilaterally clear to auscultation.  CV:  RRR, mild tachycardia today, normal S1, S2 without gallop, murmur or rub.  Abdomen:  Bowel sounds normal, soft, nontender, no hepatomegaly, old surgical scar.  Back:  No low back or CVA tenderness.  Extremities:  Distally warm, no edema.  Neurological:  Alert, oriented, memory / cognition / affect intact.  Gait is normal.    LABORATORY STUDIES (Reviewed with the patient during his appointment today):      Hemoglobin A1C 06/14/2019 5.6  0 - 5.6 % Final    Comment: Normal <5.7% Prediabetes 5.7-6.4%  Diabetes 6.5% or higher - adopted from ADA   consensus guidelines.       Lipase 06/14/2019 96  73 - 393 U/L Final     Cholesterol 06/14/2019 167  <200 mg/dL Final     Triglycerides 06/14/2019 105  <150 mg/dL Final     HDL Cholesterol 06/14/2019 58  >39 mg/dL Final     LDL Cholesterol Calculated 06/14/2019 88  <100 mg/dL Final    Desirable:       <100 mg/dl     Non HDL Cholesterol 06/14/2019 108  <130 mg/dL Final     IFC Specimen 06/14/2019 Blood   Final     CD3 Mature T 06/14/2019 90* 49 - 84 % Final     CD4 Robinson T 06/14/2019 32  28 - 63 % Final     CD8 Suppressor T 06/14/2019 62* 10 - 40 % Final     CD4:CD8 Ratio 06/14/2019 0.52* 1.40 - 2.60 Final     Absolute CD3 06/14/2019 2,848  603 - 2,990 cells/uL Final     Absolute CD4 06/14/2019 1,007  441 - 2,156 cells/uL Final     Absolute CD8 06/14/2019 1,947* 125 - 1,312 cells/uL Final     HIV-1 RNA Quant Result 06/14/2019 HIV-1 RNA Not Detected  HIVND^HIV-1 RNA Not Detected [Copies]/mL Final    Comment: The  BEV AmpliPrep/BEV TaqMan HIV-1 test is an FDA-approved in vitro   nucleic acid amplification test for the quantitation of HIV-1 RNA in human   plasma (EDTA plasma) using the BEV AmpliPrep instrument for automated viral   nucleic acid extraction and the BEV TaqMan Analyzer or BEV TaqMan for   automated Real Time PCR amplification and detection of the viral nucleic acid   target.  Titer results are reported in copies/ml. This assay is intended for use in   conjunction with clinical presentation and other laboratory markers of disease   prognosis and for use as an aid in assessing viral response to antiretroviral   treatment as measured by changes in plasma HIV-1 RNA levels. This test should   not be used as a donor screening test to confirm the presence of HIV-1   infection.       HIV RNA QT Log 06/14/2019 Not Calculated  <1.3 [Log_copies]/mL Final     WBC 06/14/2019 7.8  4.0 - 11.0 10e9/L Final     RBC Count 06/14/2019 3.30* 4.4 - 5.9 10e12/L Final     Hemoglobin 06/14/2019 12.7* 13.3 - 17.7 g/dL Final     Hematocrit 06/14/2019 35.5* 40.0 - 53.0 % Final     MCV 06/14/2019 108* 78 - 100 fl Final     MCH 06/14/2019 38.5* 26.5 - 33.0 pg Final     MCHC 06/14/2019 35.8  31.5 - 36.5 g/dL Final     RDW 06/14/2019 12.9  10.0 - 15.0 % Final     Platelet Count 06/14/2019 315  150 - 450 10e9/L Final     Diff Method 06/14/2019 Automated Method   Final     % Neutrophils 06/14/2019 52.7  % Final     % Lymphocytes 06/14/2019 36.8  % Final     % Monocytes 06/14/2019 6.4  % Final     % Eosinophils 06/14/2019 2.9  % Final     % Basophils 06/14/2019 0.9  % Final     % Immature Granulocytes 06/14/2019 0.3  % Final     Nucleated RBCs 06/14/2019 0  0 /100 Final     Absolute Neutrophil 06/14/2019 4.1  1.6 - 8.3 10e9/L Final     Absolute Lymphocytes 06/14/2019 2.9  0.8 - 5.3 10e9/L Final     Absolute Monocytes 06/14/2019 0.5  0.0 - 1.3 10e9/L Final     Absolute Eosinophils 06/14/2019 0.2  0.0 - 0.7 10e9/L Final     Absolute  Basophils 06/14/2019 0.1  0.0 - 0.2 10e9/L Final     Abs Immature Granulocytes 06/14/2019 0.0  0 - 0.4 10e9/L Final     Absolute Nucleated RBC 06/14/2019 0.0   Final     Sodium 06/14/2019 136  133 - 144 mmol/L Final     Potassium 06/14/2019 4.1  3.4 - 5.3 mmol/L Final     Chloride 06/14/2019 107  94 - 109 mmol/L Final     Carbon Dioxide 06/14/2019 24  20 - 32 mmol/L Final     Anion Gap 06/14/2019 5  3 - 14 mmol/L Final     Glucose 06/14/2019 98  70 - 99 mg/dL Final     Urea Nitrogen 06/14/2019 12  7 - 30 mg/dL Final     Creatinine 06/14/2019 1.07  0.66 - 1.25 mg/dL Final     GFR Estimate 06/14/2019 80  >60 mL/min/[1.73_m2] Final    Comment: Non  GFR Calc  Starting 12/18/2018, serum creatinine based estimated GFR (eGFR) will be   calculated using the Chronic Kidney Disease Epidemiology Collaboration   (CKD-EPI) equation.       GFR Estimate If Black 06/14/2019 >90  >60 mL/min/[1.73_m2] Final    Comment:  GFR Calc  Starting 12/18/2018, serum creatinine based estimated GFR (eGFR) will be   calculated using the Chronic Kidney Disease Epidemiology Collaboration   (CKD-EPI) equation.       Calcium 06/14/2019 9.1  8.5 - 10.1 mg/dL Final     Bilirubin Total 06/14/2019 0.4  0.2 - 1.3 mg/dL Final     Albumin 06/14/2019 4.4  3.4 - 5.0 g/dL Final     Protein Total 06/14/2019 7.4  6.8 - 8.8 g/dL Final     Alkaline Phosphatase 06/14/2019 57  40 - 150 U/L Final     ALT 06/14/2019 32  0 - 70 U/L Final     AST 06/14/2019 23  0 - 45 U/L Final     ASSESSMENT/PLAN:    1. Asymptomatic HIV infection:  He has a normal absolute CD4+ cell count and an undetectable HIV plasma viral load on long-standing antiretroviral therapy of Combivir / nevirapine with tight adherence and good tolerance.  He wishes to remain on these drugs rather than switching to an alternative once daily regimen.  (We previously discussed that either Triumeq or Biktarvy (the latter if concerned about lipodystrophic potential of the current  "medications) would be good options in the future, if desired.)  He will continue the present drugs and return to clinic for follow-up in about six months (with pre-labs), or as needed before then.  2. Stable, old cheeks lipoatrophy:  There is no change.  He previously received Sculptra injections to the cheeks and has been considering having such injections again.  3. Now well-controlled hyperlipidemia:  With additional of pravastatin 10 mg PO daily in early 2018 (which continues to tolerate well) and perhaps due to other factors (different psychiatric medications?), his most recent total cholesterol was markedly improved down to 167 on 6/14/19 (with an LDL of 88 and HDL of 58).  He also remains on on ongoing fenofibrate 162 mg PO daily, but his triglycerides were now down to 105 on 6/14/19, so we will decrease the fenofibrate dose down to 108 mg (two tablets) daily (rather than three) and then recheck the lipid levels at his next blood draw in six months.  4. Controlled essential hypertension:  On ongoing amlodipine 10 mg PO daily (increased 5/17/17, started 5/20/15) and enalapril 20 mg PO BID (dose increased 1/27/15) his blood pressure is well-controlled, both by office and home measurement.  We again discussed the importance of ramping up his aerobic exercise in the summertime.  5. Testosterone replacement therapy / previous erectile dysfunction:  His most recent total testosterone level on 10/12/18 was normal at 522 on Axiron  transcutaneous testosterone topical replacement therapy at two (30 mg) pumps (= 60 mg) daily (since late 1/15) -- he is sometimes applying it only every other day, however.  He wishes to remain on that medication, but we will decrease the dosing to \"one pump daily\" and I encouraged him to use it more consistently.  6. BPH / nocturnal urinary frequency:  He had palpable prostatic enlargement on 10/18/17 digital rectal exam with a normal PSA screen.  His nocturia is unchanged.  He is not " been interested in trying Flomax.  7. Treated hypothyroidism:  He feels well and his TSH was normal on 10/12/18 at 1.51, so he will continue on the same levothyroxine dose of 200 mcg / day (which was lowered in 3/14).  8. Chronic, intractable depression / anxiety / insomnia / agoraphobia:  He follows with Dr. Johnston, his psychiatrist in the [facility] in [place], who he sees every two months.  They have now added an additional medication, nefazodone (Serzone), which he is so far tolerating well.  There are no drug-drug interaction issues with his antiretrovirals and no major concerns with his other medications (warned to watch for hypotension from the potential for increased amlodipine concentrations from cy inhibition).  9. Chronic stage 2 renal insufficiency:  He has a history of mild chronic kidney insufficiency with past creatinines previously in the 1.2 - 1.5 range since , with a most recently stable creatinine of 1.07 on 19.  10. History of prior mild mid-winter vitamin D deficiency:  He self-takes OTC vitamin D supplements most white, although whether he needs that is questionable, because his 10/12/18 vitamin D level was adequate at 20.  11. Mild psoriasis:  Well-controlled with topical Clobex prn.  Not apparent today.  12. Health Care Maintenance:  Influenza vaccine given 10/17/18.  Menactra vaccine given 11/18/15.  Tdap was boosted 1/21/15.  Prevnar 13 given 10/16/13; Pneumovax given 1/21/15.  HAV / HBV immunized / seroimmune.  HCV negative 09.  Negative rectal Pap done by Dr. Sanchez 6/28/10.  Repeat RPR was negative on 11/12/15; will repeat with next blood draw (had a positive antitreponemal antibody on 09).  Quantiferon Gold was negative 11/12/15.  A screening hemoglobin A1C (because he has had several slightly elevated random glucoses over the past several years) was normal at 5.6% on 19.  Sees a dentist annually and due for an appointment for a cleaning and  cavity filling next week.  Sees a sleep apnea specialist for Cpap.  Saw an ophthalmologist in early 2012.  Had a  non-[facility] dermatologist appointment for a complete skin check in early 2019 and was told he had only chronic lentiga without any concerning lesions.  Underwent a routine screening colonoscopy at [facility] in [place], [place], in 1/19 which was normal (without polyps, per the patient) except for diverticulosis  -- encourage to intake more fiber.

## 2019-07-16 ENCOUNTER — TELEPHONE (OUTPATIENT)
Dept: PHARMACY | Facility: CLINIC | Age: 54
End: 2019-07-16

## 2019-07-16 NOTE — TELEPHONE ENCOUNTER
Called patient for refill reminder.    [facility] [employer] prescriptions address has been verified.       Last Filled on: 06/18/19   Follow-up Date: 08/14/19    Brunilda Norton CPhT  [facility]  [phone]

## 2019-08-14 ENCOUNTER — TELEPHONE (OUTPATIENT)
Dept: PHARMACY | Facility: CLINIC | Age: 54
End: 2019-08-14

## 2019-08-14 NOTE — TELEPHONE ENCOUNTER
Called patient for refill reminder.    Will [employer] prescriptions address has been verified.       Last Filled on: 07/16/19   Follow-up Date:  09/13/19    Brunilda Norton CPhT  [facility]  [phone]

## 2019-08-16 DIAGNOSIS — F32.A DEPRESSION, UNSPECIFIED DEPRESSION TYPE: Primary | ICD-10-CM

## 2019-08-16 RX ORDER — BUPROPION HYDROCHLORIDE 150 MG/1
150 TABLET, EXTENDED RELEASE ORAL 3 TIMES DAILY
Qty: 90 TABLET | Refills: 0 | OUTPATIENT
Start: 2019-08-16 | End: 2020-12-16

## 2019-09-16 ENCOUNTER — TELEPHONE (OUTPATIENT)
Dept: PHARMACY | Facility: CLINIC | Age: 54
End: 2019-09-16

## 2019-09-16 NOTE — TELEPHONE ENCOUNTER
Attempted to contact the patient to for refill reminder call,  left message on voicemail    Last filled on: 08/15/19    Follow-up Date: 10/11/19    Brunilda Norton CPhT  [facility]  [phone]

## 2019-10-01 ENCOUNTER — HEALTH MAINTENANCE LETTER (OUTPATIENT)
Age: 54
End: 2019-10-01

## 2019-10-17 ENCOUNTER — DOCUMENTATION ONLY (OUTPATIENT)
Dept: CARE COORDINATION | Facility: CLINIC | Age: 54
End: 2019-10-17

## 2019-10-23 ENCOUNTER — TELEPHONE (OUTPATIENT)
Dept: PHARMACY | Facility: CLINIC | Age: 54
End: 2019-10-23

## 2019-10-23 NOTE — TELEPHONE ENCOUNTER
Called patient for refill reminder.    [facility] [employer] prescriptions address has been verified.       Last Filled on: 09/24/19   Follow-up Date:  10/25/19    Brunilda Norton CPhT  [facility]  [phone]

## 2019-11-25 ENCOUNTER — TELEPHONE (OUTPATIENT)
Dept: PHARMACY | Facility: CLINIC | Age: 54
End: 2019-11-25

## 2019-11-25 NOTE — TELEPHONE ENCOUNTER
Attempted to contact the patient to for refill reminder call,  left message on voicemail    Last filled on: 10/24/19    Follow-up Date: 11/29/19 2nd attempt    Brunilda Norton CPhT  [facility]  [phone]

## 2019-12-13 DIAGNOSIS — E78.2 MIXED HYPERLIPIDEMIA: ICD-10-CM

## 2019-12-13 DIAGNOSIS — E03.9 ACQUIRED HYPOTHYROIDISM: ICD-10-CM

## 2019-12-13 DIAGNOSIS — Z21 HUMAN IMMUNODEFICIENCY VIRUS I INFECTION (H): ICD-10-CM

## 2019-12-13 LAB
ALBUMIN SERPL-MCNC: 4.4 G/DL (ref 3.4–5)
ALP SERPL-CCNC: 64 U/L (ref 40–150)
ALT SERPL W P-5'-P-CCNC: 22 U/L (ref 0–70)
ANION GAP SERPL CALCULATED.3IONS-SCNC: 6 MMOL/L (ref 3–14)
AST SERPL W P-5'-P-CCNC: 8 U/L (ref 0–45)
BASOPHILS # BLD AUTO: 0.1 10E9/L (ref 0–0.2)
BASOPHILS NFR BLD AUTO: 0.9 %
BILIRUB SERPL-MCNC: 0.4 MG/DL (ref 0.2–1.3)
BUN SERPL-MCNC: 12 MG/DL (ref 7–30)
CALCIUM SERPL-MCNC: 9.5 MG/DL (ref 8.5–10.1)
CD3 CELLS # BLD: 3092 CELLS/UL (ref 603–2990)
CD3 CELLS NFR BLD: 89 % (ref 49–84)
CD3+CD4+ CELLS # BLD: 1251 CELLS/UL (ref 441–2156)
CD3+CD4+ CELLS NFR BLD: 36 % (ref 28–63)
CD3+CD4+ CELLS/CD3+CD8+ CLL BLD: 0.64 % (ref 1.4–2.6)
CD3+CD8+ CELLS # BLD: 1960 CELLS/UL (ref 125–1312)
CD3+CD8+ CELLS NFR BLD: 56 % (ref 10–40)
CHLORIDE SERPL-SCNC: 105 MMOL/L (ref 94–109)
CHOLEST SERPL-MCNC: 189 MG/DL
CO2 SERPL-SCNC: 27 MMOL/L (ref 20–32)
CREAT SERPL-MCNC: 1.05 MG/DL (ref 0.66–1.25)
DIFFERENTIAL METHOD BLD: ABNORMAL
EOSINOPHIL # BLD AUTO: 0.2 10E9/L (ref 0–0.7)
EOSINOPHIL NFR BLD AUTO: 2.1 %
ERYTHROCYTE [DISTWIDTH] IN BLOOD BY AUTOMATED COUNT: 13.4 % (ref 10–15)
GFR SERPL CREATININE-BSD FRML MDRD: 81 ML/MIN/{1.73_M2}
GLUCOSE SERPL-MCNC: 103 MG/DL (ref 70–99)
HCT VFR BLD AUTO: 38.3 % (ref 40–53)
HDLC SERPL-MCNC: 76 MG/DL
HGB BLD-MCNC: 13.6 G/DL (ref 13.3–17.7)
IFC SPECIMEN: ABNORMAL
IMM GRANULOCYTES # BLD: 0 10E9/L (ref 0–0.4)
IMM GRANULOCYTES NFR BLD: 0.3 %
LDLC SERPL CALC-MCNC: 95 MG/DL
LYMPHOCYTES # BLD AUTO: 3.4 10E9/L (ref 0.8–5.3)
LYMPHOCYTES NFR BLD AUTO: 37.6 %
MCH RBC QN AUTO: 37.2 PG (ref 26.5–33)
MCHC RBC AUTO-ENTMCNC: 35.5 G/DL (ref 31.5–36.5)
MCV RBC AUTO: 105 FL (ref 78–100)
MONOCYTES # BLD AUTO: 0.4 10E9/L (ref 0–1.3)
MONOCYTES NFR BLD AUTO: 4.8 %
NEUTROPHILS # BLD AUTO: 5 10E9/L (ref 1.6–8.3)
NEUTROPHILS NFR BLD AUTO: 54.3 %
NONHDLC SERPL-MCNC: 114 MG/DL
NRBC # BLD AUTO: 0 10*3/UL
NRBC BLD AUTO-RTO: 0 /100
PLATELET # BLD AUTO: 349 10E9/L (ref 150–450)
POTASSIUM SERPL-SCNC: 3.6 MMOL/L (ref 3.4–5.3)
PROT SERPL-MCNC: 7.6 G/DL (ref 6.8–8.8)
RBC # BLD AUTO: 3.66 10E12/L (ref 4.4–5.9)
SODIUM SERPL-SCNC: 138 MMOL/L (ref 133–144)
TRIGL SERPL-MCNC: 96 MG/DL
TSH SERPL DL<=0.005 MIU/L-ACNC: 1.79 MU/L (ref 0.4–4)
WBC # BLD AUTO: 9.2 10E9/L (ref 4–11)

## 2019-12-13 PROCEDURE — 87536 HIV-1 QUANT&REVRSE TRNSCRPJ: CPT | Performed by: INTERNAL MEDICINE

## 2019-12-13 PROCEDURE — 86360 T CELL ABSOLUTE COUNT/RATIO: CPT | Performed by: INTERNAL MEDICINE

## 2019-12-13 PROCEDURE — 86359 T CELLS TOTAL COUNT: CPT | Performed by: INTERNAL MEDICINE

## 2019-12-15 ENCOUNTER — HEALTH MAINTENANCE LETTER (OUTPATIENT)
Age: 54
End: 2019-12-15

## 2019-12-17 ENCOUNTER — TELEPHONE (OUTPATIENT)
Dept: INFECTIOUS DISEASES | Facility: CLINIC | Age: 54
End: 2019-12-17

## 2019-12-17 NOTE — TELEPHONE ENCOUNTER
Left message for pt reminding them of upcoming appointment.  Instructed pt to bring updated medications list.  christopher hua

## 2019-12-18 ENCOUNTER — TRANSFERRED RECORDS (OUTPATIENT)
Dept: HEALTH INFORMATION MANAGEMENT | Facility: CLINIC | Age: 54
End: 2019-12-18

## 2019-12-18 ENCOUNTER — OFFICE VISIT (OUTPATIENT)
Dept: INFECTIOUS DISEASES | Facility: CLINIC | Age: 54
End: 2019-12-18
Attending: INTERNAL MEDICINE
Payer: COMMERCIAL

## 2019-12-18 VITALS
BODY MASS INDEX: 22.76 KG/M2 | HEART RATE: 83 BPM | SYSTOLIC BLOOD PRESSURE: 134 MMHG | WEIGHT: 154.1 LBS | OXYGEN SATURATION: 100 % | DIASTOLIC BLOOD PRESSURE: 85 MMHG

## 2019-12-18 DIAGNOSIS — E78.5 HYPERLIPIDEMIA, UNSPECIFIED HYPERLIPIDEMIA TYPE: ICD-10-CM

## 2019-12-18 DIAGNOSIS — Z21 HUMAN IMMUNODEFICIENCY VIRUS I INFECTION (H): Primary | ICD-10-CM

## 2019-12-18 DIAGNOSIS — E29.1 HYPOGONADISM IN MALE: ICD-10-CM

## 2019-12-18 DIAGNOSIS — Z23 NEED FOR INFLUENZA VACCINATION: ICD-10-CM

## 2019-12-18 DIAGNOSIS — Z79.899 ON STATIN THERAPY: ICD-10-CM

## 2019-12-18 PROCEDURE — G0463 HOSPITAL OUTPT CLINIC VISIT: HCPCS | Mod: 25,ZF

## 2019-12-18 PROCEDURE — 25000128 H RX IP 250 OP 636: Mod: ZF | Performed by: INTERNAL MEDICINE

## 2019-12-18 PROCEDURE — G0008 ADMIN INFLUENZA VIRUS VAC: HCPCS | Mod: ZF

## 2019-12-18 PROCEDURE — G0008 ADMIN INFLUENZA VIRUS VAC: HCPCS

## 2019-12-18 PROCEDURE — 90686 IIV4 VACC NO PRSV 0.5 ML IM: CPT | Mod: ZF | Performed by: INTERNAL MEDICINE

## 2019-12-18 RX ADMIN — INFLUENZA A VIRUS A/BRISBANE/02/2018 IVR-190 (H1N1) ANTIGEN (FORMALDEHYDE INACTIVATED), INFLUENZA A VIRUS A/KANSAS/14/2017 X-327 (H3N2) ANTIGEN (FORMALDEHYDE INACTIVATED), INFLUENZA B VIRUS B/PHUKET/3073/2013 ANTIGEN (FORMALDEHYDE INACTIVATED), AND INFLUENZA B VIRUS B/MARYLAND/15/2016 BX-69A ANTIGEN (FORMALDEHYDE INACTIVATED) 0.5 ML: 15; 15; 15; 15 INJECTION, SUSPENSION INTRAMUSCULAR at 18:32

## 2019-12-18 ASSESSMENT — PAIN SCALES - GENERAL: PAINLEVEL: NO PAIN (0)

## 2019-12-18 NOTE — NURSING NOTE
Chief Complaint   Patient presents with     RECHECK     B20     Blood pressure 134/85, pulse 83, weight 69.9 kg (154 lb 1.6 oz), SpO2 100 %.    Adiel Sheehan/SALLY  December 18, 2019 4:59 PM

## 2019-12-23 ENCOUNTER — TELEPHONE (OUTPATIENT)
Dept: PHARMACY | Facility: CLINIC | Age: 54
End: 2019-12-23

## 2019-12-23 NOTE — TELEPHONE ENCOUNTER
Attempted to contact the patient to for refill reminder call,  left message on voicemail    Last filled on: 11/26/19    Follow-up Date: 12/27/19 2nd attempt    Brunilda Norton CPhT  [facility]  [phone]

## 2019-12-26 DIAGNOSIS — E34.9 HYPOTESTOSTERONISM: ICD-10-CM

## 2019-12-30 NOTE — PROGRESS NOTES
"Division of Infectious Diseases and International Medicine  Department of Medicine        [facility] OUTPATIENT VISIT NOTE [place] Mail Code [address] [place] [address]  Office: [phone]  Fax:  [phone]     HISTORY OF PRESENT ILLNESS:    This 54 year old gentleman with asymptomatic HIV infection returns today to clinic for his semi-annual follow-up of ongoing antiretroviral therapy comprised of Combivir one tablet PO BID plus nevirapine 200 mg PO BID (since 5/03).  He has not missed a dose since his last appointment here on 6/18/19 and reports no drug side effects.  Even though his regimen is a BID dosed combination, he is not presently interested in switching to a one-pill-once-daily combination -- we discussed this option again today.  He always has more difficulty with depression in the winter, dark months, but says that he seems to be coping reasonably well so far this winter (on Serzone 100 mg TID) and he continues to follow-up with his longstanding psychiatrist, Dr. Johnston (at the [facility] in [place]).  He is exercising irregularly.  He remains on a decreased dose of fenofibrate 108 mg daily (plus ongoing pravastatin 10 mg PO daily) and is pleased that his most recent fasting serum triglycerides level last week on 12/13/19 was down to 96.  His blood pressures at home remain \"normal\"  (no numbers brought today) on continued amlodipine 10 mg daily (increased 5/17/17, started 5/20/15) plus lisinopril (20 mg PO BID since 1/15).  He remains on Synthroid 200 mcg / day (since 3/14) for chronic hypothyroidism and on topical testosterone, Axiron, one 30 mg pump daily (often only taking it every other day) for hypotestosteronism / erectile dysfunction, without side effects.  He otherwise feels well of late and lacks additional questions or health complaints today, including no fever, chills, night sweats, anorexia, unusual fatigue, EENT symptoms, cough, " Telephone Encounter by Cassandra Bowles RMA at 07/10/18 09:41 AM     Author:  Cassandra Bowles RMA Service:  (none) Author Type:  Certified Medical Assistant     Filed:  07/10/18 09:41 AM Encounter Date:  7/10/2018 Status:  Signed     :  Casasndra Bowles RMA (Certified Medical Assistant)            To[PR1.1T] Dr. Grace[PR1.1M]-  Please advise.  Thank you[PR1.1T]      Revision History        User Key Date/Time User Provider Type Action    > PR1.1 07/10/18 09:41 AM Cassandra Bowles RMA Certified Medical Assistant Sign    M - Manual, T - Template             dyspnea, chest pain, nausea, abdominal pain, diarrhea, rash, myalgias / arthralgias (despite the statin), genitourinary symptoms (beyond chronic stable nocturnal frequency and ED), headache, or other new neurological symptoms.    PAST MEDICAL HISTORY:    1. HIV, diagnosed 7/93, previously treated with AZT, 3TC and Crixivan. He was then on AZT plus efavirenz, but in 5/03 began Combivir/Sustiva and has remained virologically suppressed on this regimen since.  2. Stable chronic stage 1 kidney disease:  Nephrology Clinic evaluation (including negative MRI scan) unremarkable in 2009.  3. Hyperlipidemia previously requiring multiple medications, more hypertriglyceridemia than hypercholesterolemia -- now on fenofibrate plus Pravachol which was added 4/18/18.   4. Hyperthyroidism on levothyroxine.   5. History of difficult-to-treat anxiety and depression -- followed for a number of years by psychiatrist Dr. Abebe Johnston in [place].   6. Hypertension treated with Vasotec.   7. Acute appendicitis with rupture in the summer of 2007.  8. Cholecystectomy in the summer of 2007.   9. 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  Followed there annually.   10. 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  Previously received Sculptra.   11. History of a low testosterone level, most recently checked in 1/09 with a normal free testosterone of 14.0 and a total testosterone in the normal range of 482, managed somewhat unorthodoxly by a now-retired Urologist until 7/13.  Since then, has continued taking biweekly home exogenous testosterone IM injections and has tapered them only marginally from ~ weekly to ~ every other week (or a bit less frequent).  Total testosterone level on 5/14/14 was 2926.  12. Syphilis diagnosed 5/08, peak titer of 1:256, completed a  treatment course of IM penicillin times 3, last dose in 6/08.  13. Prior rectal warts, treated with Aldara cream.  14. Infected lower left cracked mandibular molar tooth, repaired 6/10.  15. Unintentional overdose of alcohol, hydrocodone, and clonazepam for which hospitalized in [place] in 10/12.  16. Mild intermittent controlled extensor limb psoriasis.  17. Sleep apnea, diagnosed elsewhere.    Past Surgical History:   Procedure Laterality Date     APPENDECTOMY OPEN  Summer 2007.    For acute appendicitis with rupture.     CHOLECYSTECTOMY  2007.     ALLERGIES:  PCN and iodine    CURRENT MEDICATIONS:  Current Outpatient Medications   Medication Sig Dispense Refill     amLODIPine (NORVASC) 10 MG tablet Take 1 tablet (10 mg) by mouth daily 90 tablet 3     aspirin 81 MG tablet Take 1 tablet by mouth daily.       buPROPion (WELLBUTRIN SR) 150 MG 12 hr tablet Take 1 tablet (150 mg) by mouth 3 times daily 90 tablet 0     enalapril (VASOTEC) 20 MG tablet Take 1 tablet (20 mg) by mouth 2 times daily 180 tablet 3     fenofibrate (LOFIBRA) 54 MG tablet Take 2 tablets (108 mg) by mouth daily 60 tablet 5     lamiVUDine-zidovudine (COMBIVIR) 150-300 MG per tablet Take 1 tablet by mouth 2 times daily 180 tablet 3     lamoTRIgine (LAMICTAL) 200 MG tablet        levothyroxine (SYNTHROID/LEVOTHROID) 100 MCG tablet TAKE TWO TABLETS BY MOUTH EVERY  tablet 3     multivitamin, therapeutic with minerals (MULTI-VITAMIN) TABS Take 1 tablet by mouth daily       nefazodone (SERZONE) 100 MG tablet 100 mg 3 times daily        nevirapine (VIRAMUNE) 200 MG tablet TAKE ONE TABLET BY MOUTH EVERY 12 HOURS 180 tablet 3     pravastatin (PRAVACHOL) 10 MG tablet Take 1 tablet (10 mg) by mouth daily 90 tablet 3     tadalafil (CIALIS) 20 MG tablet Take 1 tablet (20 mg) by mouth daily as needed 6 tablet 3     testosterone (AXIRON) 30 MG/ACT topical solution Place 1 pump (30 mg) onto the skin daily 90 mL 5     FAMILY HISTORY:  Family History    Problem Relation Age of Onset     Depression Father      Hyperlipidemia Father      Depression Other         Multiple family members.     Heart Disease Maternal Grandmother      Myocardial Infarction Maternal Grandfather         Sudden MI / death at age 51.     Hyperlipidemia Maternal Grandfather      Obesity Paternal Grandmother      Hyperlipidemia Other         Multiple family members (Parents, brother, sister, PGM)     SOCIAL HISTORY:  Lives alone in basement apartment in [place] spends much of his time at his parents home in [place] in the town he grew up in and where all his siblings still live.  Unemployed, on disability based on his depression diagnosis.  Sexually active, no single steady partner, mostly with other HIV seropositive men, but always discloses his status when with HIV seronegative partners and uses protection.  In the past, has visited friends in [place] for an extended stay.  Tobacco:  Long-standing, up to 2.5 ppd in the past.  Presently 1 ppd (but smokes only half of each cigarette).  Has quit for up to three weeks several times in the past; not interested in further attempts at reduction at present.  Has a treadmill in the basement of his parents home that he can use for indoor exercise during cold weather, although he does so infrequently.    REVIEW OF SYSTEMS:  As per HPI.  Complete ROS is otherwise negative.    PHYSICAL EXAMINATION:  General:  A personable, interactive, upbeat-appearing, WDWN 54-year-old gentleman in no discomfort.  Vital Signs:  /85   Pulse 83   Wt 69.9 kg (154 lb 1.6 oz)   SpO2 100%   BMI 22.76 kg/m   (weight is stable).  Skin:  No visible rash or skin lesion.  Head/Neck:  NCAT.  Old stable bilateral mild buccal facial lipoatrophy.  Scattered chronic keratoses.  External auditory canals are patent.  PERRL. EOMI.  Sclerae are anicteric.  Conjunctivae are pink.  Oropharynx has no erythema, exudate or lesion.  Dentition appears acceptable.   Neck is supple without lymphadenopathy or thyromegaly.  Chest:  Bilaterally clear to auscultation.  CV:  RRR, normal S1, S2 without gallop, murmur or rub.  Abdomen:  Bowel sounds present, soft, nontender, no organomegaly, old surgical scar present.  Back:  No low spine or CVA tenderness.  Extremities:  Distally warm, no edema.  Neurological:  Alert, oriented, memory / cognition / affect intact.  Gait is normal.    LABORATORY STUDIES (Reviewed with the patient during his appointment today):      TSH 12/13/2019 1.79  0.40 - 4.00 mU/L Final     IFC Specimen 12/13/2019 Blood   Final     CD3 Mature T 12/13/2019 89* 49 - 84 % Final     CD4 Frederick T 12/13/2019 36  28 - 63 % Final     CD8 Suppressor T 12/13/2019 56* 10 - 40 % Final     CD4:CD8 Ratio 12/13/2019 0.64* 1.40 - 2.60 Final     Absolute CD3 12/13/2019 3,092* 603 - 2,990 cells/uL Final     Absolute CD4 12/13/2019 1,251  441 - 2,156 cells/uL Final     Absolute CD8 12/13/2019 1,960* 125 - 1,312 cells/uL Final     HIV-1 RNA Quant Result 12/13/2019 HIV-1 RNA Not Detected  HIVND^HIV-1 RNA Not Detected [Copies]/mL Final    Comment: The BEV AmpliPrep/BEV TaqMan HIV-1 test is an FDA-approved in vitro   nucleic acid amplification test for the quantitation of HIV-1 RNA in human   plasma (EDTA plasma) using the BEV AmpliPrep instrument for automated viral   nucleic acid extraction and the BEV TaqMan Analyzer or BEV TaqMan for   automated Real Time PCR amplification and detection of the viral nucleic acid   target.  Titer results are reported in copies/ml. This assay is intended for use in   conjunction with clinical presentation and other laboratory markers of disease   prognosis and for use as an aid in assessing viral response to antiretroviral   treatment as measured by changes in plasma HIV-1 RNA levels. This test should   not be used as a donor screening test to confirm the presence of HIV-1   infection.       HIV RNA QT Log 12/13/2019 Not Calculated  <1.3  [Log_copies]/mL Final     WBC 12/13/2019 9.2  4.0 - 11.0 10e9/L Final     RBC Count 12/13/2019 3.66* 4.4 - 5.9 10e12/L Final     Hemoglobin 12/13/2019 13.6  13.3 - 17.7 g/dL Final     Hematocrit 12/13/2019 38.3* 40.0 - 53.0 % Final     MCV 12/13/2019 105* 78 - 100 fl Final     MCH 12/13/2019 37.2* 26.5 - 33.0 pg Final     MCHC 12/13/2019 35.5  31.5 - 36.5 g/dL Final     RDW 12/13/2019 13.4  10.0 - 15.0 % Final     Platelet Count 12/13/2019 349  150 - 450 10e9/L Final     Diff Method 12/13/2019 Automated Method   Final     % Neutrophils 12/13/2019 54.3  % Final     % Lymphocytes 12/13/2019 37.6  % Final     % Monocytes 12/13/2019 4.8  % Final     % Eosinophils 12/13/2019 2.1  % Final     % Basophils 12/13/2019 0.9  % Final     % Immature Granulocytes 12/13/2019 0.3  % Final     Nucleated RBCs 12/13/2019 0  0 /100 Final     Absolute Neutrophil 12/13/2019 5.0  1.6 - 8.3 10e9/L Final     Absolute Lymphocytes 12/13/2019 3.4  0.8 - 5.3 10e9/L Final     Absolute Monocytes 12/13/2019 0.4  0.0 - 1.3 10e9/L Final     Absolute Eosinophils 12/13/2019 0.2  0.0 - 0.7 10e9/L Final     Absolute Basophils 12/13/2019 0.1  0.0 - 0.2 10e9/L Final     Abs Immature Granulocytes 12/13/2019 0.0  0 - 0.4 10e9/L Final     Absolute Nucleated RBC 12/13/2019 0.0   Final     Sodium 12/13/2019 138  133 - 144 mmol/L Final     Potassium 12/13/2019 3.6  3.4 - 5.3 mmol/L Final     Chloride 12/13/2019 105  94 - 109 mmol/L Final     Carbon Dioxide 12/13/2019 27  20 - 32 mmol/L Final     Anion Gap 12/13/2019 6  3 - 14 mmol/L Final     Glucose 12/13/2019 103* 70 - 99 mg/dL Final     Urea Nitrogen 12/13/2019 12  7 - 30 mg/dL Final     Creatinine 12/13/2019 1.05  0.66 - 1.25 mg/dL Final     GFR Estimate 12/13/2019 81  >60 mL/min/[1.73_m2] Final    Comment: Non  GFR Calc  Starting 12/18/2018, serum creatinine based estimated GFR (eGFR) will be   calculated using the Chronic Kidney Disease Epidemiology Collaboration   (CKD-EPI) equation.        GFR Estimate If Black 12/13/2019 >90  >60 mL/min/[1.73_m2] Final    Comment:  GFR Calc  Starting 12/18/2018, serum creatinine based estimated GFR (eGFR) will be   calculated using the Chronic Kidney Disease Epidemiology Collaboration   (CKD-EPI) equation.       Calcium 12/13/2019 9.5  8.5 - 10.1 mg/dL Final     Bilirubin Total 12/13/2019 0.4  0.2 - 1.3 mg/dL Final     Albumin 12/13/2019 4.4  3.4 - 5.0 g/dL Final     Protein Total 12/13/2019 7.6  6.8 - 8.8 g/dL Final     Alkaline Phosphatase 12/13/2019 64  40 - 150 U/L Final     ALT 12/13/2019 22  0 - 70 U/L Final     AST 12/13/2019 8  0 - 45 U/L Final     Cholesterol 12/13/2019 189  <200 mg/dL Final     Triglycerides 12/13/2019 96  <150 mg/dL Final     HDL Cholesterol 12/13/2019 76  >39 mg/dL Final     LDL Cholesterol Calculated 12/13/2019 95  <100 mg/dL Final    Desirable:       <100 mg/dl     Non HDL Cholesterol 12/13/2019 114  <130 mg/dL Final     ASSESSMENT/PLAN:    1. Well-controlled HIV infection:  On long-standing Combivir / nevirapine antiretroviral therapy his absolute CD4+ cell count is normal and his HIV plasma viral load remains undetectable, with tight dosing adherence and excellent medication tolerance.  He is again not interested in switching to an alternative regimen (such as Triumeq or Biktarvy) today.  He will remain on Viramune plus Combivir with a return to [facility] in six months (with pre-labs before that visit, ordered today), or as needed before then.  2. Well-controlled hyperlipidemia:  With additional of pravastatin 10 mg PO daily in early 2018 (which he tolerates well) and perhaps due to other factors (different psychiatric medications?, decreased testosterone dose), his most recent triglycerides on 12/13/19 were improved at 96 even though his fenofibrate dose was reduced from 162 mg daily to 108 mg daily at his most recent past 6/18/19 clinic appointment.  His total cholesterol remained improved at 189 on  12/13/19 (with an LDL of 95 and HDL of 76).  He stay on the present fenofibrate dose of 108 mg PO daily, but consider decreasing it further to only 54 mg (one tablet) daily a month or several before his next blood draw, if he wishes, with a recheck of his lipids profile at that draw.  3. Stable, unchanged, old cheeks lipoatrophy:  He previously received Sculptra injections to the cheeks and is considering undergoing such injections again.  4. Controlled essential hypertension:  On ongoing amlodipine 10 mg PO daily (increased 5/17/17, started 5/20/15) and enalapril 20 mg PO BID (dose increased 1/27/15) his blood pressure is well-controlled, both by office and (per his indirect report) home measurements.  I again encouraged aerobic exercise.  5. Testosterone replacement therapy / previous erectile dysfunction:  His most recent total testosterone level on 10/12/18 was normal at 522 on Axiron  transcutaneous testosterone topical replacement therapy at two (30 mg) pumps (= 60 mg) daily (since late 1/15).  He is now using one pump daily since mid-2019, however (although still skips some days).  We will repeat the total testosterone at his next blood draw.    6. BPH / nocturnal urinary frequency:  He had palpable prostatic enlargement on 10/18/17 digital rectal exam with a normal PSA screen.  His nocturia is unchanged.  He is not been interested in trying Flomax.  7. Well-treated hypothyroidism:  He feels well and his TSH was again normal on 12/13/19 at 1.79, so he continues taking the same levothyroxine dose of 200 mcg / day (which was lowered in 3/14).  8. Chronic, intractable depression / anxiety / insomnia / agoraphobia:  He follows with Dr. Johnston, his psychiatrist in the [facility] in [place], who he sees every two months.  At present, he seems to be tolerating winter reasonably well on nefazodone (Serzone) 100 mg PO TID, which he seems to tolerate well.  We previously determined there are no Serzone  drug-drug interaction issues with his antiretrovirals and no major concerns with his other medications.  9. Chronic stage 2 renal insufficiency:  He has a history of mild chronic kidney insufficiency with past creatinines previously in the 1.2 - 1.5 range since 2005, with two most recently stable creatinines of 10.5 - 1.07 in 2019.  10. History of prior mild mid-winter vitamin D deficiency:  He self-takes OTC vitamin D supplements most white, although whether he needs that is questionable, because his 10/12/18 vitamin D level was adequate at 20.  11. Mild psoriasis, presently in remission:  Well-controlled with topical Clobex prn.  Not apparent today.  12. Health Care Maintenance:  Influenza vaccine given today.  Menactra vaccine given 11/18/15.  Tdap was boosted 1/21/15.  Prevnar 13 given 10/16/13; Pneumovax given 1/21/15.  HAV / HBV immunized / seroimmune.  HCV negative 6/1/09.  Negative rectal Pap done by Dr. Sanchez 6/28/10.  Repeat RPR was negative on 11/12/15; will repeat with next blood draw (had a positive antitreponemal antibody on 8/27/09).  Quantiferon Gold was negative 11/12/15.  A screening hemoglobin A1C (because he has had several slightly elevated random glucoses over the past several years) was normal at 5.6% on 6/14/19.  Sees a dentist annually.  Sees a sleep apnea specialist for Cpap.  Saw an ophthalmologist in early 2012.  Had a  non-[facility] dermatologist appointment for a complete skin check in early 2019 and was told he had only chronic lentiga without any concerning lesions.  Underwent a routine screening colonoscopy at [facility] in [place], [place], in 1/19 which was normal (without polyps, per the patient) except for diverticulosis  -- encourage to intake more fiber.

## 2020-01-03 RX ORDER — TESTOSTERONE 30 MG/1.5ML
1 SOLUTION TOPICAL DAILY
Qty: 90 ML | Refills: 5 | Status: SHIPPED | OUTPATIENT
Start: 2020-01-03 | End: 2020-06-17

## 2020-01-03 NOTE — TELEPHONE ENCOUNTER
Writer walked down hand written testosterone script by Dr. Latham down to [facility] [facility].    Bea Lal RN

## 2020-01-03 NOTE — TELEPHONE ENCOUNTER
[facility] Call Center    Phone Message    May a detailed message be left on voicemail: yes    Reason for Call: Medication Refill Request    Has the patient contacted the pharmacy for the refill? Yes   Name of medication being requested: testosterone (AXIRON) 30 MG/ACT topical solution  Provider who prescribed the medication:   Pharmacy:  speciality  Date medication is needed: asap         Action Taken: Message routed to:  [facility] I.D

## 2020-01-13 ENCOUNTER — TRANSFERRED RECORDS (OUTPATIENT)
Dept: HEALTH INFORMATION MANAGEMENT | Facility: CLINIC | Age: 55
End: 2020-01-13

## 2020-01-14 ENCOUNTER — MEDICAL CORRESPONDENCE (OUTPATIENT)
Dept: HEALTH INFORMATION MANAGEMENT | Facility: CLINIC | Age: 55
End: 2020-01-14

## 2020-01-15 ENCOUNTER — TRANSCRIBE ORDERS (OUTPATIENT)
Dept: OTHER | Age: 55
End: 2020-01-15

## 2020-01-15 DIAGNOSIS — G47.33 OSA (OBSTRUCTIVE SLEEP APNEA): Primary | ICD-10-CM

## 2020-01-20 ENCOUNTER — TELEPHONE (OUTPATIENT)
Dept: OTOLARYNGOLOGY | Facility: CLINIC | Age: 55
End: 2020-01-20

## 2020-01-20 NOTE — TELEPHONE ENCOUNTER
Only one page received from referrals team and sent to HIM.  Copy placed in clinic coordinator rightfax folder.

## 2020-01-20 NOTE — TELEPHONE ENCOUNTER
[facility] Call Center    Phone Message    May a detailed message be left on voicemail: yes    Reason for Call: Patient is being referred to Dr. Powell from Dr. Edgar Lima at [facility] for an inspire consult-Dx: Obstructive Sleep Apnea.  Records received have been faxed to clinic-order in patient's chart  Please call patient with appointment options    Action Taken: Message routed to:  [facility] ([facility] ENT

## 2020-01-20 NOTE — TELEPHONE ENCOUNTER
Called patient to schedule:    Appointment Type: [facility] NEW SLEEP  Provider: Dr. Powell  Appointment Notes: Inspire Consult, ref by Dr. Lima ([facility]), AHI 19, sleep study 1/13/20, recs sent to scan    Left a VM with scheduling instructions and the ENT call center number for pt to call back and schedule.

## 2020-01-23 ENCOUNTER — TELEPHONE (OUTPATIENT)
Dept: PHARMACY | Facility: CLINIC | Age: 55
End: 2020-01-23

## 2020-01-23 NOTE — TELEPHONE ENCOUNTER
FUTURE VISIT INFORMATION      FUTURE VISIT INFORMATION:    Date: 2/20/20    Time: 1 PM    Location: [facility]  REFERRAL INFORMATION:    Referring provider:  Dr. Edgar Lima    Referring providers clinic:  [facility]    Reason for visit/diagnosis: Consult for Inspire    RECORDS REQUESTED FROM:       Clinic name Comments Records Status Imaging Status   [facility] 12/18/19 - OV with Dr. Lima  8/17/16 - OV with PADMINI Hilliard  9/19/13 to 3/21/14 - OV with Dr. Lima 2/11 Sent to Scan    [facility] - Procedures 1/13/20 - Sleep Study  10/14/13 - Sleep Study 2/11 Sent to Scan                              * 1/23/20 3:04 PM Faxed req to [facility] for additional recs from 2016 and sleep study from 2013 - [place]  * 2/6/20 7:36 AM Faxed 2nd Urgent req to [facility] for 2013 and 2016 sleep studies - [place]  * 2/11/20 10:28 AM Called [facility] as they didn't fax all the records and needed them to send it again with all the records - [place]  * 2/11/20 11:15 AM Received fax from [facility] and sent to HIM to be scanned into the chart, no Sleep study was done in 2016 - [place]

## 2020-01-23 NOTE — TELEPHONE ENCOUNTER
Attempted to contact the patient to for refill reminder call,  left message on voicemail    Last filled on: 12/27/19    Follow-up Date: 01/29/20 2nd attempt    Brunilda Norton CPhT  [facility]  [phone]

## 2020-01-24 DIAGNOSIS — E78.2 MIXED HYPERLIPIDEMIA: ICD-10-CM

## 2020-01-24 RX ORDER — FENOFIBRATE 54 MG/1
TABLET ORAL
Qty: 180 TABLET | Refills: 3 | Status: SHIPPED | OUTPATIENT
Start: 2020-01-24 | End: 2020-11-30

## 2020-01-24 NOTE — TELEPHONE ENCOUNTER
Pt called back to order refills.   Will Fed Ex prescriptions address has been verified.       Last Filled on: 12/27/19   Follow-up Date: 02/25/20    Brunilda Norton CPhT  [facility]  [phone]

## 2020-01-27 ENCOUNTER — TELEPHONE (OUTPATIENT)
Dept: INFECTIOUS DISEASES | Facility: CLINIC | Age: 55
End: 2020-01-27

## 2020-01-27 NOTE — TELEPHONE ENCOUNTER
Spoke with pharmacist form [facility] and gave verbal order for testosterone 30 mg/act topical solution.     Bea Lal RN

## 2020-01-27 NOTE — TELEPHONE ENCOUNTER
[facility] Call Center    Phone Message    May a detailed message be left on voicemail: yes    Reason for Call: Medication Question or concern regarding medication   Prescription Clarification  Name of Medication: testosterone (AXIRON) 30 MG/ACT topical solution  Prescribing Provider: Noa   Pharmacy: [facility] - [place], [place] - [address]   What on the order needs clarification? Pharmacy is requesting a verbal.  They state this medication has not been filled there before.  Please follow up.           Action Taken: Message routed to:  [facility] ID

## 2020-02-20 ENCOUNTER — PRE VISIT (OUTPATIENT)
Dept: OTOLARYNGOLOGY | Facility: CLINIC | Age: 55
End: 2020-02-20

## 2020-02-20 ENCOUNTER — OFFICE VISIT (OUTPATIENT)
Dept: OTOLARYNGOLOGY | Facility: CLINIC | Age: 55
End: 2020-02-20
Attending: PSYCHIATRY & NEUROLOGY
Payer: COMMERCIAL

## 2020-02-20 VITALS — WEIGHT: 159 LBS | BODY MASS INDEX: 23.55 KG/M2 | HEIGHT: 69 IN

## 2020-02-20 DIAGNOSIS — G47.33 OSA (OBSTRUCTIVE SLEEP APNEA): Primary | ICD-10-CM

## 2020-02-20 RX ORDER — OXYMETAZOLINE HYDROCHLORIDE 0.05 G/100ML
2 SPRAY NASAL ONCE
Status: CANCELLED | OUTPATIENT
Start: 2020-02-20 | End: 2020-02-20

## 2020-02-20 RX ORDER — GLYCOPYRROLATE 0.2 MG/ML
0.2 INJECTION, SOLUTION INTRAMUSCULAR; INTRAVENOUS ONCE
Status: CANCELLED | OUTPATIENT
Start: 2020-02-20 | End: 2020-02-20

## 2020-02-20 ASSESSMENT — PAIN SCALES - GENERAL: PAINLEVEL: NO PAIN (0)

## 2020-02-20 ASSESSMENT — PATIENT HEALTH QUESTIONNAIRE - PHQ9: SUM OF ALL RESPONSES TO PHQ QUESTIONS 1-9: 23

## 2020-02-20 ASSESSMENT — MIFFLIN-ST. JEOR: SCORE: 1561.6

## 2020-02-20 NOTE — NURSING NOTE
"Chief Complaint   Patient presents with     Consult     TATY, discuss Inspire         Height 1.753 m (5' 9\"), weight 72.1 kg (159 lb).    Depression Response    Patient completed the PHQ-9 assessment for depression and scored >9? Yes  Question 9 on the PHQ-9 was positive for suicidality? No    I personally notified the following: clinic nurse        Edwina Eagle, EMT    " I performed the initial face to face bedside interview with this patient regarding history of present illness, review of symptoms and past medical, social and family history.  I completed an independent physical examination.  I was the initial provider who evaluated this patient.  The history, review of symptoms and examination was documented by the scribe in my presence and I attest to the accuracy of the documentation.  I have signed out the follow up of any pending tests (i.e. labs, radiological studies) to the PA.  I have discussed the patient’s plan of care and disposition with the PA.

## 2020-02-20 NOTE — PROGRESS NOTES
SLEEP SURGERY CONSULTATION    Patient: Dennys Bee  : 1965  CHIEF COMPLAINT: TATY    IDENTIFICATION: Dr. Lima consulted Dr. Powell for surgical evaluation and possible treatment of obstructive sleep apnea syndrome for Dennys Bee.    HPI:  Dennys Bee is a 54 year old year old male who has Obstructive Sleep Apnea. Patient had a sleep study performed initially in  at [facility].  Overall AHI was 9.2.  Patient did try CPAP after this sleep study.  He tried several different styles of mask including nasal mask, full facemask.  He worked with his sleep medicine physician for over many months to try to improve his tolerance to CPAP.  Ultimately they decided that he was likely not going to be successful with CPAP so they transitioned him to an oral appliance.  Patient did see a dentist to have a mandibular advancement device made.  He also found this to be unhelpful as he could not tolerate it.  It did cause jaw pain.  Eventually the patient decided for observation.  However the last several months he feels like his sleep has worsened.  He had a repeat sleep study performed on 2020.  Overall AHI is 18.2.  Lowest oxygen saturation 79%.  Supine AHI was 41.  Nonsupine 3.5.  A 55 obstructive apneas, 0 central apneas, 85 hypopneas.  Patient does have a history of mental health issues including depression anxiety.  He does work with a therapist.  He does have some issues with sleeping and insomnia.      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  Negative Pap smears since .     Chronic kidney disease      Depression, anxiety      Human immunodeficiency virus (HIV) disease (H) Diagnosed .    First treated with AZT / 3TC / Crixivan; then on AZT / efavirenz;on Combivir/Sustiva since .     Hyperlipidemia      Hypertension      Hypotestosteronism     On weekly replacement therapy through his urologist.      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     Syphilis, latent 5/08.    Treated with three weeks of benzathin penicillin.       PAST SURGICAL HISTORY:  Past Surgical History:   Procedure Laterality Date     APPENDECTOMY OPEN  Summer 2007.    For acute appendicitis with rupture.     CHOLECYSTECTOMY  2007.       MEDICATIONS:  Current Outpatient Medications   Medication Sig Dispense Refill     amLODIPine (NORVASC) 10 MG tablet Take 1 tablet (10 mg) by mouth daily 90 tablet 3     aspirin 81 MG tablet Take 1 tablet by mouth daily.       buPROPion (WELLBUTRIN SR) 150 MG 12 hr tablet Take 1 tablet (150 mg) by mouth 3 times daily 90 tablet 0     enalapril (VASOTEC) 20 MG tablet Take 1 tablet (20 mg) by mouth 2 times daily 180 tablet 3     fenofibrate (LOFIBRA) 54 MG tablet TAKE TWO TABLETS BY MOUTH ONCE DAILY 180 tablet 3     lamiVUDine-zidovudine (COMBIVIR) 150-300 MG per tablet Take 1 tablet by mouth 2 times daily 180 tablet 3     lamoTRIgine (LAMICTAL) 200 MG tablet        levothyroxine (SYNTHROID/LEVOTHROID) 100 MCG tablet TAKE TWO TABLETS BY MOUTH EVERY  tablet 3     multivitamin, therapeutic with minerals (MULTI-VITAMIN) TABS Take 1 tablet by mouth daily       nefazodone (SERZONE) 100 MG tablet 100 mg 3 times daily        nevirapine (VIRAMUNE) 200 MG tablet TAKE ONE TABLET BY MOUTH EVERY 12 HOURS 180 tablet 3     pravastatin (PRAVACHOL) 10 MG tablet Take 1 tablet (10 mg) by mouth daily 90 tablet 3     testosterone (AXIRON) 30 MG/ACT topical solution Place 1 pump (30 mg) onto the skin daily 90 mL 5       ALLERGIES:  Allergies   Allergen Reactions     Dye [Contrast Dye]      Iodine I 131 Tositumomab        SOCIAL HISTORY:  Social History     Socioeconomic History     Marital status: Single     Spouse name: Not on file     Number of children: Not on file     Years of education: Not on file     Highest education level: Not on file    Occupational History     Not on file   Social Needs     Financial resource strain: Not on file     Food insecurity:     Worry: Not on file     Inability: Not on file     Transportation needs:     Medical: Not on file     Non-medical: Not on file   Tobacco Use     Smoking status: Current Every Day Smoker     Packs/day: 1.00     Types: Cigarettes     Smokeless tobacco: Never Used   Substance and Sexual Activity     Alcohol use: Not on file     Drug use: Not on file     Sexual activity: Yes     Partners: Male     Birth control/protection: Condom   Lifestyle     Physical activity:     Days per week: Not on file     Minutes per session: Not on file     Stress: Not on file   Relationships     Social connections:     Talks on phone: Not on file     Gets together: Not on file     Attends Christianity service: Not on file     Active member of club or organization: Not on file     Attends meetings of clubs or organizations: Not on file     Relationship status: Not on file     Intimate partner violence:     Fear of current or ex partner: Not on file     Emotionally abused: Not on file     Physically abused: Not on file     Forced sexual activity: Not on file   Other Topics Concern     Parent/sibling w/ CABG, MI or angioplasty before 65F 55M? Not Asked   Social History Narrative    Lives alone in basement apartment in [place].  Unemployed, on disability based on his depression diagnosis.  Frequently visits his parents cabin home in [facility] [place] [facility] for extended stays.       FAMILY HISTORY:  Family History   Problem Relation Age of Onset     Depression Father      Hyperlipidemia Father      Depression Other         Multiple family members.     Heart Disease Maternal Grandmother      Myocardial Infarction Maternal Grandfather         Sudden MI / death at age 51.     Hyperlipidemia Maternal Grandfather      Obesity Paternal Grandmother      Hyperlipidemia Other         Multiple family members (Parents, brother, sister, PGM)  "      REVIEW OF SYSTEMS:   ENT ROS 2/12/2020   Constitutional Problems with sleep   Psychology Frequently feeling depressed or sad, Frequently feeling anxious   Ears, Nose, Throat Nasal congestion or drainage   Allergy/Immunology Allergies or hay fever         PHYSICAL EXAM  Ht 1.753 m (5' 9\")   Wt 72.1 kg (159 lb)   BMI 23.48 kg/m      Constitutional: healthy, alert and no distress      ASSESSMENT:  1.  Moderate obstructive sleep apnea,     PLAN:  1.  I discussed with the patient held upper airway stimulation therapy works. We reviewed expected outcomes as well as MRI incompatibility restrictions at this time.  We discussed what is involved in the surgery as well as as expected recovery.  Discussed with the patient the risk of nonresponse rate as well as potential discomfort from the device itself while stimulating the tongue. We then reviewed the selection criteria.    We would next proceed with a drug-induced sleep endoscopy determine anatomy appropriateness.    Patient mentioned that he does not have either family or friend that could take him back home after the procedure.  In this case I need to check with her preanesthesia team to see if there are any programs that would be able to assist the patient in terms of being a responsible party to a patient after a anesthetic.  At this time I would hold off on scheduling him until we have a better understanding of this.    I spent 35 minutes face-to-face with Dennys Bee during today's office visit, of which more than 50% was spent on counseling and coordination of care, which included discussion of pathophysiology of patient's obstructive sleep apnea, treatment options, risks and benefits of each option.                      "

## 2020-02-20 NOTE — NURSING NOTE
I provided patient the dianna-op worksheet  ENT Adult Default Surgery Request.   I encouraged patient to review the check list and highlighted the following points:  1) Complete History and Physical within 30 days of surgery, either with PAC clinic or family clinic.   If completed outside of  Physicians, please have provider send all of your results to before the surgery.  Please schedule a history and physical with your primary care provider.    2) Same-day surgery, must arrange for an adult to take you home and stay with you for the first 24 hours after surgery.  -Patient reports he Ubers to appointments and does not have a person to take him home and be with him for the first 24 hours.  -He comes in town only for a couple of weeks and then travels away.  -He wondered if the procedure could be completed closer to his parents in [place]?  But Dr. Powell is unsure and states closest would be [employer] which is not approprite.  -He shares his dad is dying of lung cancer? And his mother is the primary care giver.  -He states one person could possibly assist with picking him up but it would depend on the person's work schedule and would have to pay that person?    I discuss concerns with Dr. Powell and instructed to encourage patient to explore a .  I contacted Dari to discuss options and policies.  I was unable to reach Dari.  Will send message asking VIRGINIE Venegas to follow up and get back to patient.  I provided patient Kirstie and surgery scheduler's contact information.  He was in agreement with plan.    3) Stop drinking alcohol at least 24 hours before surgery.  4) Quit or at least cut down smoking as it higher your risks for infection after surgery. No chewing tobacco for at least 8 hours before surgery.      Please following surgeon s instructions as if you don t, your surgery could be canceled.

## 2020-02-20 NOTE — PATIENT INSTRUCTIONS
I provided patient the dianna-op worksheet  ENT Adult Default Surgery Request.   I encouraged patient to review the check list and highlighted the following points:  1) Complete History and Physical within 30 days of surgery, either with PAC clinic or family clinic.   If completed outside of  Physicians, please have provider send all of your results to before the surgery.  Please schedule a history and physical with your primary care provider.    2) Same-day surgery, must arrange for an adult to take you home and stay with you for the first 24 hours after surgery.  3) Stop drinking alcohol at least 24 hours before surgery.  4) Quit or at least cut down smoking as it higher your risks for infection after surgery. No chewing tobacco for at least 8 hours before surgery.      Please following surgeon s instructions as if you don t, your surgery could be canceled.

## 2020-02-25 ENCOUNTER — TELEPHONE (OUTPATIENT)
Dept: PHARMACY | Facility: CLINIC | Age: 55
End: 2020-02-25

## 2020-02-25 NOTE — TELEPHONE ENCOUNTER
Attempted to contact the patient to for refill reminder call,  no ability to leave a message, will mail letter need updated contact information    Last filled on: 01/27/20    Follow-up Date: 03/02/20  2nd attempt    Brunilda Norton [facility]  [phone]

## 2020-02-26 ENCOUNTER — PATIENT OUTREACH (OUTPATIENT)
Dept: OTOLARYNGOLOGY | Facility: CLINIC | Age: 55
End: 2020-02-26

## 2020-02-26 NOTE — PROGRESS NOTES
Previously noted that the patient currently does not have a plan for a  and 24 hour caregiver following recommended DISE procedure. RN calling patient to discuss this. Unable to leave a voicemail due to voicemail box being full.     Kirstie Day RN

## 2020-03-02 NOTE — TELEPHONE ENCOUNTER
Pt called back to order refills.   Will Fed Ex prescriptions address has been verified.     1 month of on time refill.    Last Filled on: 01/29/20  Follow-up Date: 04/01/20    Brunilda Norton CPhT  [facility]  [phone]

## 2020-03-10 ENCOUNTER — ALLIED HEALTH/NURSE VISIT (OUTPATIENT)
Dept: PHARMACY | Facility: CLINIC | Age: 55
End: 2020-03-10
Payer: MEDICAID

## 2020-03-10 DIAGNOSIS — Z21 HUMAN IMMUNODEFICIENCY VIRUS I INFECTION (H): Primary | ICD-10-CM

## 2020-03-10 PROCEDURE — 99207 ZZC NO CHARGE LOS: CPT | Performed by: PHARMACIST

## 2020-03-10 NOTE — PROGRESS NOTES
Clinical Pharmacy Consult:                                                    Dennys Bee is a 54 year old male calling for a clinical pharmacist consult.      Reason for Consult: Medication question.    Discussion: Dennys reports he has been on the same HIV regimen (Combivir, once tablet twice daily and Nevirapine, once tablet twice daily) for many, many years. Reports he has been talking to Noa Ocasio over the years about switching regimens, but has not as of yet. Reports he is interested in being switched to a one pill once daily regimen if appropriate and he would like to learn more about these medications. Patient reports he will be seeing Noa Ocasio in June and will discuss with him at that time.    Plan:  1. Reviewed common one pill, once daily regiments (Biktarvy, Odfesey, Triumeq) dosing, possible side effects.      Pt is asked to call with any questions/concerns.

## 2020-03-31 ENCOUNTER — TELEPHONE (OUTPATIENT)
Dept: PHARMACY | Facility: CLINIC | Age: 55
End: 2020-03-31

## 2020-03-31 NOTE — TELEPHONE ENCOUNTER
Attempted to contact the patient to for refill reminder call,  left message on voicemail    Last filled on: 03/02/20    Follow-up Date: 04/06/20  2nd attempt    Brunilda Norton CPhT  [facility]  [phone]

## 2020-04-30 ENCOUNTER — TELEPHONE (OUTPATIENT)
Dept: PHARMACY | Facility: CLINIC | Age: 55
End: 2020-04-30

## 2020-04-30 NOTE — TELEPHONE ENCOUNTER
Called patient for refill reminder.    Will mail prescriptions address has been verified. Mailed to his [place] address          2 month of on time refill.    Last Filled on:03/31/2020   Follow-up Date: 05/25/2020    Natan Vila  -----------------------------------------------   [email or web address]  Pharmacy Technician  [facility]: [phone]

## 2020-05-05 DIAGNOSIS — Z21 ASYMPTOMATIC HUMAN IMMUNODEFICIENCY VIRUS (HIV) INFECTION STATUS (H): ICD-10-CM

## 2020-05-05 RX ORDER — LAMIVUDINE AND ZIDOVUDINE 150; 300 MG/1; MG/1
1 TABLET, FILM COATED ORAL 2 TIMES DAILY
Qty: 180 TABLET | Refills: 0 | Status: SHIPPED | OUTPATIENT
Start: 2020-05-05 | End: 2020-06-17 | Stop reason: ALTCHOICE

## 2020-05-05 RX ORDER — NEVIRAPINE 200 MG/1
TABLET ORAL
Qty: 180 TABLET | Refills: 0 | Status: SHIPPED | OUTPATIENT
Start: 2020-05-05 | End: 2020-06-17 | Stop reason: ALTCHOICE

## 2020-05-05 NOTE — TELEPHONE ENCOUNTER
Per Dr. Latham's documentation on 12/18/2019, patient should remain on Viramune plus Combivir. New prescriptions ordered. Patient is scheduled for follow up with Dr. Latham on 06/17/2020.    Bea Lal RN

## 2020-05-20 ENCOUNTER — DOCUMENTATION ONLY (OUTPATIENT)
Dept: CARE COORDINATION | Facility: CLINIC | Age: 55
End: 2020-05-20

## 2020-05-27 ENCOUNTER — TELEPHONE (OUTPATIENT)
Dept: PHARMACY | Facility: CLINIC | Age: 55
End: 2020-05-27

## 2020-05-27 NOTE — TELEPHONE ENCOUNTER
Attempted to contact the patient to for refill reminder call,  left message on voicemail    Last filled on: 04/30/2020    Follow-up Date: 06/02/2020    Natan Vila  -----------------------------------------------   [email or web address]  Pharmacy Technician  [facility]: [phone]

## 2020-06-05 DIAGNOSIS — E03.9 ACQUIRED HYPOTHYROIDISM: ICD-10-CM

## 2020-06-05 RX ORDER — LEVOTHYROXINE SODIUM 100 UG/1
TABLET ORAL
Qty: 180 TABLET | Refills: 0 | Status: SHIPPED | OUTPATIENT
Start: 2020-06-05 | End: 2020-06-17

## 2020-06-05 NOTE — TELEPHONE ENCOUNTER
Per Dr. Latham's documentation on 12/18/2019, patient should continue levothyroxine dose of 200 mcg/day. Patient scheduled for follow up appointment with Dr. aLtham on 06/17/.2020. Levothyroxine reordered.    Bea Lal RN

## 2020-06-12 DIAGNOSIS — Z79.899 ON STATIN THERAPY: ICD-10-CM

## 2020-06-12 DIAGNOSIS — Z21 HUMAN IMMUNODEFICIENCY VIRUS I INFECTION (H): ICD-10-CM

## 2020-06-12 DIAGNOSIS — E78.5 HYPERLIPIDEMIA, UNSPECIFIED HYPERLIPIDEMIA TYPE: ICD-10-CM

## 2020-06-12 DIAGNOSIS — E29.1 HYPOGONADISM IN MALE: ICD-10-CM

## 2020-06-12 LAB
ALBUMIN SERPL-MCNC: 4.2 G/DL (ref 3.4–5)
ALP SERPL-CCNC: 54 U/L (ref 40–150)
ALT SERPL W P-5'-P-CCNC: 26 U/L (ref 0–70)
ANION GAP SERPL CALCULATED.3IONS-SCNC: 6 MMOL/L (ref 3–14)
AST SERPL W P-5'-P-CCNC: 13 U/L (ref 0–45)
BASOPHILS # BLD AUTO: 0.1 10E9/L (ref 0–0.2)
BASOPHILS NFR BLD AUTO: 0.8 %
BILIRUB SERPL-MCNC: 0.4 MG/DL (ref 0.2–1.3)
BUN SERPL-MCNC: 13 MG/DL (ref 7–30)
CALCIUM SERPL-MCNC: 9.1 MG/DL (ref 8.5–10.1)
CD3 CELLS # BLD: 2911 CELLS/UL (ref 603–2990)
CD3 CELLS NFR BLD: 89 % (ref 49–84)
CD3+CD4+ CELLS # BLD: 1169 CELLS/UL (ref 441–2156)
CD3+CD4+ CELLS NFR BLD: 36 % (ref 28–63)
CD3+CD4+ CELLS/CD3+CD8+ CLL BLD: 0.62 % (ref 1.4–2.6)
CD3+CD8+ CELLS # BLD: 1884 CELLS/UL (ref 125–1312)
CD3+CD8+ CELLS NFR BLD: 58 % (ref 10–40)
CHLORIDE SERPL-SCNC: 108 MMOL/L (ref 94–109)
CHOLEST SERPL-MCNC: 184 MG/DL
CK SERPL-CCNC: 210 U/L (ref 30–300)
CO2 SERPL-SCNC: 25 MMOL/L (ref 20–32)
CREAT SERPL-MCNC: 1 MG/DL (ref 0.66–1.25)
DIFFERENTIAL METHOD BLD: ABNORMAL
EOSINOPHIL # BLD AUTO: 0.2 10E9/L (ref 0–0.7)
EOSINOPHIL NFR BLD AUTO: 2.5 %
ERYTHROCYTE [DISTWIDTH] IN BLOOD BY AUTOMATED COUNT: 12.9 % (ref 10–15)
GFR SERPL CREATININE-BSD FRML MDRD: 86 ML/MIN/{1.73_M2}
GLUCOSE SERPL-MCNC: 98 MG/DL (ref 70–99)
HCT VFR BLD AUTO: 37.4 % (ref 40–53)
HDLC SERPL-MCNC: 51 MG/DL
HGB BLD-MCNC: 13.3 G/DL (ref 13.3–17.7)
IFC SPECIMEN: ABNORMAL
IMM GRANULOCYTES # BLD: 0 10E9/L (ref 0–0.4)
IMM GRANULOCYTES NFR BLD: 0.3 %
LDLC SERPL CALC-MCNC: 90 MG/DL
LIPASE SERPL-CCNC: 91 U/L (ref 73–393)
LYMPHOCYTES # BLD AUTO: 2.9 10E9/L (ref 0.8–5.3)
LYMPHOCYTES NFR BLD AUTO: 40.4 %
MCH RBC QN AUTO: 37.3 PG (ref 26.5–33)
MCHC RBC AUTO-ENTMCNC: 35.6 G/DL (ref 31.5–36.5)
MCV RBC AUTO: 105 FL (ref 78–100)
MONOCYTES # BLD AUTO: 0.4 10E9/L (ref 0–1.3)
MONOCYTES NFR BLD AUTO: 5.6 %
NEUTROPHILS # BLD AUTO: 3.6 10E9/L (ref 1.6–8.3)
NEUTROPHILS NFR BLD AUTO: 50.4 %
NONHDLC SERPL-MCNC: 133 MG/DL
NRBC # BLD AUTO: 0 10*3/UL
NRBC BLD AUTO-RTO: 0 /100
PLATELET # BLD AUTO: 322 10E9/L (ref 150–450)
POTASSIUM SERPL-SCNC: 3.8 MMOL/L (ref 3.4–5.3)
PROT SERPL-MCNC: 7.4 G/DL (ref 6.8–8.8)
RBC # BLD AUTO: 3.57 10E12/L (ref 4.4–5.9)
SODIUM SERPL-SCNC: 139 MMOL/L (ref 133–144)
TRIGL SERPL-MCNC: 216 MG/DL
WBC # BLD AUTO: 7.1 10E9/L (ref 4–11)

## 2020-06-12 PROCEDURE — 86360 T CELL ABSOLUTE COUNT/RATIO: CPT | Performed by: INTERNAL MEDICINE

## 2020-06-12 PROCEDURE — 84403 ASSAY OF TOTAL TESTOSTERONE: CPT | Performed by: INTERNAL MEDICINE

## 2020-06-12 PROCEDURE — 86359 T CELLS TOTAL COUNT: CPT | Performed by: INTERNAL MEDICINE

## 2020-06-12 PROCEDURE — 87536 HIV-1 QUANT&REVRSE TRNSCRPJ: CPT | Performed by: INTERNAL MEDICINE

## 2020-06-12 PROCEDURE — 86592 SYPHILIS TEST NON-TREP QUAL: CPT | Performed by: INTERNAL MEDICINE

## 2020-06-12 PROCEDURE — 86780 TREPONEMA PALLIDUM: CPT | Performed by: INTERNAL MEDICINE

## 2020-06-13 LAB
RPR SER QL: NONREACTIVE
T PALLIDUM AB SER QL: REACTIVE

## 2020-06-14 LAB — T PALLIDUM AB SER QL AGGL: REACTIVE

## 2020-06-16 LAB — TESTOST SERPL-MCNC: 409 NG/DL (ref 240–950)

## 2020-06-17 ENCOUNTER — VIRTUAL VISIT (OUTPATIENT)
Dept: INFECTIOUS DISEASES | Facility: CLINIC | Age: 55
End: 2020-06-17
Attending: INTERNAL MEDICINE
Payer: COMMERCIAL

## 2020-06-17 DIAGNOSIS — E03.9 ACQUIRED HYPOTHYROIDISM: ICD-10-CM

## 2020-06-17 DIAGNOSIS — Z21 HUMAN IMMUNODEFICIENCY VIRUS I INFECTION (H): Primary | ICD-10-CM

## 2020-06-17 DIAGNOSIS — E34.9 HYPOTESTOSTERONISM: ICD-10-CM

## 2020-06-17 DIAGNOSIS — E78.2 MIXED HYPERLIPIDEMIA: ICD-10-CM

## 2020-06-17 DIAGNOSIS — I10 ESSENTIAL HYPERTENSION: ICD-10-CM

## 2020-06-17 RX ORDER — AMLODIPINE BESYLATE 10 MG/1
10 TABLET ORAL DAILY
Qty: 90 TABLET | Refills: 3 | Status: SHIPPED | OUTPATIENT
Start: 2020-06-17 | End: 2020-12-16

## 2020-06-17 RX ORDER — ENALAPRIL MALEATE 20 MG/1
20 TABLET ORAL 2 TIMES DAILY
Qty: 180 TABLET | Refills: 3 | Status: SHIPPED | OUTPATIENT
Start: 2020-06-17 | End: 2020-12-16

## 2020-06-17 RX ORDER — LEVOTHYROXINE SODIUM 100 UG/1
TABLET ORAL
Qty: 180 TABLET | Refills: 3 | Status: SHIPPED | OUTPATIENT
Start: 2020-06-17 | End: 2020-12-16

## 2020-06-17 RX ORDER — TESTOSTERONE 30 MG/1.5ML
1 SOLUTION TOPICAL DAILY
Qty: 90 ML | Refills: 5 | Status: SHIPPED | OUTPATIENT
Start: 2020-06-17 | End: 2020-12-16

## 2020-06-17 RX ORDER — PRAVASTATIN SODIUM 10 MG
10 TABLET ORAL DAILY
Qty: 90 TABLET | Refills: 3 | Status: SHIPPED | OUTPATIENT
Start: 2020-06-17 | End: 2020-12-16

## 2020-06-17 ASSESSMENT — PAIN SCALES - GENERAL: PAINLEVEL: NO PAIN (0)

## 2020-06-17 NOTE — PROGRESS NOTES
Left voicemail for patient to call back to set up telemedicine visit, will call again before appointment time.  Dionne Sanabria CMA on 6/17/2020 at 4:20 PM

## 2020-07-02 ENCOUNTER — TELEPHONE (OUTPATIENT)
Dept: PHARMACY | Facility: CLINIC | Age: 55
End: 2020-07-02

## 2020-07-02 NOTE — TELEPHONE ENCOUNTER
Called patient for refill reminder.    Will Fed Ex prescriptions address has been verified.     6 month of on time refill.    Last Filled on: 06/05/20   Follow-up Date: 08/03/20    Brunilda Norton CPhT  [facility]  [phone]

## 2020-08-03 ENCOUNTER — TELEPHONE (OUTPATIENT)
Dept: PHARMACY | Facility: CLINIC | Age: 55
End: 2020-08-03

## 2020-08-03 NOTE — TELEPHONE ENCOUNTER
Called patient for refill reminder.    Will mail prescriptions address has been verified.       Last Filled on:   07/02/20  Follow-up Date: 09/01/20    Brunilda Norton CPhT  [facility]  [phone]

## 2020-08-20 ENCOUNTER — PATIENT OUTREACH (OUTPATIENT)
Dept: OTOLARYNGOLOGY | Facility: CLINIC | Age: 55
End: 2020-08-20

## 2020-08-20 NOTE — PROGRESS NOTES
Left voicemail requesting a call back to discuss how patient would like to proceed with Drug-Induced Sleep Endoscopy with Dr. Powell. Patient saw Dr. Powell in February 2020 and a DISE was recommended at that point. Direct contact information provided for patient.     Kirstie Day RN

## 2020-08-25 ENCOUNTER — PREP FOR PROCEDURE (OUTPATIENT)
Dept: OTOLARYNGOLOGY | Facility: CLINIC | Age: 55
End: 2020-08-25

## 2020-08-25 DIAGNOSIS — G47.33 OSA (OBSTRUCTIVE SLEEP APNEA): Primary | ICD-10-CM

## 2020-08-25 NOTE — PROGRESS NOTES
Calling patient back to discuss moving forward with a drug induced sleep endoscopy with Dr. Powell. Patient would like to move forward. Patient informed writer that he is only in [place] one week per month and will need to arrange transportation following the procedure. Writer informed patient that he will receive a call to schedule. Patient is thinking of scheduling for mid-late October. Patient denies any further questions or concerns at this time.     Kirstie Day RN

## 2020-08-26 ENCOUNTER — TELEPHONE (OUTPATIENT)
Dept: OTOLARYNGOLOGY | Facility: CLINIC | Age: 55
End: 2020-08-26

## 2020-08-27 DIAGNOSIS — Z11.59 ENCOUNTER FOR SCREENING FOR OTHER VIRAL DISEASES: Primary | ICD-10-CM

## 2020-08-27 NOTE — TELEPHONE ENCOUNTER
Talked at length with patient regarding scheduling procedure.     Surgeon: Dr. Powell   Date of Surgery: 11/11/2020. Patient will work on getting transportation home from procedure. He is only in town in frequently once a month.   Location of surgery: [facility]  Pre-Op H&P: Patient will likely be scheduled at [facility] for pre-op with COVID19 testing within 4 days because of transportation issues.   Post-Op Appt Date: will need 1 week post op   Imaging needed:  No  Discussed COVID-19 testing:  Yes    Patient will work on confirmation transportation first and then call back to discuss details. Provided patient will name and phone number.  Patient requested later in the day because of transportation. Will book for afternoon and will adjust when closer to day of procedure.       Heide Eagle  08/27/20 3:12 PM  P: [phone]    
No

## 2020-09-02 ENCOUNTER — TELEPHONE (OUTPATIENT)
Dept: PHARMACY | Facility: CLINIC | Age: 55
End: 2020-09-02

## 2020-09-02 NOTE — TELEPHONE ENCOUNTER
Attempted to contact the patient to for refill reminder call,  left message on voicemail    Last filled on: 08/04/20    Follow-up Date: 09/08/20 2nd attempt    Brunilda Norton CPhT  [facility]  [phone]

## 2020-09-04 NOTE — TELEPHONE ENCOUNTER
Pt called back.   Will mail prescriptions address has been verified.       Last Filled on:  08/06/20   Follow-up Date: 10/05/20    Brunilda Norton CPhT  [facility]  [phone]

## 2020-10-05 ENCOUNTER — TELEPHONE (OUTPATIENT)
Dept: PHARMACY | Facility: CLINIC | Age: 55
End: 2020-10-05

## 2020-10-05 NOTE — TELEPHONE ENCOUNTER
Pt called to order refill.   Will mail prescriptions address has been verified.       Last Filled on: 09/04/10   Follow-up Date: 11/03/20    Brunilda Norton CPhT  [facility]  [phone]

## 2020-11-03 ENCOUNTER — TELEPHONE (OUTPATIENT)
Dept: PHARMACY | Facility: CLINIC | Age: 55
End: 2020-11-03

## 2020-11-03 NOTE — TELEPHONE ENCOUNTER
Called patient for refill reminder.    Will Fed Ex prescriptions address has been verified.       Last Filled on: 10/03/20   Follow-up Date: 11/30/20    Brunilda Norton CPhT  [facility]  [phone]

## 2020-11-09 ENCOUNTER — PATIENT OUTREACH (OUTPATIENT)
Dept: OTOLARYNGOLOGY | Facility: CLINIC | Age: 55
End: 2020-11-09

## 2020-11-09 NOTE — PROGRESS NOTES
Left voicemail for patient requesting a call back ASAP to confirm surgery with Dr. Powell on 11/11/20. Direct contact information provided for patient. No documented pre-op completed. No documented COVID test completed or scheduled.     Kirstie Day RN

## 2020-11-10 ENCOUNTER — TELEPHONE (OUTPATIENT)
Dept: OTOLARYNGOLOGY | Facility: CLINIC | Age: 55
End: 2020-11-10

## 2020-11-10 RX ORDER — NALOXONE HYDROCHLORIDE 0.4 MG/ML
.1-.4 INJECTION, SOLUTION INTRAMUSCULAR; INTRAVENOUS; SUBCUTANEOUS
Status: CANCELLED | OUTPATIENT
Start: 2020-11-10 | End: 2020-11-11

## 2020-11-10 RX ORDER — FENTANYL CITRATE 50 UG/ML
25-50 INJECTION, SOLUTION INTRAMUSCULAR; INTRAVENOUS
Status: CANCELLED | OUTPATIENT
Start: 2020-11-10

## 2020-11-10 RX ORDER — ONDANSETRON 2 MG/ML
4 INJECTION INTRAMUSCULAR; INTRAVENOUS EVERY 30 MIN PRN
Status: CANCELLED | OUTPATIENT
Start: 2020-11-10

## 2020-11-10 RX ORDER — OXYCODONE HYDROCHLORIDE 5 MG/1
5 TABLET ORAL EVERY 4 HOURS PRN
Status: CANCELLED | OUTPATIENT
Start: 2020-11-10

## 2020-11-10 RX ORDER — ACETAMINOPHEN 325 MG/1
975 TABLET ORAL ONCE
Status: CANCELLED | OUTPATIENT
Start: 2020-11-10 | End: 2020-11-10

## 2020-11-10 RX ORDER — ONDANSETRON 4 MG/1
4 TABLET, ORALLY DISINTEGRATING ORAL EVERY 30 MIN PRN
Status: CANCELLED | OUTPATIENT
Start: 2020-11-10

## 2020-11-10 RX ORDER — GABAPENTIN 300 MG/1
300 CAPSULE ORAL ONCE
Status: CANCELLED | OUTPATIENT
Start: 2020-11-10 | End: 2020-11-10

## 2020-11-10 RX ORDER — MEPERIDINE HYDROCHLORIDE 25 MG/ML
12.5 INJECTION INTRAMUSCULAR; INTRAVENOUS; SUBCUTANEOUS
Status: CANCELLED | OUTPATIENT
Start: 2020-11-10

## 2020-11-10 RX ORDER — SODIUM CHLORIDE, SODIUM LACTATE, POTASSIUM CHLORIDE, CALCIUM CHLORIDE 600; 310; 30; 20 MG/100ML; MG/100ML; MG/100ML; MG/100ML
INJECTION, SOLUTION INTRAVENOUS CONTINUOUS
Status: CANCELLED | OUTPATIENT
Start: 2020-11-10

## 2020-11-10 NOTE — TELEPHONE ENCOUNTER
Left message regarding confirming cancellation with Dr. Powell and rescheduling options. Call back number left on voicemail [phone].       Heide Eagle   Perioperative Coordinator  Department of Otolaryngology    Office: [phone]

## 2020-11-10 NOTE — TELEPHONE ENCOUNTER
----- Message from Celestina Lambert RN sent at 11/10/2020  8:21 AM CST -----  Regarding: Reschedule  HI Heide,    Just spoke with patient, he will not be in town for his procedure with Dr. Powell tomorrow at the [facility] 11/11/20.  Patient would like to reschedule.      Thank you,Celestina HUTCHISON RN  [facility] Preop Calls

## 2020-11-11 ENCOUNTER — HOSPITAL ENCOUNTER (OUTPATIENT)
Facility: AMBULATORY SURGERY CENTER | Age: 55
End: 2020-11-11
Attending: OTOLARYNGOLOGY
Payer: COMMERCIAL

## 2020-11-11 DIAGNOSIS — G47.33 OSA (OBSTRUCTIVE SLEEP APNEA): ICD-10-CM

## 2020-11-30 ENCOUNTER — TELEPHONE (OUTPATIENT)
Dept: PHARMACY | Facility: CLINIC | Age: 55
End: 2020-11-30

## 2020-11-30 DIAGNOSIS — E78.2 MIXED HYPERLIPIDEMIA: ICD-10-CM

## 2020-11-30 RX ORDER — FENOFIBRATE 54 MG/1
TABLET ORAL
Qty: 180 TABLET | Refills: 1 | Status: SHIPPED | OUTPATIENT
Start: 2020-11-30 | End: 2020-12-16

## 2020-11-30 NOTE — TELEPHONE ENCOUNTER
Pt called to order refills.   Will Fed Ex prescriptions address has been verified.     Fenofibrate needs a refill from the clnic    Last Filled on: 11/03/20   Follow-up Date: 01/04/21    Brunilda Norton CPhT  [facility]  [phone]

## 2020-12-11 DIAGNOSIS — Z21 HUMAN IMMUNODEFICIENCY VIRUS I INFECTION (H): ICD-10-CM

## 2020-12-11 DIAGNOSIS — E78.2 MIXED HYPERLIPIDEMIA: ICD-10-CM

## 2020-12-11 LAB
ALBUMIN SERPL-MCNC: 4 G/DL (ref 3.4–5)
ALP SERPL-CCNC: 76 U/L (ref 40–150)
ALT SERPL W P-5'-P-CCNC: 44 U/L (ref 0–70)
ANION GAP SERPL CALCULATED.3IONS-SCNC: 6 MMOL/L (ref 3–14)
AST SERPL W P-5'-P-CCNC: 33 U/L (ref 0–45)
BASOPHILS # BLD AUTO: 0.1 10E9/L (ref 0–0.2)
BASOPHILS NFR BLD AUTO: 1.2 %
BILIRUB SERPL-MCNC: 0.5 MG/DL (ref 0.2–1.3)
BUN SERPL-MCNC: 14 MG/DL (ref 7–30)
CALCIUM SERPL-MCNC: 9.1 MG/DL (ref 8.5–10.1)
CD3 CELLS # BLD: 2747 CELLS/UL (ref 603–2990)
CD3 CELLS NFR BLD: 89 % (ref 49–84)
CD3+CD4+ CELLS # BLD: 1087 CELLS/UL (ref 441–2156)
CD3+CD4+ CELLS NFR BLD: 35 % (ref 28–63)
CD3+CD4+ CELLS/CD3+CD8+ CLL BLD: 0.6 % (ref 1.4–2.6)
CD3+CD8+ CELLS # BLD: 1786 CELLS/UL (ref 125–1312)
CD3+CD8+ CELLS NFR BLD: 58 % (ref 10–40)
CHLORIDE SERPL-SCNC: 109 MMOL/L (ref 94–109)
CHOLEST SERPL-MCNC: 184 MG/DL
CO2 SERPL-SCNC: 26 MMOL/L (ref 20–32)
CREAT SERPL-MCNC: 1.19 MG/DL (ref 0.66–1.25)
DIFFERENTIAL METHOD BLD: NORMAL
EOSINOPHIL # BLD AUTO: 0.3 10E9/L (ref 0–0.7)
EOSINOPHIL NFR BLD AUTO: 3.1 %
ERYTHROCYTE [DISTWIDTH] IN BLOOD BY AUTOMATED COUNT: 13.5 % (ref 10–15)
GFR SERPL CREATININE-BSD FRML MDRD: 69 ML/MIN/{1.73_M2}
GLUCOSE SERPL-MCNC: 102 MG/DL (ref 70–99)
HCT VFR BLD AUTO: 41 % (ref 40–53)
HDLC SERPL-MCNC: 62 MG/DL
HGB BLD-MCNC: 13.9 G/DL (ref 13.3–17.7)
IFC SPECIMEN: ABNORMAL
IMM GRANULOCYTES # BLD: 0 10E9/L (ref 0–0.4)
IMM GRANULOCYTES NFR BLD: 0.2 %
LDLC SERPL CALC-MCNC: 103 MG/DL
LYMPHOCYTES # BLD AUTO: 2.7 10E9/L (ref 0.8–5.3)
LYMPHOCYTES NFR BLD AUTO: 32.9 %
MCH RBC QN AUTO: 30.4 PG (ref 26.5–33)
MCHC RBC AUTO-ENTMCNC: 33.9 G/DL (ref 31.5–36.5)
MCV RBC AUTO: 90 FL (ref 78–100)
MONOCYTES # BLD AUTO: 0.5 10E9/L (ref 0–1.3)
MONOCYTES NFR BLD AUTO: 6.3 %
NEUTROPHILS # BLD AUTO: 4.6 10E9/L (ref 1.6–8.3)
NEUTROPHILS NFR BLD AUTO: 56.3 %
NONHDLC SERPL-MCNC: 122 MG/DL
NRBC # BLD AUTO: 0 10*3/UL
NRBC BLD AUTO-RTO: 0 /100
PLATELET # BLD AUTO: 370 10E9/L (ref 150–450)
POTASSIUM SERPL-SCNC: 4.1 MMOL/L (ref 3.4–5.3)
PROT SERPL-MCNC: 7.5 G/DL (ref 6.8–8.8)
RBC # BLD AUTO: 4.57 10E12/L (ref 4.4–5.9)
SODIUM SERPL-SCNC: 141 MMOL/L (ref 133–144)
TRIGL SERPL-MCNC: 94 MG/DL
WBC # BLD AUTO: 8.2 10E9/L (ref 4–11)

## 2020-12-11 PROCEDURE — 87536 HIV-1 QUANT&REVRSE TRNSCRPJ: CPT | Mod: 90 | Performed by: PATHOLOGY

## 2020-12-11 PROCEDURE — 85025 COMPLETE CBC W/AUTO DIFF WBC: CPT | Performed by: PATHOLOGY

## 2020-12-11 PROCEDURE — 36415 COLL VENOUS BLD VENIPUNCTURE: CPT | Performed by: PATHOLOGY

## 2020-12-11 PROCEDURE — 80061 LIPID PANEL: CPT | Performed by: PATHOLOGY

## 2020-12-11 PROCEDURE — 80053 COMPREHEN METABOLIC PANEL: CPT | Performed by: PATHOLOGY

## 2020-12-11 PROCEDURE — 86359 T CELLS TOTAL COUNT: CPT | Mod: 90 | Performed by: PATHOLOGY

## 2020-12-11 PROCEDURE — 86360 T CELL ABSOLUTE COUNT/RATIO: CPT | Mod: 90 | Performed by: PATHOLOGY

## 2020-12-16 ENCOUNTER — VIRTUAL VISIT (OUTPATIENT)
Dept: INFECTIOUS DISEASES | Facility: CLINIC | Age: 55
End: 2020-12-16
Attending: INTERNAL MEDICINE
Payer: COMMERCIAL

## 2020-12-16 DIAGNOSIS — Z21 HUMAN IMMUNODEFICIENCY VIRUS I INFECTION (H): Primary | ICD-10-CM

## 2020-12-16 DIAGNOSIS — E03.9 ACQUIRED HYPOTHYROIDISM: ICD-10-CM

## 2020-12-16 DIAGNOSIS — E34.9 HYPOTESTOSTERONISM: ICD-10-CM

## 2020-12-16 DIAGNOSIS — I10 ESSENTIAL HYPERTENSION: ICD-10-CM

## 2020-12-16 DIAGNOSIS — E78.2 MIXED HYPERLIPIDEMIA: ICD-10-CM

## 2020-12-16 PROCEDURE — 99214 OFFICE O/P EST MOD 30 MIN: CPT | Mod: 95 | Performed by: INTERNAL MEDICINE

## 2020-12-16 RX ORDER — AMLODIPINE BESYLATE 10 MG/1
10 TABLET ORAL DAILY
Qty: 90 TABLET | Refills: 3 | Status: SHIPPED | OUTPATIENT
Start: 2020-12-16 | End: 2021-08-18

## 2020-12-16 RX ORDER — TESTOSTERONE 30 MG/1.5ML
1 SOLUTION TOPICAL DAILY
Qty: 270 ML | Refills: 3 | Status: SHIPPED | OUTPATIENT
Start: 2020-12-16 | End: 2021-01-05

## 2020-12-16 RX ORDER — ENALAPRIL MALEATE 20 MG/1
20 TABLET ORAL 2 TIMES DAILY
Qty: 180 TABLET | Refills: 3 | Status: SHIPPED | OUTPATIENT
Start: 2020-12-16 | End: 2021-08-18

## 2020-12-16 RX ORDER — FENOFIBRATE 54 MG/1
TABLET ORAL
Qty: 180 TABLET | Refills: 3 | Status: SHIPPED | OUTPATIENT
Start: 2020-12-16 | End: 2021-08-18

## 2020-12-16 RX ORDER — LEVOTHYROXINE SODIUM 100 UG/1
TABLET ORAL
Qty: 180 TABLET | Refills: 3 | Status: SHIPPED | OUTPATIENT
Start: 2020-12-16 | End: 2021-08-18

## 2020-12-16 RX ORDER — PRAVASTATIN SODIUM 10 MG
10 TABLET ORAL DAILY
Qty: 90 TABLET | Refills: 3 | Status: SHIPPED | OUTPATIENT
Start: 2020-12-16 | End: 2021-08-18

## 2020-12-16 SDOH — HEALTH STABILITY: MENTAL HEALTH: HOW MANY STANDARD DRINKS CONTAINING ALCOHOL DO YOU HAVE ON A TYPICAL DAY?: NOT ASKED

## 2020-12-16 SDOH — HEALTH STABILITY: MENTAL HEALTH: HOW OFTEN DO YOU HAVE A DRINK CONTAINING ALCOHOL?: 2-4 TIMES A MONTH

## 2020-12-16 SDOH — HEALTH STABILITY: MENTAL HEALTH: HOW OFTEN DO YOU HAVE 6 OR MORE DRINKS ON ONE OCCASION?: NOT ASKED

## 2020-12-16 NOTE — PROGRESS NOTES
"Dennys Bee is a 55 year old gentleman who is being evaluated via a billable telephone visit.      The patient has been notified of following:     \"This telephone visit will be conducted via a call between you and your physician/provider. We have found that certain health care needs can be provided without the need for a physical exam.  This service lets us provide the care you need with a short phone conversation.  If a prescription is necessary we can send it directly to your pharmacy.  If lab work is needed we can place an order for that and you can then stop by our lab to have the test done at a later time.    Telephone visits are billed at different rates depending on your insurance coverage. During this emergency period, for some insurers they may be billed the same as an in-person visit.  Please reach out to your insurance provider with any questions.    If during the course of the call the physician/provider feels a telephone visit is not appropriate, you will not be charged for this service.\"    Patient has given verbal consent for Telephone visit?  Yes    What phone number would you like to be contacted at? [phone]    How would you like to obtain your AVS? Darío    Phone call duration: 19 minutes (6:22 - 6:41 PM)    Division of Infectious Diseases and International Medicine  Department of Medicine        [facility] OUTPATIENT TELEPHONE VISIT NOTE [facility] [address] [place] [address]  Office: [phone]  Fax:  [phone]     HISTORY OF PRESENT ILLNESS:  Gadiel Bee is a 55 year old gentleman with asymptomatic HIV infection who \"returns\" to [facility] for a semi-annual Virtual Telephone Visit (preferred by him due to Covid-19 precautions) following up his antiretroviral therapy with Biktarvy one tablet PO daily (switched to simplify dosing regimen in 8/20 from Combivir one tablet PO BID plus nevirapine 200 mg PO BID which were " "started in 5/03).  He has not missed a dose since starting Biktarvy and lacks any perceived Biktarvy side effects.  He has continued to be safe from Covid-19 infection.  His antidepressant medication prescribed by his long-term psychiatrist, Dr. Johnston (at the [facility] in [place]), was discontinued by its , so he is now off that medication and does not tolerate (based on past trials) all other options, so he is not longer on a primary antidepressant medication since Octover.  Despite being off that and the Covid-19 related anxiety, he says he is \"holding my own\", although did have a period of heightened anxiety and \"weird thoughts\" for a few weeks prior to Thanksgiving, but that passed and he feels he is \"now a bit more even-keeled\" with a tolerably adequate state of emotional health over the past couple of weeks.  He is spliting his time between being at his own home [place] the [place] and [place] at his [place] home, but beyond that he is \"not out much\" and is not exercising very regularly.  He had repeat HIV-related laboratory studies drawn on 12/11/20 which we reviewed today and look good.  He generally feels physically healthy and his only new complaint today is migratory, episodic, focal areas of pruritus which have been randomly present over the past month or more without accompanying visible skin changes.  He finds himself scratching.  He has tried skin moisturizer without clearcut improvement to the pruritus, but does find that a topical numbing spray resolves the bouts of itching.  Beyond that, he lacks additional new concerns today and reports no recent febrile or EENT symptoms, cough, dyspnea, chest pain, GI or  symptoms, or neurological symptoms.    He remains on fenofibrate 108 mg daily (plus ongoing pravastatin 10 mg PO daily) as well as amlodipine 10 mg daily (increased 5/17/17, started 5/20/15) plus enalapril (Vasotec) 20 mg PO BID (started 11/29/11 and dose " increased 1/27/15).  He has not recently had a blood pressure check.  He also remains on Synthroid 200 mcg / day (since 3/14) for chronic hypothyroidism and topical testosterone, Axiron, one 30 mg pump daily (often only taking it every other day) for hypotestosteronism / erectile dysfunction.    PAST MEDICAL HISTORY:    1. HIV, diagnosed 7/93, previously treated with AZT, 3TC and Crixivan. He was then on AZT plus efavirenz, but in 5/03 began Combivir/Sustiva and has remained virologically suppressed on this regimen since.  2. Stable chronic stage 1 kidney disease:  Nephrology Clinic evaluation (including negative MRI scan) unremarkable in 2009.  3. Hyperlipidemia previously requiring multiple medications, more hypertriglyceridemia than hypercholesterolemia -- now on fenofibrate plus Pravachol which was added 4/18/18.   4. Hyperthyroidism on levothyroxine.   5. History of difficult-to-treat anxiety and depression -- followed for a number of years by psychiatrist Dr. Abebe Johnston in [place].   6. Hypertension treated with Vasotec.   7. Acute appendicitis with rupture in the summer of 2007.  8. Cholecystectomy in the summer of 2007.   9. 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  Followed there annually.   10. 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  Previously received Sculptra.   11. History of a low testosterone level, most recently checked in 1/09 with a normal free testosterone of 14.0 and a total testosterone in the normal range of 482, managed somewhat unorthodoxly by a now-retired Urologist until 7/13.  Since then, has continued taking biweekly home exogenous testosterone IM injections and has tapered them only marginally from ~ weekly to ~ every other week (or a bit less frequent).  Total testosterone level on 5/14/14 was  2926.  12. Syphilis diagnosed 5/08, peak titer of 1:256, completed a treatment course of IM penicillin times 3, last dose in 6/08.  13. Prior rectal warts, treated with Aldara cream.  14. Infected lower left cracked mandibular molar tooth, repaired 6/10.  15. Unintentional overdose of alcohol, hydrocodone, and clonazepam for which hospitalized in [place] in 10/12.  16. Mild intermittent controlled extensor limb psoriasis.  17. Sleep apnea, diagnosed elsewhere.    Past Surgical History:   Procedure Laterality Date     APPENDECTOMY OPEN  Summer 2007.    For acute appendicitis with rupture.     CHOLECYSTECTOMY  2007.     ALLERGIES:  Penicillin and iodine.    CURRENT MEDICATIONS:  Current Outpatient Medications   Medication Sig Dispense Refill     amLODIPine (NORVASC) 10 MG tablet Take 1 tablet (10 mg) by mouth daily 90 tablet 3     aspirin 81 MG tablet Take 1 tablet by mouth daily.       bictegravir-emtricitabine-tenofovir (BIKTARVY) -25 MG per tablet Take 1 tablet by mouth daily 90 tablet 3     enalapril (VASOTEC) 20 MG tablet Take 1 tablet (20 mg) by mouth 2 times daily 180 tablet 3     fenofibrate (LOFIBRA) 54 MG tablet Take two tablets by mouth once daily. 180 tablet 3     lamoTRIgine (LAMICTAL) 200 MG tablet        levothyroxine (SYNTHROID/LEVOTHROID) 100 MCG tablet TAKE TWO TABLETS BY MOUTH EVERY  tablet 3     multivitamin, therapeutic with minerals (MULTI-VITAMIN) TABS Take 1 tablet by mouth daily       pravastatin (PRAVACHOL) 10 MG tablet Take 1 tablet (10 mg) by mouth daily 90 tablet 3     testosterone (AXIRON) 30 MG/ACT topical solution Place 1 pump (30 mg) onto the skin daily 270 mL 3     FAMILY HISTORY:  Family History   Problem Relation Age of Onset     Depression Father      Hyperlipidemia Father      Depression Other         Multiple family members.     Heart Disease Maternal Grandmother      Myocardial Infarction Maternal Grandfather         Sudden MI / death at age 51.     Hyperlipidemia  Maternal Grandfather      Obesity Paternal Grandmother      Hyperlipidemia Other         Multiple family members (Parents, brother, sister, PGM)     SOCIAL HISTORY:  Lives alone in basement apartment in [place] abut spends much of his time at his parents home in [place] in the town he grew up in and where all his siblings still live.  Unemployed, on disability based on his depression diagnosis.  Sexually active, no single steady partner, mostly with other HIV seropositive men, but always discloses his status when with HIV seronegative partners and uses protection.  In the past, has visited friends in [place] for an extended stay.  Tobacco:  Long-standing, up to 2.5 ppd in the past.  Presently 1 ppd (but smokes only half of each cigarette).  Has quit for up to three weeks several times in the past; not interested in further attempts at reduction at present.  Has a treadmill in the basement of his parents home that he can use for indoor exercise during cold weather, although he does so infrequently.  His father was diagnosed with cancer in winter 2019-20.    REVIEW OF SYSTEMS:  As per HPI.  Complete ROS is otherwise negative.    PHYSICAL EXAMINATION:  Reported vitals:  Respirations sound normal.  There were no other vitals taken for this telephone visit (none in Epic since 12/18/19).   Most aspects of the Physical Exam were unobtainable in the context of this telephone visit, but the following was ascertained:  GENERAL:  Pleasant, conversant, calm, comfortable-sounding.  ENT:  No evident hearing deficit.  Normal tone of voice and volume.  RESP: No cough, no audible wheezing.  Able to talk in full sentences.  NEURO:  Alert, oriented x 3, memory / cognition sound normal.  PSYCH: Normal affect, coherent speech, able to articulate logical thoughts and reason, no tangential thoughts, no hallucinations.    LABORATORY STUDIES (Reviewed with the patient during his appointment today):      [facility] Specimen  12/11/2020 Blood   Final     CD3 Mature T 12/11/2020 89* 49 - 84 % Final     CD4 [place] T 12/11/2020 35  28 - 63 % Final     CD8 Suppressor T 12/11/2020 58* 10 - 40 % Final     CD4:CD8 Ratio 12/11/2020 0.60* 1.40 - 2.60 Final     Absolute CD3 12/11/2020 2,747  603 - 2,990 cells/uL Final     Absolute CD4 12/11/2020 1,087  441 - 2,156 cells/uL Final     Absolute CD8 12/11/2020 1,786* 125 - 1,312 cells/uL Final     HIV-1 RNA Quant Result 12/11/2020 HIV-1 RNA Not Detected  HIVND^HIV-1 RNA Not Detected [Copies]/mL Final    Comment: The BEV AmpliPrep/BEV TaqMan HIV-1 test is an FDA-approved in vitro   nucleic acid amplification test for the quantitation of HIV-1 RNA in human   plasma (EDTA plasma) using the BEV AmpliPrep instrument for automated viral   nucleic acid extraction and the Sapient TaqMan Analyzer or Sapient TaqMan for   automated Real Time PCR amplification and detection of the viral nucleic acid   target.  Titer results are reported in copies/ml. This assay is intended for use in   conjunction with clinical presentation and other laboratory markers of disease   prognosis and for use as an aid in assessing viral response to antiretroviral   treatment as measured by changes in plasma HIV-1 RNA levels. This test should   not be used as a donor screening test to confirm the presence of HIV-1   infection.       HIV RNA QT Log 12/11/2020 Not Calculated  <1.3 [Log_copies]/mL Final     WBC 12/11/2020 8.2  4.0 - 11.0 10e9/L Final     RBC Count 12/11/2020 4.57  4.4 - 5.9 10e12/L Final     Hemoglobin 12/11/2020 13.9  13.3 - 17.7 g/dL Final     Hematocrit 12/11/2020 41.0  40.0 - 53.0 % Final     MCV 12/11/2020 90  78 - 100 fl Final     MCH 12/11/2020 30.4  26.5 - 33.0 pg Final     MCHC 12/11/2020 33.9  31.5 - 36.5 g/dL Final     RDW 12/11/2020 13.5  10.0 - 15.0 % Final     Platelet Count 12/11/2020 370  150 - 450 10e9/L Final     Diff Method 12/11/2020 Automated Method   Final     % Neutrophils 12/11/2020 56.3  % Final      % Lymphocytes 12/11/2020 32.9  % Final     % Monocytes 12/11/2020 6.3  % Final     % Eosinophils 12/11/2020 3.1  % Final     % Basophils 12/11/2020 1.2  % Final     % Immature Granulocytes 12/11/2020 0.2  % Final     Nucleated RBCs 12/11/2020 0  0 /100 Final     Absolute Neutrophil 12/11/2020 4.6  1.6 - 8.3 10e9/L Final     Absolute Lymphocytes 12/11/2020 2.7  0.8 - 5.3 10e9/L Final     Absolute Monocytes 12/11/2020 0.5  0.0 - 1.3 10e9/L Final     Absolute Eosinophils 12/11/2020 0.3  0.0 - 0.7 10e9/L Final     Absolute Basophils 12/11/2020 0.1  0.0 - 0.2 10e9/L Final     Abs Immature Granulocytes 12/11/2020 0.0  0 - 0.4 10e9/L Final     Absolute Nucleated RBC 12/11/2020 0.0   Final     Sodium 12/11/2020 141  133 - 144 mmol/L Final     Potassium 12/11/2020 4.1  3.4 - 5.3 mmol/L Final     Chloride 12/11/2020 109  94 - 109 mmol/L Final     Carbon Dioxide 12/11/2020 26  20 - 32 mmol/L Final     Anion Gap 12/11/2020 6  3 - 14 mmol/L Final     Glucose 12/11/2020 102* 70 - 99 mg/dL Final     Urea Nitrogen 12/11/2020 14  7 - 30 mg/dL Final     Creatinine 12/11/2020 1.19  0.66 - 1.25 mg/dL Final     GFR Estimate 12/11/2020 69  >60 mL/min/[1.73_m2] Final    Comment: Non  GFR Calc  Starting 12/18/2018, serum creatinine based estimated GFR (eGFR) will be   calculated using the Chronic Kidney Disease Epidemiology Collaboration   (CKD-EPI) equation.       GFR Estimate If Black 12/11/2020 80  >60 mL/min/[1.73_m2] Final    Comment:  GFR Calc  Starting 12/18/2018, serum creatinine based estimated GFR (eGFR) will be   calculated using the Chronic Kidney Disease Epidemiology Collaboration   (CKD-EPI) equation.       Calcium 12/11/2020 9.1  8.5 - 10.1 mg/dL Final     Bilirubin Total 12/11/2020 0.5  0.2 - 1.3 mg/dL Final     Albumin 12/11/2020 4.0  3.4 - 5.0 g/dL Final     Protein Total 12/11/2020 7.5  6.8 - 8.8 g/dL Final     Alkaline Phosphatase 12/11/2020 76  40 - 150 U/L Final     ALT 12/11/2020  44  0 - 70 U/L Final     AST 12/11/2020 33  0 - 45 U/L Final     Cholesterol 12/11/2020 184  <200 mg/dL Final     Triglycerides 12/11/2020 94  <150 mg/dL Final     HDL Cholesterol 12/11/2020 62  >39 mg/dL Final     LDL Cholesterol Calculated 12/11/2020 103* <100 mg/dL Final    Comment: Above desirable:  100-129 mg/dl  Borderline High:  130-159 mg/dL  High:             160-189 mg/dL  Very high:       >189 mg/dl       Non HDL Cholesterol 12/11/2020 122  <130 mg/dL Final     ASSESSMENT/PLAN:    1. Well-controlled HIV infection:  After switching his antiretroviral therapy from long-standing Combivir / nevirapine to Biktarvy last summer, he continues to have a normal absolute CD4+ cell count and an undetectable HIV plasma viral load with very good dosing adherence and tolerance of Biktarvy.  He will remain on Biktarvy and return to clinic for follow-up in eight months (with pre-labs before that visit, ordered today), or as needed before then.  2. Migratory, transient patchy pruritus:  Etiology is not certain, but likely due to skin dryness.  We discussed more aggressive topical moisturizing.  3. Presumably still well-controlled essential hypertension:  On ongoing amlodipine 10 mg PO daily (increased 5/17/17, started 5/20/15) and enalapril 20 mg PO BID (started 11/29/11 and dose increased 1/27/15) his blood pressure have been well-controlled in the past, both by office and (per his indirect report) home measurements, although with Covid-19 restrictions, recent monitoring has been sparse.  I suggest occasional home BP readings would be useful.  As usual, I again encouraged frequent aerobic exercise.  4. Controlled hyperlipidemia:  With additional of pravastatin 10 mg PO daily in early 2018 (which he tolerates well) and perhaps due to other factors (different psychiatric medications?, decreased testosterone dose, and now off zidovudine since 8/20), his most recent triglycerides on 12/11/20 remain improved at 94 even though  his fenofibrate dose was reduced from 162 mg daily to 108 mg daily on 6/18/19.  His total cholesterol also remains improved at 184 on 6/12/20 and 12/11/20 (with an LDL of 103 and HDL of 62).  He is continuing the fenofibrate dose of 108 mg PO daily for now, but could consider decreasing it further to one tablet daily in the future.  5. Testosterone replacement therapy / previous erectile dysfunction:  His most recent total testosterone level on 6/12/20 was normal and appropriate at 409 on Axiron  transcutaneous testosterone topical replacement therapy, initially at two (30 mg) pumps (= 60 mg) daily (since late 1/15), but now in the past year at one pump daily since mid-2019.  Will repeat a total testosterone with his next blood draw.  6. Appropriately-treated hypothyroidism:  He feels well and his TSH was again normal on 12/13/19 at 1.79, so he continues taking the same levothyroxine dose of 200 mcg / day (which was lowered in 3/14).  Will repeat a TSH with his next blood draw.  7. BPH / nocturnal urinary frequency:  Nocturia was not discussed today.  He had palpable prostatic enlargement on 10/18/17 digital rectal exam with a normal PSA screen.  He is not been interested in trying Flomax.  8. 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  This is another reason he has decided to switch off zidovudine in summer 2020, although there has not been any evident progression of his lipodystrophy for a number of years.  9. Chronic, intractable depression / anxiety / insomnia / agoraphobia:  Managed by Dr. Johnston, his psychiatrist in the [facility] in [place], who he sees often.  His nefazodone (Serzone) 100 mg PO TID medication apparently discontinued manufacture..  10. History of prior, mild, mid-winter vitamin D deficiency:  He self-takes OTC vitamin D supplements during some white but not yet  this year, although whether he even needs that is questionable, because his 10/12/18 vitamin D level was adequate at 20.  11. Mild psoriasis, presently in remission:  Well-controlled with topical Clobex prn.  Not discussed today.  12. Health Care Maintenance:  Influenza vaccine received 9/30/20.  Menactra vaccine given 11/18/15.  Tdap was boosted 1/21/15.  Prevnar 13 given 10/16/13; Pneumovax given 1/21/15.  HAV / HBV immunized / seroimmune.  HCV negative 6/1/09.  Negative rectal Pap done by Dr. Sanchez 6/28/10.  Antitreponemal antibody positive since 8/27/09 but repeat RPR was again negative on 6/12/20.  Quantiferon Gold was negative 11/12/15.  A screening hemoglobin A1C (because he has had several slightly elevated random glucoses over the past several years) was normal at 5.6% on 6/14/19.  Sees a dentist annually.  Sees a sleep apnea specialist for Cpap.  Saw an ophthalmologist in early 2012.  Had a  non-[facility] dermatologist appointment for a complete skin check in early 2019 and was told he had only chronic lentiga without any concerning lesions.  Underwent a routine screening colonoscopy at [facility] in [place], [place], in 1/19 which was normal (without polyps, per the patient) except for diverticulosis  -- encouraged to intake more fiber.

## 2020-12-18 ENCOUNTER — TELEPHONE (OUTPATIENT)
Dept: INFECTIOUS DISEASES | Facility: CLINIC | Age: 55
End: 2020-12-18

## 2020-12-30 DIAGNOSIS — E34.9 HYPOTESTOSTERONISM: ICD-10-CM

## 2021-01-04 ENCOUNTER — TELEPHONE (OUTPATIENT)
Dept: PHARMACY | Facility: CLINIC | Age: 56
End: 2021-01-04

## 2021-01-04 NOTE — TELEPHONE ENCOUNTER
Pt called to order refills.   Will fed ex prescriptions address has been verified.       Last Filled on:  12/02/20  Follow-up Date: 01/29/21    Brunilda Norton CPhT  [facility]  [phone]

## 2021-01-05 RX ORDER — TESTOSTERONE 30 MG/1.5ML
SOLUTION TOPICAL
Qty: 270 ML | Refills: 5 | Status: SHIPPED | OUTPATIENT
Start: 2021-01-05 | End: 2021-08-18

## 2021-01-06 NOTE — TELEPHONE ENCOUNTER
"Rx defaulted to \"local print\" and has not been sent to the pharmacy, please fax to pharmacy, call in verbal, or send e-scribe, thanks!    Thank you!  Cris Ballesteros [facility]    "

## 2021-01-15 ENCOUNTER — HEALTH MAINTENANCE LETTER (OUTPATIENT)
Age: 56
End: 2021-01-15

## 2021-01-29 ENCOUNTER — TELEPHONE (OUTPATIENT)
Dept: PHARMACY | Facility: CLINIC | Age: 56
End: 2021-01-29

## 2021-01-29 NOTE — TELEPHONE ENCOUNTER
Called patient for refill reminder.    Will mail prescriptions address has been verified.       Last Filled on: 12/31/20   Follow-up Date: 03/01/21    Brunilda Norton CPhT  [facility]  [phone]

## 2021-03-01 ENCOUNTER — TELEPHONE (OUTPATIENT)
Dept: PHARMACY | Facility: CLINIC | Age: 56
End: 2021-03-01

## 2021-03-01 NOTE — TELEPHONE ENCOUNTER
Called patient for refill reminder.    Will mail prescriptions address has been verified.       Last Filled on: 01/29/21   Follow-up Date: 03/30/21    Brunilda Norton CPhT  [facility]  [phone]

## 2021-03-30 ENCOUNTER — TELEPHONE (OUTPATIENT)
Dept: PHARMACY | Facility: CLINIC | Age: 56
End: 2021-03-30

## 2021-03-30 NOTE — TELEPHONE ENCOUNTER
Called patient for refill reminder.    Will mail prescriptions address has been verified.     Day Supply 30 ds except amlodipine, levothyroxine 90 ds     Last Filled on:  03/01/21  Follow-up Date: 04/10/21 Testosterone then 04/30/21    Brunilda Norton CPhT  [facility]  [phone]

## 2021-04-29 ENCOUNTER — TELEPHONE (OUTPATIENT)
Dept: PHARMACY | Facility: CLINIC | Age: 56
End: 2021-04-29

## 2021-04-29 NOTE — TELEPHONE ENCOUNTER
Called patient for refill reminder.    Will mail prescriptions address has been verified.   30 and 90  day supply             Last Filled on: 03/31/21   Follow-up Date: 06/01/21    Brunilda Norton CPhT  [facility]  [phone]

## 2021-05-28 ENCOUNTER — TELEPHONE (OUTPATIENT)
Dept: PHARMACY | Facility: CLINIC | Age: 56
End: 2021-05-28

## 2021-05-28 NOTE — TELEPHONE ENCOUNTER
Called patient for refill reminder.    Will mail prescriptions address has been verified.   30 and 90 day supply       Last Filled on: 04/30/21   Follow-up Date: 06/28/21    Brunilda Norton CPhT  [facility]  [phone]

## 2021-06-28 ENCOUNTER — TELEPHONE (OUTPATIENT)
Dept: PHARMACY | Facility: CLINIC | Age: 56
End: 2021-06-28

## 2021-06-28 NOTE — TELEPHONE ENCOUNTER
Attempted to contact the patient to for refill reminder call,  left message on voicemail    Last filled on: 06/01/21    Follow-up Date: 07/02/21 2nd attempt    Brunilda Norton CPhT  [facility]  [phone]

## 2021-07-01 NOTE — TELEPHONE ENCOUNTER
Pt called back to order refill.   Will mail prescriptions address has been verified.   90 day supply       Last Filled on: 06/01/21   Follow-up Date: 08/01/21    Brunilda Norton CPhT  [facility]  [phone]

## 2021-08-13 ENCOUNTER — LAB (OUTPATIENT)
Dept: LAB | Facility: CLINIC | Age: 56
End: 2021-08-13
Payer: COMMERCIAL

## 2021-08-13 DIAGNOSIS — Z21 HUMAN IMMUNODEFICIENCY VIRUS I INFECTION (H): ICD-10-CM

## 2021-08-13 DIAGNOSIS — E78.2 MIXED HYPERLIPIDEMIA: ICD-10-CM

## 2021-08-13 DIAGNOSIS — E03.9 ACQUIRED HYPOTHYROIDISM: ICD-10-CM

## 2021-08-13 DIAGNOSIS — E34.9 HYPOTESTOSTERONISM: ICD-10-CM

## 2021-08-13 LAB
ALBUMIN SERPL-MCNC: 4.2 G/DL (ref 3.4–5)
ALP SERPL-CCNC: 72 U/L (ref 40–150)
ALT SERPL W P-5'-P-CCNC: 35 U/L (ref 0–70)
ANION GAP SERPL CALCULATED.3IONS-SCNC: 6 MMOL/L (ref 3–14)
AST SERPL W P-5'-P-CCNC: 22 U/L (ref 0–45)
BASOPHILS # BLD AUTO: 0.1 10E3/UL (ref 0–0.2)
BASOPHILS NFR BLD AUTO: 1 %
BILIRUB SERPL-MCNC: 0.4 MG/DL (ref 0.2–1.3)
BUN SERPL-MCNC: 14 MG/DL (ref 7–30)
CALCIUM SERPL-MCNC: 9.6 MG/DL (ref 8.5–10.1)
CHLORIDE BLD-SCNC: 108 MMOL/L (ref 94–109)
CHOLEST SERPL-MCNC: 203 MG/DL
CO2 SERPL-SCNC: 26 MMOL/L (ref 20–32)
CREAT SERPL-MCNC: 1.13 MG/DL (ref 0.66–1.25)
EOSINOPHIL # BLD AUTO: 0.3 10E3/UL (ref 0–0.7)
EOSINOPHIL NFR BLD AUTO: 4 %
ERYTHROCYTE [DISTWIDTH] IN BLOOD BY AUTOMATED COUNT: 13.2 % (ref 10–15)
FASTING STATUS PATIENT QL REPORTED: YES
GFR SERPL CREATININE-BSD FRML MDRD: 74 ML/MIN/1.73M2
GLUCOSE BLD-MCNC: 107 MG/DL (ref 70–99)
HCT VFR BLD AUTO: 40.1 % (ref 40–53)
HDLC SERPL-MCNC: 44 MG/DL
HGB BLD-MCNC: 13.8 G/DL (ref 13.3–17.7)
IMM GRANULOCYTES # BLD: 0 10E3/UL
IMM GRANULOCYTES NFR BLD: 0 %
LDLC SERPL CALC-MCNC: 121 MG/DL
LIPASE SERPL-CCNC: 126 U/L (ref 73–393)
LYMPHOCYTES # BLD AUTO: 3.2 10E3/UL (ref 0.8–5.3)
LYMPHOCYTES NFR BLD AUTO: 39 %
MCH RBC QN AUTO: 31 PG (ref 26.5–33)
MCHC RBC AUTO-ENTMCNC: 34.4 G/DL (ref 31.5–36.5)
MCV RBC AUTO: 90 FL (ref 78–100)
MONOCYTES # BLD AUTO: 0.5 10E3/UL (ref 0–1.3)
MONOCYTES NFR BLD AUTO: 6 %
NEUTROPHILS # BLD AUTO: 4.1 10E3/UL (ref 1.6–8.3)
NEUTROPHILS NFR BLD AUTO: 50 %
NONHDLC SERPL-MCNC: 159 MG/DL
NRBC # BLD AUTO: 0 10E3/UL
NRBC BLD AUTO-RTO: 0 /100
PLATELET # BLD AUTO: 344 10E3/UL (ref 150–450)
POTASSIUM BLD-SCNC: 4.1 MMOL/L (ref 3.4–5.3)
PROT SERPL-MCNC: 7.8 G/DL (ref 6.8–8.8)
RBC # BLD AUTO: 4.45 10E6/UL (ref 4.4–5.9)
SODIUM SERPL-SCNC: 140 MMOL/L (ref 133–144)
TRIGL SERPL-MCNC: 190 MG/DL
TSH SERPL DL<=0.005 MIU/L-ACNC: 0.11 MU/L (ref 0.4–4)
WBC # BLD AUTO: 8.3 10E3/UL (ref 4–11)

## 2021-08-13 PROCEDURE — 84443 ASSAY THYROID STIM HORMONE: CPT | Performed by: PATHOLOGY

## 2021-08-13 PROCEDURE — 36415 COLL VENOUS BLD VENIPUNCTURE: CPT | Performed by: PATHOLOGY

## 2021-08-13 PROCEDURE — 84403 ASSAY OF TOTAL TESTOSTERONE: CPT | Performed by: INTERNAL MEDICINE

## 2021-08-13 PROCEDURE — 87536 HIV-1 QUANT&REVRSE TRNSCRPJ: CPT | Performed by: INTERNAL MEDICINE

## 2021-08-13 PROCEDURE — 83690 ASSAY OF LIPASE: CPT | Performed by: PATHOLOGY

## 2021-08-13 PROCEDURE — 80061 LIPID PANEL: CPT | Performed by: PATHOLOGY

## 2021-08-13 PROCEDURE — 86359 T CELLS TOTAL COUNT: CPT | Performed by: INTERNAL MEDICINE

## 2021-08-13 PROCEDURE — 85025 COMPLETE CBC W/AUTO DIFF WBC: CPT | Performed by: PATHOLOGY

## 2021-08-13 PROCEDURE — 80053 COMPREHEN METABOLIC PANEL: CPT | Performed by: PATHOLOGY

## 2021-08-14 LAB
CD3 CELLS # BLD: 2827 CELLS/UL (ref 603–2990)
CD3 CELLS NFR BLD: 90 % (ref 49–84)
CD3+CD4+ CELLS # BLD: 1197 CELLS/UL (ref 441–2156)
CD3+CD4+ CELLS NFR BLD: 38 % (ref 28–63)
CD3+CD4+ CELLS/CD3+CD8+ CLL BLD: 0.67 % (ref 1.4–2.6)
CD3+CD8+ CELLS # BLD: 1788 CELLS/UL (ref 125–1312)
CD3+CD8+ CELLS NFR BLD: 57 % (ref 10–40)
T CELL COMMENT: ABNORMAL

## 2021-08-16 LAB — HIV1 RNA # PLAS NAA DL=20: NOT DETECTED COPIES/ML

## 2021-08-17 LAB — TESTOST SERPL-MCNC: 282 NG/DL (ref 240–950)

## 2021-08-18 ENCOUNTER — VIRTUAL VISIT (OUTPATIENT)
Dept: INFECTIOUS DISEASES | Facility: CLINIC | Age: 56
End: 2021-08-18
Attending: INTERNAL MEDICINE
Payer: COMMERCIAL

## 2021-08-18 DIAGNOSIS — E34.9 HYPOTESTOSTERONISM: ICD-10-CM

## 2021-08-18 DIAGNOSIS — E03.9 ACQUIRED HYPOTHYROIDISM: ICD-10-CM

## 2021-08-18 DIAGNOSIS — Z21 HUMAN IMMUNODEFICIENCY VIRUS I INFECTION (H): Primary | ICD-10-CM

## 2021-08-18 DIAGNOSIS — R35.1 BENIGN PROSTATIC HYPERPLASIA WITH NOCTURIA: ICD-10-CM

## 2021-08-18 DIAGNOSIS — N40.1 BENIGN PROSTATIC HYPERPLASIA WITH NOCTURIA: ICD-10-CM

## 2021-08-18 DIAGNOSIS — I10 ESSENTIAL HYPERTENSION: ICD-10-CM

## 2021-08-18 DIAGNOSIS — E78.2 MIXED HYPERLIPIDEMIA: ICD-10-CM

## 2021-08-18 PROCEDURE — 99443 PR PHYSICIAN TELEPHONE EVALUATION 21-30 MIN: CPT | Performed by: INTERNAL MEDICINE

## 2021-08-18 RX ORDER — BUPROPION HYDROCHLORIDE 150 MG/1
150 TABLET ORAL DAILY
COMMUNITY
Start: 2021-08-04

## 2021-08-18 RX ORDER — PRAVASTATIN SODIUM 10 MG
10 TABLET ORAL DAILY
Qty: 90 TABLET | Refills: 3 | Status: SHIPPED | OUTPATIENT
Start: 2021-08-18 | End: 2022-01-04

## 2021-08-18 RX ORDER — ENALAPRIL MALEATE 20 MG/1
20 TABLET ORAL 2 TIMES DAILY
Qty: 180 TABLET | Refills: 3 | Status: SHIPPED | OUTPATIENT
Start: 2021-08-18 | End: 2022-01-04

## 2021-08-18 RX ORDER — TESTOSTERONE 30 MG/1.5ML
1 SOLUTION TOPICAL DAILY
Qty: 270 ML | Refills: 5 | Status: SHIPPED | OUTPATIENT
Start: 2021-08-18 | End: 2022-08-17

## 2021-08-18 RX ORDER — AMLODIPINE BESYLATE 10 MG/1
10 TABLET ORAL DAILY
Qty: 90 TABLET | Refills: 3 | Status: SHIPPED | OUTPATIENT
Start: 2021-08-18 | End: 2022-01-04

## 2021-08-18 RX ORDER — TAMSULOSIN HYDROCHLORIDE 0.4 MG/1
0.4 CAPSULE ORAL EVERY EVENING
Qty: 30 CAPSULE | Refills: 3 | Status: SHIPPED | OUTPATIENT
Start: 2021-08-18 | End: 2022-01-04

## 2021-08-18 RX ORDER — FENOFIBRATE 54 MG/1
TABLET ORAL
Qty: 180 TABLET | Refills: 3 | Status: SHIPPED | OUTPATIENT
Start: 2021-08-18 | End: 2022-01-04

## 2021-08-18 RX ORDER — LEVOTHYROXINE SODIUM 100 UG/1
TABLET ORAL
Qty: 180 TABLET | Refills: 3 | Status: SHIPPED | OUTPATIENT
Start: 2021-08-18 | End: 2022-01-04

## 2021-08-18 NOTE — PROGRESS NOTES
Dennys Bee is a 55 year old gentleman who is being evaluated via a billable telephone visit.      What phone number would you like to be contacted at? [phone]  How would you like to obtain your AVS? Darío  Phone call duration: 21 minutes (6:48 - 7:09 PM)  Patient has been notified that Telephone appointments will no longer be available in the future.    Division of Infectious Diseases and International Medicine  Department of Medicine        [facility] OUTPATIENT TELEPHONE VISIT NOTE [place] Mail [address] [place] [address]  Office: [phone]  Fax:  [phone]     HISTORY OF PRESENT ILLNESS:    Mr. Bee is a 55 year old gentleman with asymptomatic HIV infection who was scheduled for a follow-up Virtual Telephone Visit today regarding his ongoing antiretroviral therapy with Biktarvy one tablet PO daily (switched to simplify dosing regimen in 8/20 from Combivir one tablet PO BID plus nevirapine 200 mg PO BID which were started in 5/03) as well as other chronic health issues.  He doses the Biktarvy highly consistently and lacks drug side effects.  He had monitoring laboratory studies drawn on 8/18/21 and would like the results.  He continues to successfully avoid Covid-19 infection.  He is wondering when he should seek a third Covid-19 booster vaccine dose.  He complains today of nocturia, two to six times per night for quite some time -- he was previously followed by a (now deliscensed) urologist for this as well as for Peyronie's disease a number of years ago and would like to see one for a follow-up.  He does not notice much daytime frequency.  He has not taken Flomax before and is willing to try that in the meantime.  He remains on amlodipine and enalapril for hypertension with recent blood pressures apparently well-controlled, on low-dose pravastatin plus fenofibrate for mixed hyperlipidemia (with slightly higher cholesterol level recently), on  levothyroxine for hypothyroidism, and on Axiron topically for hypotestosteronism without current concerns.  The pruritus he had last mid-winter resolved.  He continues to follow with his long-term psychiatrist, Dr. Johnston (at the [facility] in [place]) but seems to feel his mood is relatively stable of late.  He is not exercising much despite the summer light and weather.  Beyond these issues, he otherwise feels stably healthy recently and lacks additional concerns today and reports no recent febrile or EENT symptoms, cough, dyspnea, chest pain, GI or  symptoms, or neurological symptoms.    PAST MEDICAL HISTORY:    1. HIV, diagnosed 7/93, previously treated with AZT, 3TC and Crixivan. He was then on AZT plus efavirenz, but in 5/03 began Combivir/Sustiva and has remained virologically suppressed on this regimen since.  2. Stable chronic stage 1 kidney disease:  Nephrology Clinic evaluation (including negative MRI scan) unremarkable in 2009.  3. Hyperlipidemia previously requiring multiple medications, more hypertriglyceridemia than hypercholesterolemia -- now on fenofibrate plus Pravachol which was added 4/18/18.   4. Hyperthyroidism on levothyroxine.   5. History of difficult-to-treat anxiety and depression -- followed for a number of years by psychiatrist Dr. Abebe Johnston in [place].   6. Hypertension treated with Vasotec.   7. Acute appendicitis with rupture in the summer of 2007.  8. Cholecystectomy in the summer of 2007.   9. 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  Followed there annually.   10. 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  Previously received Sculptra.   11. History of a low testosterone level, most recently checked in 1/09 with a normal free testosterone of 14.0 and a total testosterone in  the normal range of 482, managed somewhat unorthodoxly by a now-retired Urologist until 7/13.  Since then, has continued taking biweekly home exogenous testosterone IM injections and has tapered them only marginally from ~ weekly to ~ every other week (or a bit less frequent).  Total testosterone level on 5/14/14 was 2926.  12. Syphilis diagnosed 5/08, peak titer of 1:256, completed a treatment course of IM penicillin times 3, last dose in 6/08.  13. Prior rectal warts, treated with Aldara cream.  14. Infected lower left cracked mandibular molar tooth, repaired 6/10.  15. Unintentional overdose of alcohol, hydrocodone, and clonazepam for which hospitalized in [place] in 10/12.  16. Mild intermittent controlled extensor limb psoriasis.  17. Sleep apnea, diagnosed elsewhere.    Past Surgical History:   Procedure Laterality Date     APPENDECTOMY OPEN  Summer 2007.    For acute appendicitis with rupture.     CHOLECYSTECTOMY  2007.     ALLERGIES:  Penicillin and iodine.    CURRENT MEDICATIONS:  Current Outpatient Medications   Medication Sig Dispense Refill     amLODIPine (NORVASC) 10 MG tablet Take 1 tablet (10 mg) by mouth daily 90 tablet 3     aspirin 81 MG tablet Take 1 tablet by mouth daily.       bictegravir-emtricitabine-tenofovir (BIKTARVY) -25 MG per tablet Take 1 tablet by mouth daily 90 tablet 3     buPROPion (WELLBUTRIN XL) 150 MG 24 hr tablet 150 mg daily Patient takes 3 tabs daily for total 450mg       enalapril (VASOTEC) 20 MG tablet Take 1 tablet (20 mg) by mouth 2 times daily 180 tablet 3     fenofibrate (LOFIBRA) 54 MG tablet Take two tablets by mouth once daily. 180 tablet 3     lamoTRIgine (LAMICTAL) 200 MG tablet        levothyroxine (SYNTHROID/LEVOTHROID) 100 MCG tablet TAKE TWO TABLETS BY MOUTH EVERY  tablet 3     multivitamin, therapeutic with minerals (MULTI-VITAMIN) TABS Take 1 tablet by mouth daily       pravastatin (PRAVACHOL) 10 MG tablet Take 1 tablet (10 mg) by mouth daily 90  tablet 3     tamsulosin (FLOMAX) 0.4 MG capsule Take 1 capsule (0.4 mg) by mouth every evening 30 capsule 3     testosterone (AXIRON) 30 MG/ACT topical solution Place 1 pump (30 mg) onto the skin daily 270 mL 5     FAMILY HISTORY:  Family History   Problem Relation Age of Onset     Depression Father      Hyperlipidemia Father      Depression Other         Multiple family members.     Heart Disease Maternal Grandmother      Myocardial Infarction Maternal Grandfather         Sudden MI / death at age 51.     Hyperlipidemia Maternal Grandfather      Obesity Paternal Grandmother      Hyperlipidemia Other         Multiple family members (Parents, brother, sister, PGM)     SOCIAL HISTORY:  Lives alone in basement apartment in [place]ut spends much of his time at his parents home in [place] in the town he grew up in and where all his siblings still live.  Unemployed, on disability based on his depression diagnosis.  Sexually active, no single steady partner, mostly with other HIV seropositive men, but always discloses his status when with HIV seronegative partners and uses protection.  In the past, has visited friends in [place] for an extended stay.  Tobacco:  Long-standing, up to 2.5 ppd in the past.  Presently 1 ppd (but smokes only half of each cigarette).  Has quit for up to three weeks several times in the past; not interested in further attempts at reduction at present.  Has a treadmill in the basement of his parents home that he can use for indoor exercise during cold weather, although he does so infrequently.  His father was diagnosed with cancer in winter 2019-20.    REVIEW OF SYSTEMS:  As per HPI.  Complete ROS is otherwise negative.    PHYSICAL EXAMINATION:  Reported vitals:  Respirations sound normal.  There were no other vitals taken for this telephone visit (none in Epic since 12/18/19).   Most aspects of the Physical Exam were unobtainable in the context of this telephone visit, but the  following was ascertained:  GENERAL:  Pleasant, conversant, calm, comfortable-sounding.  ENT:  No evident hearing deficit.  Normal tone of voice and volume.  RESP: No cough, no audible wheezing.  Able to talk in full sentences.  NEURO:  Alert, oriented x 3, memory / cognition sound normal.  PSYCH: Normal affect, coherent speech, able to articulate logical thoughts and reason, no tangential thoughts, no hallucinations.    LABORATORY STUDIES (Reviewed with the patient during his appointment today):      Testosterone Total 08/13/2021 282  240 - 950 ng/dL Final     Cholesterol 08/13/2021 203* <200 mg/dL Final    Age 0-19 years  Desirable: <170 mg/dL  Borderline high:  170-199 mg/dl  High:            >199 mg/dl    Age 20 years and older  Desirable: <200 mg/dL     Triglycerides 08/13/2021 190* <150 mg/dL Final    0-9 years:  Normal:    Less than 75 mg/dL  Borderline high:  75-99 mg/dL  High:             Greater than or equal to 100 mg/dL    0-19 years:  Normal:    Less than 90 mg/dL  Borderline high:   mg/dL  High:             Greater than or equal to 130 mg/dL    20 years and older:  Normal:    Less than 150 mg/dL  Borderline high:  150-199 mg/dL  High:             200-499 mg/dL  Very high:   Greater than or equal to 500 mg/dL     Direct Measure HDL 08/13/2021 44  >=40 mg/dL Final    0-19 years:       Greater than or equal to 45 mg/dL   Low: Less than 40 mg/dL   Borderline low: 40-44 mg/dL     20 years and older:   Female: Greater than or equal to 50 mg/dL   Male:   Greater than or equal to 40 mg/dL          LDL Cholesterol Calculated 08/13/2021 121* <=100 mg/dL Final    Age 0-19 years:  Desirable: 0-110 mg/dL   Borderline high: 110-129 mg/dL   High: >= 130 mg/dL    Age 20 years and older:  Desirable: <100mg/dL  Above desirable: 100-129 mg/dL   Borderline high: 130-159 mg/dL   High: 160-189 mg/dL   Very high: >= 190 mg/dL     Non HDL Cholesterol 08/13/2021 159* <130 mg/dL Final    0-19 years:  Desirable:           Less than 120 mg/dL  Borderline high:   120-144 mg/dL  High:                   Greater than or equal to 145 mg/dL    20 years and older:  Desirable:          130 mg/dL  Above Desirable: 130-159 mg/dL  Borderline high:   160-189 mg/dL  High:               190-219 mg/dL  Very high:     Greater than or equal to 220 mg/dL     Patient Fasting > 8hrs? 08/13/2021 Yes   Final     TSH 08/13/2021 0.11* 0.40 - 4.00 mU/L Final     Lipase 08/13/2021 126  73 - 393 U/L Final     Sodium 08/13/2021 140  133 - 144 mmol/L Final     Potassium 08/13/2021 4.1  3.4 - 5.3 mmol/L Final     Chloride 08/13/2021 108  94 - 109 mmol/L Final     Carbon Dioxide (CO2) 08/13/2021 26  20 - 32 mmol/L Final     Anion Gap 08/13/2021 6  3 - 14 mmol/L Final     Urea Nitrogen 08/13/2021 14  7 - 30 mg/dL Final     Creatinine 08/13/2021 1.13  0.66 - 1.25 mg/dL Final     Calcium 08/13/2021 9.6  8.5 - 10.1 mg/dL Final     Glucose 08/13/2021 107* 70 - 99 mg/dL Final     Alkaline Phosphatase 08/13/2021 72  40 - 150 U/L Final     AST 08/13/2021 22  0 - 45 U/L Final     ALT 08/13/2021 35  0 - 70 U/L Final     Protein Total 08/13/2021 7.8  6.8 - 8.8 g/dL Final     Albumin 08/13/2021 4.2  3.4 - 5.0 g/dL Final     Bilirubin Total 08/13/2021 0.4  0.2 - 1.3 mg/dL Final     GFR Estimate 08/13/2021 74  >60 mL/min/1.73m2 Final    As of July 11, 2021, eGFR is calculated by the CKD-EPI creatinine equation, without race adjustment. eGFR can be influenced by muscle mass, exercise, and diet. The reported eGFR is an estimation only and is only applicable if the renal function is stable.     CD3% Total T Cells 08/13/2021 90* 49 - 84 % Final     Absolute CD3, Total T Cells 08/13/2021 2,827  603-2,990 cells/uL Final     CD4% Emma T Cells 08/13/2021 38  28 - 63 % Final     Absolute CD4, Emma T Cells 08/13/2021 1,197  441-2,156 cells/uL Final     CD8% Suppressor T Cells 08/13/2021 57* 10 - 40 % Final     Absolute CD8, Suppressor T Cells 08/13/2021 1,788* 125-1,312 cells/uL  Final     CD4:CD8 Ratio 08/13/2021 0.67* 1.40 - 2.60 Final     HIV-1 RNA copies/mL 08/13/2021 Not Detected  Not Detected Copies/mL Final     WBC Count 08/13/2021 8.3  4.0 - 11.0 10e3/uL Final     RBC Count 08/13/2021 4.45  4.40 - 5.90 10e6/uL Final     Hemoglobin 08/13/2021 13.8  13.3 - 17.7 g/dL Final     Hematocrit 08/13/2021 40.1  40.0 - 53.0 % Final     MCV 08/13/2021 90  78 - 100 fL Final     MCH 08/13/2021 31.0  26.5 - 33.0 pg Final     MCHC 08/13/2021 34.4  31.5 - 36.5 g/dL Final     RDW 08/13/2021 13.2  10.0 - 15.0 % Final     Platelet Count 08/13/2021 344  150 - 450 10e3/uL Final     % Neutrophils 08/13/2021 50  % Final     % Lymphocytes 08/13/2021 39  % Final     % Monocytes 08/13/2021 6  % Final     % Eosinophils 08/13/2021 4  % Final     % Basophils 08/13/2021 1  % Final     % Immature Granulocytes 08/13/2021 0  % Final     NRBCs per 100 WBC 08/13/2021 0  <1 /100 Final     Absolute Neutrophils 08/13/2021 4.1  1.6 - 8.3 10e3/uL Final     Absolute Lymphocytes 08/13/2021 3.2  0.8 - 5.3 10e3/uL Final     Absolute Monocytes 08/13/2021 0.5  0.0 - 1.3 10e3/uL Final     Absolute Eosinophils 08/13/2021 0.3  0.0 - 0.7 10e3/uL Final     Absolute Basophils 08/13/2021 0.1  0.0 - 0.2 10e3/uL Final     Absolute Immature Granulocytes 08/13/2021 0.0  <=0.0 10e3/uL Final     Absolute NRBCs 08/13/2021 0.0  10e3/uL Final     ASSESSMENT/PLAN:    1. Asymptomatic HIV infection:  After switching his antiretroviral therapy from long-standing Combivir / nevirapine to Biktarvy in 6/20, his absolute CD4+ cell count remains normal with an undetectable HIV plasma viral load and excellent dosing adherence and drug tolerance.  He will continue taking Biktarvy and return to clinic for follow-up in about a year (with pre-labs before that visit, ordered today), or as needed before then.  2. Nocturia / presumed benign prostatism / history of Peyronie's disease:  On a Telephone visit today, I am unable to perform a prostate exam, but his  symptoms are consistent with BPH.  He did have a palpable prostatic enlargement on 10/18/17 digital rectal exam with a normal PSA screen at that time.  We will start a trial of Flomax 0.4 mg PO q evening and, at his request, refer him for a Urology evaluation given both the presumed BPH and the history of Peyronie's disease.  3. Resolved, prior, migratory, transient patchy pruritus:  That was likely due to skin dryness of mid-winter -- treated with aggressive topical moisturizing and change of seasons.  4. Presumably still well-controlled essential hypertension:  We lack recent in-person blood pressure measurements, but according to his infrequent checks, on ongoing amlodipine 10 mg PO daily (increased 5/17/17, started 5/20/15) and enalapril 20 mg PO BID (started 11/29/11 and dose increased 1/27/15) his blood pressure has been controlled in the past, by his home measurements.  As usual, I again encouraged frequent aerobic exercise.  5. Marginally controlled hyperlipidemia:  On pravastatin 10 mg PO daily in early 2018 (which he tolerates well) and perhaps due to other factors (different psychiatric medications?, decreased testosterone dose, and off zidovudine since 8/20), his cholesterol and triglycerides in 12/20 were fairly good, but now his cholesterol is again a bit less well controlled.  We will continue the current low pravastatin dose for now, but consider increasing it at the next appointment if his cholesterol remains higher.  His triglycerides remain improved versus prior years, but still higher than optimal -- will continue the fenofibrate.  6. Testosterone replacement therapy / previous erectile dysfunction:  His most recent total testosterone level on 8/13/21 remains normal although lower at 282 on ongoing Axiron transcutaneous testosterone topical replacement therapy, initially at two (30 mg) pumps (= 60 mg) daily (since late 1/15), but now at one pump daily since mid-2019.  Will continue the present  dose  7. Appropriately-treated hypothyroidism:  He feels well and his TSH was again normal (even low) at 0.11 on 8/13/21, so will continue the same levothyroxine dose of 200 mcg / day (which was lowered in 3/14) for now, but may consider decreasing the dose in a year of the next year's follow-up TSH is similarly quite low.  8. 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  This is another reason he has decided to switch off zidovudine in summer 2020, although there has not been any evident progression of his lipodystrophy for a number of years.  9. Chronic, intractable depression / anxiety / insomnia / agoraphobia:  Managed by Dr. Johnston, his psychiatrist in the [facility] in [place], who he sees often.  10. History of prior, mild, mid-winter vitamin D deficiency:  He self-takes OTC vitamin D supplements during some white but not yet this year, although whether he even needs that is questionable, because his 10/12/18 vitamin D level was adequate at 20.  11. Mild psoriasis, presently in remission:  Well-controlled with topical Clobex prn.  Shsom mot discussed today.  12. Health Care Maintenance:  Received Covid-19 (Moderna) vaccination on 4/11/21 and 5/9/21 -- discussed that his third booster dose will likely be due at the end of this year 9eight months).  Influenza vaccine received 9/30/20.  Menactra vaccine given 11/18/15.  Tdap was boosted 1/21/15.  Prevnar 13 given 10/16/13; Pneumovax given 1/21/15.  HAV / HBV immunized / seroimmune.  HCV negative 6/1/09.  Negative rectal Pap done by Dr. Sanchez 6/28/10.  Antitreponemal antibody positive since 8/27/09 but repeat RPR was again negative on 6/12/20.  Quantiferon Gold was negative 11/12/15.  A screening hemoglobin A1C (because he has had several slightly elevated random glucoses over the past several years) was normal at 5.6% on 6/14/19.  Sees  a dentist annually.  Sees a sleep apnea specialist for Cpap.  Saw an ophthalmologist in early 2012.  Had a  non-[facility] dermatologist appointment for a complete skin check in early 2019 and was told he had only chronic lentiga without any concerning lesions.  Underwent a routine screening colonoscopy at [facility] in [place], [place], in 1/19 which was normal (without polyps, per the patient) except for diverticulosis  -- encouraged to intake more fiber.

## 2021-09-04 ENCOUNTER — HEALTH MAINTENANCE LETTER (OUTPATIENT)
Age: 56
End: 2021-09-04

## 2021-10-07 ENCOUNTER — PRE VISIT (OUTPATIENT)
Dept: UROLOGY | Facility: CLINIC | Age: 56
End: 2021-10-07

## 2021-10-08 NOTE — TELEPHONE ENCOUNTER
MEDICAL RECORDS REQUEST   [facility]  Urology Clinic  [address] [place] [address]  PHONE: [phone]  Fax: [phone]        FUTURE VISIT INFORMATION                                                   Dennys Bee, : 1965 scheduled for future visit at [facility]    APPOINTMENT INFORMATION:    Date: 10/22/2021    Provider:  Giovana Emery CNP    Reason for Visit/Diagnosis: Benign prostatic hyperplasia with nocturia    REFERRAL INFORMATION:    Referring provider:  Rc Latham MD    Specialty: INFECTIOUS DISEASE    Referring providers clinic:   INFECTIOUS DISEASE    Clinic contact number: N/A    RECORDS REQUESTED FOR VISIT                                                     NOTES  STATUS/DETAILS   OFFICE NOTE from referring provider  yes, 2021   OFFICE NOTE from other specialist  no   DISCHARGE SUMMARY from hospital  no   DISCHARGE REPORT from the ER  no   OPERATIVE REPORT  no   MEDICATION LIST  yes   LABS     URINALYSIS (UA)  Yes, 10/18/2017   URINE CYTOLOGY  no     PRE-VISIT CHECKLIST      Record collection complete If no, please explain in process   Appointment appropriately scheduled           (right time/right provider) Yes   Joint diagnostic appointment coordinated correctly          (ensure right order & amount of time) Yes   MyChart activation Yes   Questionnaire complete If no, please explain pending

## 2021-10-15 ENCOUNTER — PRE VISIT (OUTPATIENT)
Dept: UROLOGY | Facility: CLINIC | Age: 56
End: 2021-10-15

## 2021-10-15 NOTE — TELEPHONE ENCOUNTER
Reason for visit: consult for BPH     Dx/Hx/Sx: BPH, nocturia, Peyronie's     Records/imaging/labs/orders: in epic     At Rooming: video visit

## 2021-10-22 ENCOUNTER — VIRTUAL VISIT (OUTPATIENT)
Dept: UROLOGY | Facility: CLINIC | Age: 56
End: 2021-10-22
Attending: INTERNAL MEDICINE
Payer: COMMERCIAL

## 2021-10-22 DIAGNOSIS — N52.9 ERECTILE DYSFUNCTION, UNSPECIFIED ERECTILE DYSFUNCTION TYPE: Primary | ICD-10-CM

## 2021-10-22 DIAGNOSIS — R35.1 BENIGN PROSTATIC HYPERPLASIA WITH NOCTURIA: ICD-10-CM

## 2021-10-22 DIAGNOSIS — N40.1 BENIGN PROSTATIC HYPERPLASIA WITH NOCTURIA: ICD-10-CM

## 2021-10-22 PROCEDURE — 99203 OFFICE O/P NEW LOW 30 MIN: CPT | Mod: 95 | Performed by: NURSE PRACTITIONER

## 2021-10-22 ASSESSMENT — PATIENT HEALTH QUESTIONNAIRE - PHQ9: SUM OF ALL RESPONSES TO PHQ QUESTIONS 1-9: 17

## 2021-10-22 NOTE — PATIENT INSTRUCTIONS
UROLOGY CLINIC VISIT PATIENT INSTRUCTIONS    -Schedule a lab visit to obtain PSA blood draw.  -Schedule in-person clinic visit with Dr. Irwin to discuss ED treatment options and complete uroflow with post-void residual scan.     If you have any issues, questions or concerns in the meantime, do not hesitate to contact us at [phone] or via [employer].     It was a pleasure meeting with you today.  Thank you for allowing me and my team the privilege of caring for you today.  YOU are the reason we are here, and I truly hope we provided you with the excellent service you deserve.  Please let us know if there is anything else we can do for you so that we can be sure you are leaving completely satisfied with your care experience.    Giovana Emery, CNP

## 2021-10-22 NOTE — NURSING NOTE
Depression Response    Patient completed the PHQ-9 assessment for depression and scored >9? Yes  Question 9 on the PHQ-9 was positive for suicidality? No  Does patient have current mental health provider? Yes    Is this a virtual visit? Yes   Does patient have suicidal ideation (positive question 9)? No - offer to place Mental Health Referral.  Patient declined referral/not needed    I personally notified the following: visit provider

## 2021-10-22 NOTE — LETTER
10/22/2021       RE: Dennys Bee  [address] [facility] [address]     Dear Colleague,    Thank you for referring your patient, Dennys Bee, to the [facility] [place] [facility]. Please see a copy of my visit note below.    Gadiel is a 56 year old who is being evaluated via a billable video visit. After several unsuccessful attempts to connect via video, the shared decision was made to convert our visit to telephone.     How would you like to obtain your AVS? MyChart    Duration of telephone call: 20 minutes       Dennys Bee complains of   Chief Complaint   Patient presents with     Consult For     BPH, frequency, peyronies, ED       Assessment/Plan:  56 year old male with a history of HIV, Peyronie's disease, ED, and sleep apnea who has been experiencing mixed LUTS, improved somewhat with Flomax. Continues to feel that he is not always emptying his bladder completely. Also with ED issues in the setting of Peyronie's history. Orals (sildenafil/tadalafil) either caused side effects or too expensive. ICI has worked. He is interested in exploring IPP as another option.   -Continue Flomax  -Will update PSA  -Follow up with Dr. Irwin to discuss ICI vs IPP for ED management. Would like to have him to do a uroflow/PVR, as well, so will arrange for this to be done at that visit when he is in the clinic.       Giovana Emery, CNP  Department of Urology      Subjective:   56 year old male with a history of HIV, Peyronie's disease, ED, and sleep apnea who presents for consult. His primary issue is urinary frequency and nocturia, with a feeling of incomplete bladder emptying. His stream is also slow. As noted, he has sleep apnea, and this has been difficult to manage. States that he tried and failed a CPAP, and also failed using a mouthpiece. He is now in the works of getting an oral implant which works by  "stimulating a nerve to help him breathe. He was started on Flomax by his PCP, who is ID, a few months ago, and feels that this has helped, but \"not 100%.\" He has a family history of prostate cancer in his uncle and his father also had prostate issues, but unclear if it was ever cancer. Last PSA was checked in 2018 and was 1.06 at that time.     As noted, he also has a history of Peyronie's that was previously managed by an outside urologist. Received verapamil penile injections in 2014. However, this urologist was apparently delicensed at some point after that. ED was treated first with sildenafil, but he experienced bothersome side effects, and was therefore switched to Cialis. This worked occasionally but then become too expensive to continue. Currently, he is able to get only a partial erection occasionally but cannot maintain it; mild curvature. He was given samples of Edex and Caverject by his last urologist, which did work. He has a friend who has an IPP and has had really good results with this. He is interested in exploring this option further.     "

## 2021-10-22 NOTE — PROGRESS NOTES
"Gadiel is a 56 year old who is being evaluated via a billable video visit. After several unsuccessful attempts to connect via video, the shared decision was made to convert our visit to telephone.     How would you like to obtain your AVS? Dougmarilu    Duration of telephone call: 20 minutes       Julianolaura Bee complains of   Chief Complaint   Patient presents with     Consult For     BPH, frequency, peyronies, ED       Assessment/Plan:  56 year old male with a history of HIV, Peyronie's disease, ED, and sleep apnea who has been experiencing mixed LUTS, improved somewhat with Flomax. Continues to feel that he is not always emptying his bladder completely. Also with ED issues in the setting of Peyronie's history. Orals (sildenafil/tadalafil) either caused side effects or too expensive. ICI has worked. He is interested in exploring IPP as another option.   -Continue Flomax  -Will update PSA  -Follow up with Dr. Irwin to discuss ICI vs IPP for ED management. Would like to have him to do a uroflow/PVR, as well, so will arrange for this to be done at that visit when he is in the clinic.       Giovana Emery, CNP  Department of Urology      Subjective:   56 year old male with a history of HIV, Peyronie's disease, ED, and sleep apnea who presents for consult. His primary issue is urinary frequency and nocturia, with a feeling of incomplete bladder emptying. His stream is also slow. As noted, he has sleep apnea, and this has been difficult to manage. States that he tried and failed a CPAP, and also failed using a mouthpiece. He is now in the works of getting an oral implant which works by stimulating a nerve to help him breathe. He was started on Flomax by his PCP, who is ID, a few months ago, and feels that this has helped, but \"not 100%.\" He has a family history of prostate cancer in his uncle and his father also had prostate issues, but unclear if it was ever cancer. Last PSA was checked in 2018 and was 1.06 at " that time.     As noted, he also has a history of Peyronie's that was previously managed by an outside urologist. Received verapamil penile injections in 2014. However, this urologist was apparently delicensed at some point after that. ED was treated first with sildenafil, but he experienced bothersome side effects, and was therefore switched to Cialis. This worked occasionally but then become too expensive to continue. Currently, he is able to get only a partial erection occasionally but cannot maintain it; mild curvature. He was given samples of Edex and Caverject by his last urologist, which did work. He has a friend who has an IPP and has had really good results with this. He is interested in exploring this option further.

## 2021-10-25 ENCOUNTER — TELEPHONE (OUTPATIENT)
Dept: UROLOGY | Facility: CLINIC | Age: 56
End: 2021-10-25

## 2021-10-25 NOTE — TELEPHONE ENCOUNTER
LVM for patient to Schedule a lab visit to obtain PSA blood draw.  -Schedule in-person clinic visit with Dr. Irwin to discuss ED treatment options and complete uroflow with post-void residual scan.

## 2021-11-10 NOTE — TELEPHONE ENCOUNTER
MEDICAL RECORDS REQUEST   [facility]  [facility] Clinic  [address] [place] [address]  PHONE: [phone]  Fax: [phone]        FUTURE VISIT INFORMATION                                                   Dennys Bee, : 1965 scheduled for future visit at [facility]    APPOINTMENT INFORMATION:    Date: 2022    Provider:  Edwar Irwin MD    Reason for Visit/Diagnosis: New ED, ref nessa Swenson pt, records in epic    REFERRAL INFORMATION:    Referring provider:  ramiro Yu    Specialty: Urology     Referring providers clinic:  [facility] contact number:      RECORDS REQUESTED FOR VISIT                                                     NOTES  STATUS/DETAILS   OFFICE NOTE from referring provider  yes   OFFICE NOTE from other specialist  no   DISCHARGE SUMMARY from hospital  no   DISCHARGE REPORT from the ER  no   OPERATIVE REPORT  no   MEDICATION LIST  yes   LABS     URINALYSIS (UA)  yes   URINE CYTOLOGY  no     PRE-VISIT CHECKLIST      Record collection complete Yes   Appointment appropriately scheduled           (right time/right provider) Yes   Joint diagnostic appointment coordinated correctly          (ensure right order & amount of time) N/a   MyChart activation Yes   Questionnaire complete If no, please explain

## 2021-12-02 NOTE — TELEPHONE ENCOUNTER
Called patient for refill reminder.    [employer] 9  prescriptions address has been verified.     Follow up: 04/02/18    Brunilda Norton, [facility]  [phone]   Nutrition/MNT Progress Report    Azucena Allison was seen from 0913 to 1000 for medical nutrition therapy for Celiac disease in a individual appointment setting. The patient was seen for 47 minutes, and billed for 45 minutes.      Barriers and Readiness to Learning:  The instruction was given to: patient  Barriers to self-care and learning limitations: patient  Preferences for Learning: no preference   Readiness to learn: The patient demonstrates the ability to understand and asks questions.  Learning needs were assessed and the patient requires education in medical nutrition therapy, physical activity and goal setting.     Material was presented using verbal, written, demonstration and return demonstration.     Highlights of today’s visit: Pt works at [employer], patient helps plan events, she gets meals from their dining program. Pt has looked at the ingredients of the dressing, has seen that the vegetables are cut and prepackaged. Pt would eat roasted veggies from work. Patient eats vegetarian (will eat eggs). Patient reports she does not eat dairy very often. Patient states that she has noticed that cooked onions can cause GI upset and her \"running\" to the bathroom, but that raw onions typically do not cause her to feel the same way.  At home patient feels like she has to urgently go to the bathroom despite living in a GF environment. Patient's toaster is GF dedicated. Patient reports she has had stomach issues since she was a child but started having more issues since her senior year in high school.   Patient reports that when she goes to her parent's house to eat, her mother tries to be very cautious with the foods that are cooked for patient.  Education provided on low FODMAP diet and elimination/reintroduction of these foods.     Assessment:  Food and Nutrition Related History:   Oral fluids: coffee & water  Type of food/meals: home cooked & at NML  Meal pattern: 3 meals and 2-3 snacks per day  Wake  up: 0500, sleep: 2130  Breakfast (1000)  Rice rolls OR trail mix   Coffee  water Lunch (3478-9517)  Free lunch at work: salad (veggies, beans, egg, balsamic vinaigrette), banana, peanut butter (sealed container)      Dinner (7455-1641)  Rice/pasta/potato, veggie burger   Snack   Snack   Fruit snacks  1600--Veggies & dip OR toast w/ avocado & fruit        Snack  Popcorn, ice cream bar   Location: home or out to eat (1-2x/mo)  Main support person/people: mother    Physical activity: dance classes, yoga    Anthropometric Measurements:  Estimated body mass index is 21.26 kg/m² as calculated from the following:    Height as of 11/24/21: 5' 3\" (1.6 m).    Weight as of 11/24/21: 54.4 kg (120 lb).     Wt Readings from Last 4 Encounters:   11/24/21 54.4 kg (120 lb)   11/04/21 52 kg (114 lb 10.2 oz)   02/23/21 55 kg (121 lb 4.1 oz) (37 %, Z= -0.34)*   02/05/21 54 kg (119 lb) (32 %, Z= -0.46)*     * Growth percentiles are based on CDC (Girls, 2-20 Years) data.     Biochemical Data, Medical Tests, and Procedures:  No results found for: HGBA1C   Glucose (mg/dL)   Date Value   01/18/2021 90     No results found for: CHOLESTEROL No results found for: HDL No components found for: LDL No results found for: TRIGLYCERIDE No results found for: CALCLDL No results found for: MICROALBUMIN    Nutrition Diagnosis:  Altered GI function  related to Celiac Disease as evidenced by diagnosis, continued GI upset.    Nutrition Prescription:  Low FODMAP meal plan, eliminating one carbohydrate set at a time, starting with dairy foods (eliminate dairy for 4 weeks, with re-introduction on December 30)  Keep food record via written record or nutrition phone teresa    Intervention:  Nutrition Education:  Nutrition Education - Content (1)  Focused on:   Purpose of the nutrition education E-1.1:Celiac Disease .    Nutrition Education - Application (2)   Skill development in the following areas E-2.2: Nutrition - Effect of FODMAP foods on GI  function    Nutrition Counseling:  Theoretical Basis/Approach (1)  Cognitive-Behavioral Theory  Utilizing: Motivational interviewing, Goal setting and Self-monitoring.    Print/Written Resources Provided:   FODMAP 101  AVS    Monitoring and Evaluation:  Patient demonstrated intermediate level of knowledge in above areas of healthy food selection and preparation ability by verbalizing food choices to fit the nutrition prescription.    Plan to evaluate: Type of food/meals: via record or recall, goal of \"I will eliminate dairy foods for 4 weeks\" at least 75% of the time .    Goal Setting to Promote Health & Problem Solving for Daily Living was discussed.  Patient selected goal of: I will eliminate dairy foods for 4 weeks  Patient achieved goal N/a% of the time as was identified as a new goal.    Recommended Follow-up:  Follow-up appointment 1/6/2022.  The patient was encouraged to call back if any questions or concerns.    See Patient Instructions for further information.  Thank you for your referral. Please contact me with any question/concerns.    MNT referral expires on 12/31/21.  The visit report was sent to the referring provider.  The MD referral is located in EMR.

## 2021-12-30 ENCOUNTER — PRE VISIT (OUTPATIENT)
Dept: UROLOGY | Facility: CLINIC | Age: 56
End: 2021-12-30
Payer: COMMERCIAL

## 2021-12-30 NOTE — TELEPHONE ENCOUNTER
Reason for visit: Consult     Relevant information: erectile dysfunction    Records/imaging/labs/orders: in EPIC    Pt called: no    At Rooming: Uroflow/PVR

## 2022-01-04 DIAGNOSIS — E03.9 ACQUIRED HYPOTHYROIDISM: ICD-10-CM

## 2022-01-04 DIAGNOSIS — Z21 HUMAN IMMUNODEFICIENCY VIRUS I INFECTION (H): ICD-10-CM

## 2022-01-04 DIAGNOSIS — E78.2 MIXED HYPERLIPIDEMIA: ICD-10-CM

## 2022-01-04 DIAGNOSIS — I10 ESSENTIAL HYPERTENSION: ICD-10-CM

## 2022-01-04 DIAGNOSIS — N40.1 BENIGN PROSTATIC HYPERPLASIA WITH NOCTURIA: ICD-10-CM

## 2022-01-04 DIAGNOSIS — R35.1 BENIGN PROSTATIC HYPERPLASIA WITH NOCTURIA: ICD-10-CM

## 2022-01-04 RX ORDER — ENALAPRIL MALEATE 20 MG/1
TABLET ORAL
Qty: 180 TABLET | Refills: 2 | Status: SHIPPED | OUTPATIENT
Start: 2022-01-04 | End: 2022-08-21

## 2022-01-04 RX ORDER — PRAVASTATIN SODIUM 10 MG
TABLET ORAL
Qty: 90 TABLET | Refills: 2 | Status: SHIPPED | OUTPATIENT
Start: 2022-01-04 | End: 2022-08-17

## 2022-01-04 RX ORDER — TAMSULOSIN HYDROCHLORIDE 0.4 MG/1
0.4 CAPSULE ORAL EVERY EVENING
Qty: 30 CAPSULE | Refills: 2 | Status: SHIPPED | OUTPATIENT
Start: 2022-01-04 | End: 2022-02-17

## 2022-01-04 RX ORDER — AMLODIPINE BESYLATE 10 MG/1
TABLET ORAL
Qty: 90 TABLET | Refills: 2 | Status: SHIPPED | OUTPATIENT
Start: 2022-01-04 | End: 2022-08-21

## 2022-01-04 RX ORDER — LEVOTHYROXINE SODIUM 100 UG/1
TABLET ORAL
Qty: 180 TABLET | Refills: 2 | Status: SHIPPED | OUTPATIENT
Start: 2022-01-04 | End: 2022-08-17 | Stop reason: DRUGHIGH

## 2022-01-04 RX ORDER — FENOFIBRATE 54 MG/1
TABLET ORAL
Qty: 180 TABLET | Refills: 2 | Status: SHIPPED | OUTPATIENT
Start: 2022-01-04 | End: 2022-08-21

## 2022-01-04 RX ORDER — BICTEGRAVIR SODIUM, EMTRICITABINE, AND TENOFOVIR ALAFENAMIDE FUMARATE 50; 200; 25 MG/1; MG/1; MG/1
TABLET ORAL
Qty: 90 TABLET | Refills: 2 | Status: SHIPPED | OUTPATIENT
Start: 2022-01-04 | End: 2022-08-21

## 2022-01-18 ENCOUNTER — LAB (OUTPATIENT)
Dept: LAB | Facility: CLINIC | Age: 57
End: 2022-01-18
Payer: COMMERCIAL

## 2022-01-18 DIAGNOSIS — R35.1 BENIGN PROSTATIC HYPERPLASIA WITH NOCTURIA: ICD-10-CM

## 2022-01-18 DIAGNOSIS — N40.1 BENIGN PROSTATIC HYPERPLASIA WITH NOCTURIA: ICD-10-CM

## 2022-01-18 LAB — PSA SERPL-MCNC: 5.39 UG/L (ref 0–4)

## 2022-01-18 PROCEDURE — 84153 ASSAY OF PSA TOTAL: CPT | Performed by: PATHOLOGY

## 2022-01-18 PROCEDURE — 36415 COLL VENOUS BLD VENIPUNCTURE: CPT | Performed by: PATHOLOGY

## 2022-01-19 DIAGNOSIS — R97.20 ELEVATED PROSTATE SPECIFIC ANTIGEN (PSA): ICD-10-CM

## 2022-01-19 DIAGNOSIS — R35.1 NOCTURIA: Primary | ICD-10-CM

## 2022-01-21 ENCOUNTER — PRE VISIT (OUTPATIENT)
Dept: UROLOGY | Facility: CLINIC | Age: 57
End: 2022-01-21

## 2022-01-21 ENCOUNTER — OFFICE VISIT (OUTPATIENT)
Dept: UROLOGY | Facility: CLINIC | Age: 57
End: 2022-01-21
Payer: COMMERCIAL

## 2022-01-21 VITALS
HEART RATE: 83 BPM | BODY MASS INDEX: 23.55 KG/M2 | SYSTOLIC BLOOD PRESSURE: 130 MMHG | HEIGHT: 69 IN | DIASTOLIC BLOOD PRESSURE: 86 MMHG | WEIGHT: 159 LBS

## 2022-01-21 DIAGNOSIS — R35.0 URINARY FREQUENCY: ICD-10-CM

## 2022-01-21 DIAGNOSIS — Z12.5 SCREENING FOR PROSTATE CANCER: ICD-10-CM

## 2022-01-21 DIAGNOSIS — R97.20 ELEVATED PROSTATE SPECIFIC ANTIGEN (PSA): Primary | ICD-10-CM

## 2022-01-21 DIAGNOSIS — R35.1 NOCTURIA: ICD-10-CM

## 2022-01-21 LAB
ALBUMIN UR-MCNC: 30 MG/DL
APPEARANCE UR: ABNORMAL
BILIRUB UR QL STRIP: NEGATIVE
COLOR UR AUTO: YELLOW
GLUCOSE UR STRIP-MCNC: NEGATIVE MG/DL
HGB UR QL STRIP: NEGATIVE
KETONES UR STRIP-MCNC: NEGATIVE MG/DL
LEUKOCYTE ESTERASE UR QL STRIP: ABNORMAL
MUCOUS THREADS #/AREA URNS LPF: PRESENT /LPF
NITRATE UR QL: NEGATIVE
PH UR STRIP: 5 [PH] (ref 5–7)
RBC URINE: 2 /HPF
SP GR UR STRIP: 1.01 (ref 1–1.03)
TRANSITIONAL EPI: <1 /HPF
UROBILINOGEN UR STRIP-MCNC: NORMAL MG/DL
WBC URINE: 4 /HPF

## 2022-01-21 PROCEDURE — 99214 OFFICE O/P EST MOD 30 MIN: CPT | Mod: 25 | Performed by: UROLOGY

## 2022-01-21 PROCEDURE — 51798 US URINE CAPACITY MEASURE: CPT | Performed by: UROLOGY

## 2022-01-21 PROCEDURE — 81001 URINALYSIS AUTO W/SCOPE: CPT | Performed by: PATHOLOGY

## 2022-01-21 RX ORDER — HYDROXYZINE HYDROCHLORIDE 25 MG/1
TABLET, FILM COATED ORAL
COMMUNITY
Start: 2022-01-04

## 2022-01-21 ASSESSMENT — MIFFLIN-ST. JEOR: SCORE: 1551.6

## 2022-01-21 ASSESSMENT — PAIN SCALES - GENERAL: PAINLEVEL: NO PAIN (0)

## 2022-01-21 NOTE — PROGRESS NOTES
I am seeing Dennys Bee in consult  for evaluation of erectile dysfunction and lower urinary tract symptoms.     HPI:  Dennys Bee is a 56 year old male is a very nice man with complaints of erectile dysfunction for the last several years.    History of erectile dysfunction and Peyronie's disease.  Has tried PDE5i and intracavernosal injections in the past.  ICI worked well, alprostadil. Had a hard time doing needles.  He has left curvature to the erection.  He had ?verapamil injections into the scar - Dr. Severino (also had him on TRT).  Curve is not bad enough to preclude intercourse, he felt.    ED/Vascular disease risk factors:  HTN:   No  Hyperlipidemia: yes  Smoking: yes, smoker   DM: no  Cardiovascular disease: None known  Meds associated with ED that he's taking: BP medications, likely  Anxiety/anger/depression: yes, treated.  Penile Plaques or curvature: yes, but sounds not signficant.     We discussed IPP as an option.        On TRT gel topically daily.    History of elevated screening PSA recently.    Also history of BPH/ lower urinary tract symptoms- nocturia every 2 hours or so at night.    Last PSA was checked in 2018 and was 1.06 at that time.     He recently started on tamsulosin.  Flow rate is poor, h  Lower Urinary Tract Symptoms, at present:    Frequency: q2hrs, more than he wants    Urgency: yes, but no urge urinary incontinence.    Nocturia - sleep troubles, voids at night because he wakes from sleep disorder.  3x/night void    Weakness of stream: Flow rate is poor, longstanding.    Noting post-void dribbling.    Intermittency: yes    Straining: no    Emptying:  not feeling empty     Hematuria or dysuria: none       REVIEW OF SYSTEMS:  General: negative  Skin: negative  Eyes: negative  Ears/Nose/Throat: negative  Respiratory: negative  Cardiovascular: negative  Gastrointestinal: negative  Genitourinary: see HPI  Musculoskeletal: negative  Neurologic: negative  Psychiatric:  negative  Hematologic/Lymphatic/Immunologic: negative  Endocrine: negative    PAST MEDICAL HX:  Past Medical History:   Diagnosis Date     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  Negative Pap smears since 2009.     Chronic kidney disease      Depression, anxiety      Human immunodeficiency virus (HIV) disease (H) Diagnosed 7/93.    First treated with AZT / 3TC / Crixivan; then on AZT / efavirenz;on Combivir/Sustiva since 5/03.     Hyperlipidemia      Hypertension      Hypotestosteronism     On weekly replacement therapy through his urologist.     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     Syphilis, latent 5/08.    Treated with three weeks of benzathin penicillin.       PAST SURG HX:  Past Surgical History:   Procedure Laterality Date     APPENDECTOMY OPEN  Summer 2007.    For acute appendicitis with rupture.     CHOLECYSTECTOMY  2007.        FAMILY HX:  Family History   Problem Relation Age of Onset     Depression Father      Hyperlipidemia Father      Depression Other         Multiple family members.     Heart Disease Maternal Grandmother      Myocardial Infarction Maternal Grandfather         Sudden MI / death at age 51.     Hyperlipidemia Maternal Grandfather      Obesity Paternal Grandmother      Hyperlipidemia Other         Multiple family members (Parents, brother, sister, PGM)       SOCIAL HX:  Social History     Tobacco Use     Smoking status: Current Every Day Smoker     Packs/day: 1.00     Types: Cigarettes     Smokeless tobacco: Never Used   Substance Use Topics     Alcohol use: Yes     Drug use: None       MEDICATIONS:  Current Outpatient Medications   Medication Sig     amLODIPine (NORVASC) 10 MG tablet TAKE ONE TABLET BY MOUTH ONCE DAILY     aspirin 81 MG tablet Take 1 tablet by mouth daily.     BIKTARVY -25 MG per tablet TAKE ONE TABLET BY MOUTH ONCE DAILY     buPROPion (WELLBUTRIN XL) 150 MG 24 hr tablet 150 mg  "daily Patient takes 3 tabs daily for total 450mg     enalapril (VASOTEC) 20 MG tablet TAKE ONE TABLET BY MOUTH TWICE A DAY     fenofibrate (LOFIBRA) 54 MG tablet TAKE TWO TABLETS BY MOUTH ONCE DAILY     hydrOXYzine (ATARAX) 25 MG tablet      lamoTRIgine (LAMICTAL) 200 MG tablet      levothyroxine (SYNTHROID/LEVOTHROID) 100 MCG tablet TAKE TWO TABLETS BY MOUTH ONCE DAILY     multivitamin, therapeutic with minerals (MULTI-VITAMIN) TABS Take 1 tablet by mouth daily     pravastatin (PRAVACHOL) 10 MG tablet TAKE ONE TABLET BY MOUTH ONCE DAILY     tamsulosin (FLOMAX) 0.4 MG capsule TAKE 1 CAPSULE (0.4 MG) BY MOUTH EVERY EVENING     testosterone (AXIRON) 30 MG/ACT topical solution Place 1 pump (30 mg) onto the skin daily     No current facility-administered medications for this visit.       ALLERGIES:  Dye [contrast dye] and Iodine i 131 tositumomab      GENERAL PHYSICAL EXAM:     /86   Pulse 83   Ht 1.753 m (5' 9\")   Wt 72.1 kg (159 lb)   BMI 23.48 kg/m     Constitutional: No acute distress. Well nourished.   PSYCH: normal mood and affect.  NEURO: normal gait, no focal deficits.   EYES: anicteric, EOMI, PERR.  ENT: neck supple, no lymphadenopathy, mucosae moist, no thrush.  CARDIOPULMONARY: breathing non-labored, pulse regular rate/rhythm, no peripheral edema.  GI: Abdomen soft, non-tender, nondistended  MUSCULOSKELETAL: normal limb proportions, no muscle wasting, no contractures.  SKIN: Normal virilized hair distribution, no lesions, warts or rashes over genitalia, abdomen extremities or face.  HEME/LYMPH: no ecchymosis, no lymphadenopathy in groin or neck, no lymphedema.     EXAM:  Phallus  circumcised, meatus adequate, no plaques palpated.   Left testis descended , size is normal  , consistency is normal . No intra-testicular masses.   Right testis descended , size is normal  , consistency is normal . No intra-testicular masses.   Epididymes present, non-tender, not-enlarged.   Cord structures not " remarkable.     Prostate exam: 20g, anodular, nontender.    Imaging/labs:  Lab Results   Component Value Date    CR 1.13 08/13/2021    CR 1.19 12/11/2020    CR 1.00 06/12/2020    CR 1.05 12/13/2019     Lab Results   Component Value Date    PSA 5.39 01/18/2022    PSA 1.06 04/13/2018    PSA 0.50 05/14/2008       Abdomen MRI 8/27/2009:    ASSESSMENT / PLAN:    Orders Placed This Encounter     hydrOXYzine (ATARAX) 25 MG tablet         Elevated PSA ( acute issue)    Options for the elevated PSA are:     1. Observation.    2. Prostate biopsy in urology clinic.    3. Prostate MRI ( which will help decide on prostate biopsy or not, and can be used to more accurately guide the prostate biopsy, if needed).          He would like to repeat PSA in a few months        - Erectile Dysfunction (ED): discussed options including VCD, ICI, MUSE, PDE5, and IPP with Mr. Bee.  (chronic stable issue)     He will observe for right now.  Want to pursue bladder symptoms right now.       BPH. LUTS ((chronic stable issue with exacerbation)    AUAss today = 27/3    PVR today = 35cc by bladder scan.    Continue Flomax, try increasing to BID.    Return to clinic for office cystoscopy.      Edwar Irwin MD     Urological Surgeon     Additional Coding Information:    Problems:  4 -- one or more chronic illnesses with exacerbation or side effects    Data Reviewed  Review of the result(s) of each unique test - > 3 studies reviewed, as listed above    Tests ordered/pending: PVR, UA, PSA repeat    Notes from other providers reviewed: N/A       Level of risk:F  4 -- prescription drug management    Time spent:  42 minutes spent on the date of the encounter doing chart review, history and exam, documentation and further activities per the note

## 2022-01-21 NOTE — PATIENT INSTRUCTIONS
Please get a PSA in 2 months.     Please return to the clinic for an office cystoscopy with Dr. Irwin at your convenience.    It was a pleasure meeting with you today.  Thank you for allowing me and my team the privilege of caring for you today.  YOU are the reason we are here, and I truly hope we provided you with the excellent service you deserve.  Please let us know if there is anything else we can do for you so that we can be sure you are leaving completely satisfied with your care experience.

## 2022-01-21 NOTE — LETTER
1/21/2022       RE: Dennys Bee  [address] [facility] [address]     Dear Colleague,    Thank you for referring your patient, Dennys Bee, to the [facility] UROLOGY CLINIC [place] at [facility]. Please see a copy of my visit note below.    I am seeing Dennys Bee in consult  for evaluation of erectile dysfunction and lower urinary tract symptoms.     HPI:  Dennys Bee is a 56 year old male is a very nice man with complaints of erectile dysfunction for the last several years.    History of erectile dysfunction and Peyronie's disease.  Has tried PDE5i and intracavernosal injections in the past.  ICI worked well, alprostadil. Had a hard time doing needles.  He has left curvature to the erection.  He had ?verapamil injections into the scar - Dr. Severino (also had him on TRT).  Curve is not bad enough to preclude intercourse, he felt.    ED/Vascular disease risk factors:  HTN:   No  Hyperlipidemia: yes  Smoking: yes, smoker   DM: no  Cardiovascular disease: None known  Meds associated with ED that he's taking: BP medications, likely  Anxiety/anger/depression: yes, treated.  Penile Plaques or curvature: yes, but sounds not signficant.     We discussed IPP as an option.        On TRT gel topically daily.    History of elevated screening PSA recently.    Also history of BPH/ lower urinary tract symptoms- nocturia every 2 hours or so at night.    Last PSA was checked in 2018 and was 1.06 at that time.     He recently started on tamsulosin.  Flow rate is poor, h  Lower Urinary Tract Symptoms, at present:    Frequency: q2hrs, more than he wants    Urgency: yes, but no urge urinary incontinence.    Nocturia - sleep troubles, voids at night because he wakes from sleep disorder.  3x/night void    Weakness of stream: Flow rate is poor, longstanding.    Noting post-void dribbling.    Intermittency: yes    Straining:  no    Emptying:  not feeling empty     Hematuria or dysuria: none       REVIEW OF SYSTEMS:  General: negative  Skin: negative  Eyes: negative  Ears/Nose/Throat: negative  Respiratory: negative  Cardiovascular: negative  Gastrointestinal: negative  Genitourinary: see HPI  Musculoskeletal: negative  Neurologic: negative  Psychiatric: negative  Hematologic/Lymphatic/Immunologic: negative  Endocrine: negative    PAST MEDICAL HX:  Past Medical History:   Diagnosis Date     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  Negative Pap smears since 2009.     Chronic kidney disease      Depression, anxiety      Human immunodeficiency virus (HIV) disease (H) Diagnosed 7/93.    First treated with AZT / 3TC / Crixivan; then on AZT / efavirenz;on Combivir/Sustiva since 5/03.     Hyperlipidemia      Hypertension      Hypotestosteronism     On weekly replacement therapy through his urologist.     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     Syphilis, latent 5/08.    Treated with three weeks of benzathin penicillin.       PAST SURG HX:  Past Surgical History:   Procedure Laterality Date     APPENDECTOMY OPEN  Summer 2007.    For acute appendicitis with rupture.     CHOLECYSTECTOMY  2007.        FAMILY HX:  Family History   Problem Relation Age of Onset     Depression Father      Hyperlipidemia Father      Depression Other         Multiple family members.     Heart Disease Maternal Grandmother      Myocardial Infarction Maternal Grandfather         Sudden MI / death at age 51.     Hyperlipidemia Maternal Grandfather      Obesity Paternal Grandmother      Hyperlipidemia Other         Multiple family members (Parents, brother, sister, PGM)       SOCIAL HX:  Social History     Tobacco Use     Smoking status: Current Every Day Smoker     Packs/day: 1.00     Types: Cigarettes     Smokeless tobacco: Never Used   Substance Use Topics     Alcohol use: Yes     Drug use:  "None       MEDICATIONS:  Current Outpatient Medications   Medication Sig     amLODIPine (NORVASC) 10 MG tablet TAKE ONE TABLET BY MOUTH ONCE DAILY     aspirin 81 MG tablet Take 1 tablet by mouth daily.     BIKTARVY -25 MG per tablet TAKE ONE TABLET BY MOUTH ONCE DAILY     buPROPion (WELLBUTRIN XL) 150 MG 24 hr tablet 150 mg daily Patient takes 3 tabs daily for total 450mg     enalapril (VASOTEC) 20 MG tablet TAKE ONE TABLET BY MOUTH TWICE A DAY     fenofibrate (LOFIBRA) 54 MG tablet TAKE TWO TABLETS BY MOUTH ONCE DAILY     hydrOXYzine (ATARAX) 25 MG tablet      lamoTRIgine (LAMICTAL) 200 MG tablet      levothyroxine (SYNTHROID/LEVOTHROID) 100 MCG tablet TAKE TWO TABLETS BY MOUTH ONCE DAILY     multivitamin, therapeutic with minerals (MULTI-VITAMIN) TABS Take 1 tablet by mouth daily     pravastatin (PRAVACHOL) 10 MG tablet TAKE ONE TABLET BY MOUTH ONCE DAILY     tamsulosin (FLOMAX) 0.4 MG capsule TAKE 1 CAPSULE (0.4 MG) BY MOUTH EVERY EVENING     testosterone (AXIRON) 30 MG/ACT topical solution Place 1 pump (30 mg) onto the skin daily     No current facility-administered medications for this visit.       ALLERGIES:  Dye [contrast dye] and Iodine i 131 tositumomab      GENERAL PHYSICAL EXAM:     /86   Pulse 83   Ht 1.753 m (5' 9\")   Wt 72.1 kg (159 lb)   BMI 23.48 kg/m     Constitutional: No acute distress. Well nourished.   PSYCH: normal mood and affect.  NEURO: normal gait, no focal deficits.   EYES: anicteric, EOMI, PERR.  ENT: neck supple, no lymphadenopathy, mucosae moist, no thrush.  CARDIOPULMONARY: breathing non-labored, pulse regular rate/rhythm, no peripheral edema.  GI: Abdomen soft, non-tender, nondistended  MUSCULOSKELETAL: normal limb proportions, no muscle wasting, no contractures.  SKIN: Normal virilized hair distribution, no lesions, warts or rashes over genitalia, abdomen extremities or face.  HEME/LYMPH: no ecchymosis, no lymphadenopathy in groin or neck, no lymphedema.     " EXAM:  Phallus  circumcised, meatus adequate, no plaques palpated.   Left testis descended , size is normal  , consistency is normal . No intra-testicular masses.   Right testis descended , size is normal  , consistency is normal . No intra-testicular masses.   Epididymes present, non-tender, not-enlarged.   Cord structures not remarkable.     Prostate exam: 20g, anodular, nontender.    Imaging/labs:  Lab Results   Component Value Date    CR 1.13 08/13/2021    CR 1.19 12/11/2020    CR 1.00 06/12/2020    CR 1.05 12/13/2019     Lab Results   Component Value Date    PSA 5.39 01/18/2022    PSA 1.06 04/13/2018    PSA 0.50 05/14/2008       Abdomen MRI 8/27/2009:    ASSESSMENT / PLAN:    Orders Placed This Encounter     hydrOXYzine (ATARAX) 25 MG tablet         Elevated PSA ( acute issue)    Options for the elevated PSA are:     1. Observation.    2. Prostate biopsy in urology clinic.    3. Prostate MRI ( which will help decide on prostate biopsy or not, and can be used to more accurately guide the prostate biopsy, if needed).          He would like to repeat PSA in a few months        - Erectile Dysfunction (ED): discussed options including VCD, ICI, MUSE, PDE5, and IPP with Mr. Bee.  (chronic stable issue)     He will observe for right now.  Want to pursue bladder symptoms right now.       BPH. LUTS ((chronic stable issue with exacerbation)    AUAss today = 27/3    PVR today = 35cc by bladder scan.    Continue Flomax, try increasing to BID.    Return to clinic for office cystoscopy.      Edwar Irwin MD     Urological Surgeon     Additional Coding Information:    Problems:  4 -- one or more chronic illnesses with exacerbation or side effects    Data Reviewed  Review of the result(s) of each unique test - > 3 studies reviewed, as listed above    Tests ordered/pending: PVR, UA, PSA repeat    Notes from other providers reviewed: N/A       Level of risk:F  4 -- prescription drug management    Time spent:  42 minutes  spent on the date of the encounter doing chart review, history and exam, documentation and further activities per the note

## 2022-01-21 NOTE — NURSING NOTE
Chief Complaint   Patient presents with     Consult For     referral from Giovana Emery, no information given by patient     PHQ-2 Score:     PHQ-2 ( 1999 [employer]) 1/21/2022 2/20/2020   Q1: Little interest or pleasure in doing things 3 3   Q2: Feeling down, depressed or hopeless 3 3   PHQ-2 Score 6 6   PHQ-2 Total Score (12-17 Years)- Positive if 3 or more points; Administer PHQ-A if positive - 6     Patient declined PHQ-9, has a depression treatment plan and didn't feel he was a harm to himself or others.     - Gena HUIZAR, EMT  Urology Clinic

## 2022-01-23 NOTE — RESULT ENCOUNTER NOTE
Dear Gadiel,     Here are your recent results.   There were some minor changes in the urine dipstick but no evidence of increased red or white blood cells on the microscopic exam of the urine.  The main findings are that there is no evidence of micro-hematuria or urinary infection.    The protein and trace leukocyte esterase I think is may be false positives.    Please let us know if you have any questions,    Tuan MARTIN

## 2022-02-01 ENCOUNTER — PRE VISIT (OUTPATIENT)
Dept: UROLOGY | Facility: CLINIC | Age: 57
End: 2022-02-01
Payer: COMMERCIAL

## 2022-02-01 NOTE — TELEPHONE ENCOUNTER
Reason for visit: Cystoscopy     Relevant information: BPH with LUTS    Records/imaging/labs/orders: in EPIC    Pt called: no    At Rooming: normal

## 2022-02-17 DIAGNOSIS — R35.1 BENIGN PROSTATIC HYPERPLASIA WITH NOCTURIA: ICD-10-CM

## 2022-02-17 DIAGNOSIS — N40.1 BENIGN PROSTATIC HYPERPLASIA WITH NOCTURIA: ICD-10-CM

## 2022-02-17 RX ORDER — TAMSULOSIN HYDROCHLORIDE 0.4 MG/1
0.8 CAPSULE ORAL EVERY EVENING
Qty: 180 CAPSULE | Refills: 0 | Status: SHIPPED | OUTPATIENT
Start: 2022-02-17 | End: 2022-06-07

## 2022-02-17 NOTE — TELEPHONE ENCOUNTER
tamsulosin (FLOMAX) 0.4 MG capsule  Last Written Prescription Date: 1/4/2022  Last Fill Quantity: 30,   # refills: 2  Last Office Visit :  1/21/2022  Future Office visit:  Nonje    Routing refill request to provider for review/approval because:  Pt asking for a new script.  But mentioned the dose needs to be doubled.   Please advise and send new order.       Alexandria Petit RN  Central Triage Red Flags/Med Refills

## 2022-02-17 NOTE — TELEPHONE ENCOUNTER
[facility] Call Center    Phone Message    May a detailed message be left on voicemail: yes     Reason for Call: Medication Refill Request    Has the patient contacted the pharmacy for the refill? Yes   Name of medication being requested: tamsulosin (FLOMAX) 0.4 MG capsule  Provider who prescribed the medication: Dc  Pharmacy:    [place], [place] - [address] [place] [address] [phone]  Date medication is needed: ASAP - The patient had to cancel his 2/25/22 cystoscopy due to insurance issues. He is wondering if he can get a new prescription sent to his pharmacy. He stated Dr. Irwin said to double the dose. Please advise. Thank you.     Action Taken: Message routed to:  [facility] ([facility] Urology    Travel Screening: Not Applicable

## 2022-02-17 NOTE — TELEPHONE ENCOUNTER
Spoke to pt. Pt has been taking flomax 0.4 mg 2 capsules since last visit with Dr. Irwin. Pt reports he was instructed to double the dose. Pt is not sure if it is helping or not, but would like to continue. Only taking double dose for a little over a week so far, but is running out. Pt confirmed [facility]. Chart and problems list reviewed.      Elvira Landry RN MSN

## 2022-03-22 ENCOUNTER — LAB (OUTPATIENT)
Dept: LAB | Facility: CLINIC | Age: 57
End: 2022-03-22
Payer: COMMERCIAL

## 2022-03-22 DIAGNOSIS — Z12.5 SCREENING FOR PROSTATE CANCER: ICD-10-CM

## 2022-03-22 DIAGNOSIS — R97.20 ELEVATED PROSTATE SPECIFIC ANTIGEN (PSA): ICD-10-CM

## 2022-03-22 PROCEDURE — 84154 ASSAY OF PSA FREE: CPT | Mod: 90 | Performed by: PATHOLOGY

## 2022-03-22 PROCEDURE — 36415 COLL VENOUS BLD VENIPUNCTURE: CPT | Performed by: PATHOLOGY

## 2022-03-22 PROCEDURE — 84153 ASSAY OF PSA TOTAL: CPT | Mod: 90 | Performed by: PATHOLOGY

## 2022-03-22 PROCEDURE — 99000 SPECIMEN HANDLING OFFICE-LAB: CPT | Performed by: PATHOLOGY

## 2022-03-23 LAB
PSA FREE MFR SERPL: 12 %
PSA FREE SERPL-MCNC: 0.3 NG/ML
PSA SERPL IA-MCNC: 2.5 NG/ML

## 2022-03-25 NOTE — RESULT ENCOUNTER NOTE
Dear Gadiel,     Here are your recent results.   Total PSA is back to normal, 2.5.  I don't think you need a prostate biopsy at this time.  I recommend a recheck of PSA in a year.    Please let us know if you have any questions or concerns.     Tuan MARTIN

## 2022-05-10 ENCOUNTER — PRE VISIT (OUTPATIENT)
Dept: UROLOGY | Facility: CLINIC | Age: 57
End: 2022-05-10
Payer: COMMERCIAL

## 2022-05-10 NOTE — TELEPHONE ENCOUNTER
Reason for visit: Cystoscopy     Relevant information: BPH; LUTS    Records/imaging/labs/orders: in EPIC    Pt called: no    At Rooming: [place]

## 2022-06-03 ENCOUNTER — OFFICE VISIT (OUTPATIENT)
Dept: UROLOGY | Facility: CLINIC | Age: 57
End: 2022-06-03
Payer: COMMERCIAL

## 2022-06-03 VITALS
BODY MASS INDEX: 23.7 KG/M2 | HEART RATE: 96 BPM | HEIGHT: 69 IN | WEIGHT: 160 LBS | DIASTOLIC BLOOD PRESSURE: 77 MMHG | SYSTOLIC BLOOD PRESSURE: 129 MMHG

## 2022-06-03 DIAGNOSIS — N40.1 BENIGN PROSTATIC HYPERPLASIA WITH NOCTURIA: Primary | ICD-10-CM

## 2022-06-03 DIAGNOSIS — R35.1 BENIGN PROSTATIC HYPERPLASIA WITH NOCTURIA: Primary | ICD-10-CM

## 2022-06-03 DIAGNOSIS — Z12.5 SCREENING FOR PROSTATE CANCER: ICD-10-CM

## 2022-06-03 PROCEDURE — 52000 CYSTOURETHROSCOPY: CPT | Performed by: UROLOGY

## 2022-06-03 RX ORDER — FENOFIBRATE 54 MG/1
162 TABLET ORAL
COMMUNITY
End: 2022-08-12

## 2022-06-03 RX ORDER — ENALAPRIL MALEATE 10 MG/1
10 TABLET ORAL
COMMUNITY
End: 2022-08-17

## 2022-06-03 RX ORDER — TADALAFIL 20 MG/1
20 TABLET ORAL DAILY PRN
Qty: 30 TABLET | Refills: 11 | Status: SHIPPED | OUTPATIENT
Start: 2022-06-03 | End: 2023-06-22

## 2022-06-03 RX ORDER — LIDOCAINE HYDROCHLORIDE 20 MG/ML
JELLY TOPICAL ONCE
Status: ACTIVE | OUTPATIENT
Start: 2022-06-03

## 2022-06-03 RX ORDER — LEVOTHYROXINE SODIUM 50 UG/1
250 TABLET ORAL
COMMUNITY
End: 2022-08-17

## 2022-06-03 RX ORDER — TRAZODONE HYDROCHLORIDE 50 MG/1
TABLET, FILM COATED ORAL
COMMUNITY
Start: 2022-03-31

## 2022-06-03 RX ORDER — LAMOTRIGINE 200 MG/1
200 TABLET ORAL
COMMUNITY
End: 2023-02-01

## 2022-06-03 ASSESSMENT — PAIN SCALES - GENERAL: PAINLEVEL: NO PAIN (0)

## 2022-06-03 NOTE — PATIENT INSTRUCTIONS
"Please schedule a 1-year follow-up with Dr. Irwin with a PSA prior.    It was a pleasure meeting with you today.  Thank you for allowing me and my team the privilege of caring for you today.  YOU are the reason we are here, and I truly hope we provided you with the excellent service you deserve.  Please let us know if there is anything else we can do for you so that we can be sure you are leaving completely satisfied with your care experience.      AFTER YOUR CYSTOSCOPY        You have just completed a cystoscopy, or \"cysto\", which allowed your physician to learn more about your bladder (or to remove a stent placed after surgery). We suggest that you continue to avoid caffeine, fruit juice, and alcohol for the next 24 hours, however, you are encouraged to return to your normal activities.         A few things that are considered normal after your cystoscopy:     * Small amount of bleeding (or spotting) that clears within the next 24 hours     * Slight burning sensation with urination     * Sensation to of needing to avoid more frequently     * The feeling of \"air\" in your urine     * Mild discomfort that is relieved with Tylenol        Please contact our office promptly if you:     * Develop a fever above 101 degrees     * Are unable to urinate     * Develop bright red blood that does not stop     * Severe pain or swelling         Please contact our office with any concerns or questions @[phone]   AFTER YOUR CYSTOSCOPY        You have just completed a cystoscopy, or \"cysto\", which allowed your physician to learn more about your bladder (or to remove a stent placed after surgery). We suggest that you continue to avoid caffeine, fruit juice, and alcohol for the next 24 hours, however, you are encouraged to return to your normal activities.         A few things that are considered normal after your cystoscopy:     * Small amount of bleeding (or spotting) that clears within the next 24 hours     * Slight burning " "sensation with urination     * Sensation to of needing to avoid more frequently     * The feeling of \"air\" in your urine     * Mild discomfort that is relieved with Tylenol        Please contact our office promptly if you:     * Develop a fever above 101 degrees     * Are unable to urinate     * Develop bright red blood that does not stop     * Severe pain or swelling         Please contact our office with any concerns or questions @[facility].  "

## 2022-06-03 NOTE — LETTER
6/3/2022       RE: Dennys Bee  [address]  [facility] [address]     Dear Colleague,    Thank you for referring your patient, Dennys Bee, to the [facility] UROLOGY CLINIC [place] at [facility]. Please see a copy of my visit note below.    Pre-procedure diagnosis:  BPH, lower urinary tract symptoms.  Post-procedure diagnosis: Mild BPH.  Procedure performed:  Cystourethroscopy  Surgeon:    Tuan MARTIN   Anesthesia:    Local    Indications for procedure:   Dennys Bee is a 56 year old male with a history of bothersome lower urinary tract symptoms.  Here for office cystoscopy to check bladder and prostate, urethra.    History of elevated PSA, normal on recheck.      Component      Latest Ref Rng & Units 3/22/2022   Prostate Specific Antigen, Free      ng/mL 0.3   Prostate Specific Antigen      0.0 - 4.0 ng/mL 2.5   Prostate Specific Antigen, Percent Free      % 12       Lab Results   Component Value Date    PSA 5.39 01/18/2022    PSA 1.06 04/13/2018    PSA 0.50 05/14/2008          Description of procedure:   After fully informed, voluntary consent was obtained, the patient was brought into the procedure room, identified and placed in a supine position on the cysto table.  The groin/scrotum were prepped and draped in a sterile fashion with betadine.  A 15F flexible cystoscope was inserted into the urethra and the bladder and urethra examined in a systematic manner.  The patient tolerated the procedure well and there were no complications.      Cystoscopic findings:  The urethra was normal without strictures.  The prostate was 3.5cm long and demonstrated minimal hypertrophy.  There was no significant median lobe. Mild intravesical extension.  The bladder was completely surveyed.  There was no significant trabeculation.  There were no tumors, stones, or diverticula identifed.  The ureteric orifices were normal in position and  number and effluxing clear urine.    Assessment/Plan:   Dennys Bee is a 56 year old male with a history of BPH symptoms, now with overall only small prostate enlargement findings on cystoscopy.  Bothersome lower urinary tract symptoms.    Discussed limiting fluids in the evenings.  Continue Flomax.  ED- refill Cialis.  Recommended urodynamic studies if symptoms worsen with time.  He will work on behavior change, rather than add an anticholinergic.  Return to clinic 1 year with psa, sooner if needed.      Edwar Irwin MD

## 2022-06-03 NOTE — NURSING NOTE
"Chief Complaint   Patient presents with     Cystoscopy     BPH w/ LUTS       Blood pressure 129/77, pulse 96, height 1.753 m (5' 9\"), weight 72.6 kg (160 lb). Body mass index is 23.63 kg/m .    Patient Active Problem List   Diagnosis     Need for prophylactic vaccination and inoculation against influenza     Human immunodeficiency virus I infection (H)     Depressive disorder, not elsewhere classified     Psoriatic arthritis (H)     Hypothyroidism     Hypertriglyceridemia     Essential hypertension     TATY (obstructive sleep apnea)     Mixed hyperlipidemia     Benign prostatic hyperplasia with nocturia       Allergies   Allergen Reactions     Diagnostic X-Ray Materials Anaphylaxis     Dye [Contrast Dye]      Iodine I 131 Tositumomab        Current Outpatient Medications   Medication Sig Dispense Refill     amLODIPine (NORVASC) 10 MG tablet TAKE ONE TABLET BY MOUTH ONCE DAILY 90 tablet 2     BIKTARVY -25 MG per tablet TAKE ONE TABLET BY MOUTH ONCE DAILY 90 tablet 2     buPROPion (WELLBUTRIN XL) 150 MG 24 hr tablet 150 mg daily Patient takes 3 tabs daily for total 450mg       enalapril (VASOTEC) 20 MG tablet TAKE ONE TABLET BY MOUTH TWICE A  tablet 2     fenofibrate (LOFIBRA) 54 MG tablet Take 162 mg by mouth       fenofibrate (LOFIBRA) 54 MG tablet TAKE TWO TABLETS BY MOUTH ONCE DAILY 180 tablet 2     hydrOXYzine (ATARAX) 25 MG tablet        lamoTRIgine (LAMICTAL) 200 MG tablet        levothyroxine (SYNTHROID/LEVOTHROID) 100 MCG tablet TAKE TWO TABLETS BY MOUTH ONCE DAILY 180 tablet 2     multivitamin w/minerals (THERA-VIT-M) tablet Take 1 tablet by mouth daily       pravastatin (PRAVACHOL) 10 MG tablet TAKE ONE TABLET BY MOUTH ONCE DAILY 90 tablet 2     tamsulosin (FLOMAX) 0.4 MG capsule Take 2 capsules (0.8 mg) by mouth every evening 180 capsule 0     testosterone (AXIRON) 30 MG/ACT topical solution Place 1 pump (30 mg) onto the skin daily 270 mL 5     traZODone (DESYREL) 50 MG tablet        aspirin 81 " MG tablet Take 1 tablet by mouth daily. (Patient not taking: Reported on 6/3/2022)       enalapril (VASOTEC) 10 MG tablet Take 10 mg by mouth       lamoTRIgine (LAMICTAL) 200 MG tablet Take 200 mg by mouth       levothyroxine (SYNTHROID/LEVOTHROID) 50 MCG tablet Take 250 mcg by mouth         Social History     Tobacco Use     Smoking status: Current Every Day Smoker     Packs/day: 1.00     Types: Cigarettes     Smokeless tobacco: Never Used   Substance Use Topics     Alcohol use: Yes       Invasive Procedure Safety Checklist:    Procedure: Cystoscopy    Action: Complete sections and checkboxes as appropriate.    Pre-procedure:  1. Patient ID Verified with 2 identifiers (Mary and  or MRN) : YES    2. Procedure and site verified with patient/designee (when able) : YES    3. Accurate consent documentation in medical record : YES    4. H&P (or appropriate assessment) documented in medical record : N/A  H&P must be up to 30 days prior to procedure an updated within 24 hours of                 Procedure as applicable.     5. Relevant diagnostic and radiology test results appropriately labeled and displayed as applicable : YES    6. Blood products, implants, devices, and/or special equipment available for the procedure as applicable : YES    7. Procedure site(s) marked with provider initials [Exclusions: none] : NO    8. Marking not required. Reason : Yes  Procedure does not require site marking    Time Out:     Time-Out performed immediately prior to starting procedure, including verbal and active participation of all team members addressing: YES    1. Correct patient identity.  2. Confirmed that the correct side and site are marked.  3. An accurate procedure to be done.  4. Agreement on the procedure to be done.  5. Correct patient position.  6. Relevant images and results are properly labeled and appropriately displayed.  7. The need to administer antibiotics or fluids for irrigation purposes during the procedure as  applicable.  8. Safety precautions based on patient history or medication use.    During Procedure: Verification of correct person, site, and procedure occurs any time the responsibility for care of the patient is transferred to another member of the care team.    The following medication was given:     MEDICATION:  Lidocaine without epinephrine 2% jelly  ROUTE: urethral   SITE: urethral   DOSE: 10 mL  LOT #: [id number]  : [employer]  EXPIRATION DATE: 11-23  NDC#: [address]-1   Was there drug waste? No    Prior to med admin, verified patient identity using patient's name and date of birth.  Due to med administration, patient instructed to remain in clinic for 15 minutes  afterwards, and to report any adverse reaction to me immediately.    Drug Amount Wasted:  None.  Vial/Syringe: HAVEN Hayes  6/3/2022  11:08 AM

## 2022-06-04 DIAGNOSIS — R35.1 BENIGN PROSTATIC HYPERPLASIA WITH NOCTURIA: ICD-10-CM

## 2022-06-04 DIAGNOSIS — N40.1 BENIGN PROSTATIC HYPERPLASIA WITH NOCTURIA: ICD-10-CM

## 2022-06-04 NOTE — PROGRESS NOTES
Pre-procedure diagnosis:  BPH, lower urinary tract symptoms.  Post-procedure diagnosis: Mild BPH.  Procedure performed:  Cystourethroscopy  Surgeon:    Tuan MARTIN   Anesthesia:    Local    Indications for procedure:   Dennys Bee is a 56 year old male with a history of bothersome lower urinary tract symptoms.  Here for office cystoscopy to check bladder and prostate, urethra.    History of elevated PSA, normal on recheck.      Component      Latest Ref Rng & Units 3/22/2022   Prostate Specific Antigen, Free      ng/mL 0.3   Prostate Specific Antigen      0.0 - 4.0 ng/mL 2.5   Prostate Specific Antigen, Percent Free      % 12       Lab Results   Component Value Date    PSA 5.39 01/18/2022    PSA 1.06 04/13/2018    PSA 0.50 05/14/2008          Description of procedure:   After fully informed, voluntary consent was obtained, the patient was brought into the procedure room, identified and placed in a supine position on the cysto table.  The groin/scrotum were prepped and draped in a sterile fashion with betadine.  A 15F flexible cystoscope was inserted into the urethra and the bladder and urethra examined in a systematic manner.  The patient tolerated the procedure well and there were no complications.      Cystoscopic findings:  The urethra was normal without strictures.  The prostate was 3.5cm long and demonstrated minimal hypertrophy.  There was no significant median lobe. Mild intravesical extension.  The bladder was completely surveyed.  There was no significant trabeculation.  There were no tumors, stones, or diverticula identifed.  The ureteric orifices were normal in position and number and effluxing clear urine.    Assessment/Plan:   Dennys Bee is a 56 year old male with a history of BPH symptoms, now with overall only small prostate enlargement findings on cystoscopy.  Bothersome lower urinary tract symptoms.    Discussed limiting fluids in the evenings.  Continue Flomax.  ED- refill  Cialis.  Recommended urodynamic studies if symptoms worsen with time.  He will work on behavior change, rather than add an anticholinergic.  Return to clinic 1 year with psa, sooner if needed.

## 2022-06-07 RX ORDER — TAMSULOSIN HYDROCHLORIDE 0.4 MG/1
0.8 CAPSULE ORAL EVERY EVENING
Qty: 180 CAPSULE | Refills: 1 | Status: SHIPPED | OUTPATIENT
Start: 2022-06-07 | End: 2023-01-17

## 2022-06-07 NOTE — TELEPHONE ENCOUNTER
tamsulosin (FLOMAX) 0.4 MG capsule  Last Written Prescription Date:  2/17/22  Last Fill Quantity: 180,   # refills: 0  Last Office Visit : 6/3/22  Future Office visit: Return to clinic 1 year with psa, sooner if needed        Overridden by Edwar Irwin MD on Abdirashid 3, 2022 11:49 AM   Drug-Drug   1. PHOSPHODIESTERASE TYPE 5 INHIBITORS / ALPHA BLOCKERS [Level: Moderate] [Reason: Will monitor drug levels/drug effects ]   Other Orders: tadalafil (CIALIS) 20 MG tablet

## 2022-08-12 ENCOUNTER — LAB (OUTPATIENT)
Dept: LAB | Facility: CLINIC | Age: 57
End: 2022-08-12
Payer: COMMERCIAL

## 2022-08-12 DIAGNOSIS — Z21 HUMAN IMMUNODEFICIENCY VIRUS I INFECTION (H): ICD-10-CM

## 2022-08-12 DIAGNOSIS — E34.9 HYPOTESTOSTERONISM: ICD-10-CM

## 2022-08-12 DIAGNOSIS — R73.9 ELEVATED SERUM GLUCOSE: Primary | ICD-10-CM

## 2022-08-12 DIAGNOSIS — R73.9 ELEVATED SERUM GLUCOSE: ICD-10-CM

## 2022-08-12 DIAGNOSIS — E03.9 ACQUIRED HYPOTHYROIDISM: ICD-10-CM

## 2022-08-12 DIAGNOSIS — E78.2 MIXED HYPERLIPIDEMIA: ICD-10-CM

## 2022-08-12 LAB
ALBUMIN SERPL-MCNC: 4.1 G/DL (ref 3.4–5)
ALP SERPL-CCNC: 117 U/L (ref 40–150)
ALT SERPL W P-5'-P-CCNC: 39 U/L (ref 0–70)
ANION GAP SERPL CALCULATED.3IONS-SCNC: 3 MMOL/L (ref 3–14)
AST SERPL W P-5'-P-CCNC: 16 U/L (ref 0–45)
BASOPHILS # BLD AUTO: 0.1 10E3/UL (ref 0–0.2)
BASOPHILS NFR BLD AUTO: 1 %
BILIRUB SERPL-MCNC: 0.6 MG/DL (ref 0.2–1.3)
BUN SERPL-MCNC: 18 MG/DL (ref 7–30)
CALCIUM SERPL-MCNC: 9.7 MG/DL (ref 8.5–10.1)
CD3 CELLS # BLD: 2512 CELLS/UL (ref 603–2990)
CD3 CELLS NFR BLD: 90 % (ref 49–84)
CD3+CD4+ CELLS # BLD: 1034 CELLS/UL (ref 441–2156)
CD3+CD4+ CELLS NFR BLD: 37 % (ref 28–63)
CD3+CD4+ CELLS/CD3+CD8+ CLL BLD: 0.65 % (ref 1.4–2.6)
CD3+CD8+ CELLS # BLD: 1597 CELLS/UL (ref 125–1312)
CD3+CD8+ CELLS NFR BLD: 57 % (ref 10–40)
CHLORIDE BLD-SCNC: 105 MMOL/L (ref 94–109)
CHOLEST SERPL-MCNC: 222 MG/DL
CO2 SERPL-SCNC: 24 MMOL/L (ref 20–32)
CREAT SERPL-MCNC: 1.03 MG/DL (ref 0.66–1.25)
EOSINOPHIL # BLD AUTO: 0.3 10E3/UL (ref 0–0.7)
EOSINOPHIL NFR BLD AUTO: 3 %
ERYTHROCYTE [DISTWIDTH] IN BLOOD BY AUTOMATED COUNT: 12.9 % (ref 10–15)
FASTING STATUS PATIENT QL REPORTED: YES
GFR SERPL CREATININE-BSD FRML MDRD: 86 ML/MIN/1.73M2
GLUCOSE BLD-MCNC: 117 MG/DL (ref 70–99)
HCT VFR BLD AUTO: 42.4 % (ref 40–53)
HDLC SERPL-MCNC: 48 MG/DL
HGB BLD-MCNC: 14.6 G/DL (ref 13.3–17.7)
IMM GRANULOCYTES # BLD: 0 10E3/UL
IMM GRANULOCYTES NFR BLD: 0 %
LDLC SERPL CALC-MCNC: 99 MG/DL
LIPASE SERPL-CCNC: 147 U/L (ref 73–393)
LYMPHOCYTES # BLD AUTO: 3 10E3/UL (ref 0.8–5.3)
LYMPHOCYTES NFR BLD AUTO: 30 %
MCH RBC QN AUTO: 30.7 PG (ref 26.5–33)
MCHC RBC AUTO-ENTMCNC: 34.4 G/DL (ref 31.5–36.5)
MCV RBC AUTO: 89 FL (ref 78–100)
MONOCYTES # BLD AUTO: 0.6 10E3/UL (ref 0–1.3)
MONOCYTES NFR BLD AUTO: 6 %
NEUTROPHILS # BLD AUTO: 6.1 10E3/UL (ref 1.6–8.3)
NEUTROPHILS NFR BLD AUTO: 60 %
NONHDLC SERPL-MCNC: 174 MG/DL
NRBC # BLD AUTO: 0 10E3/UL
NRBC BLD AUTO-RTO: 0 /100
PLATELET # BLD AUTO: 328 10E3/UL (ref 150–450)
POTASSIUM BLD-SCNC: 4.2 MMOL/L (ref 3.4–5.3)
PROT SERPL-MCNC: 7.9 G/DL (ref 6.8–8.8)
RBC # BLD AUTO: 4.76 10E6/UL (ref 4.4–5.9)
SODIUM SERPL-SCNC: 132 MMOL/L (ref 133–144)
T CELL COMMENT: ABNORMAL
TRIGL SERPL-MCNC: 375 MG/DL
TSH SERPL DL<=0.005 MIU/L-ACNC: 0.14 MU/L (ref 0.4–4)
WBC # BLD AUTO: 10.1 10E3/UL (ref 4–11)

## 2022-08-12 PROCEDURE — 87536 HIV-1 QUANT&REVRSE TRNSCRPJ: CPT | Performed by: INTERNAL MEDICINE

## 2022-08-12 PROCEDURE — 80061 LIPID PANEL: CPT | Performed by: PATHOLOGY

## 2022-08-12 PROCEDURE — 80053 COMPREHEN METABOLIC PANEL: CPT | Performed by: PATHOLOGY

## 2022-08-12 PROCEDURE — 84403 ASSAY OF TOTAL TESTOSTERONE: CPT | Performed by: INTERNAL MEDICINE

## 2022-08-12 PROCEDURE — 83690 ASSAY OF LIPASE: CPT | Performed by: PATHOLOGY

## 2022-08-12 PROCEDURE — 83036 HEMOGLOBIN GLYCOSYLATED A1C: CPT | Performed by: PATHOLOGY

## 2022-08-12 PROCEDURE — 84443 ASSAY THYROID STIM HORMONE: CPT | Performed by: PATHOLOGY

## 2022-08-12 PROCEDURE — 86360 T CELL ABSOLUTE COUNT/RATIO: CPT | Performed by: INTERNAL MEDICINE

## 2022-08-12 PROCEDURE — 36415 COLL VENOUS BLD VENIPUNCTURE: CPT | Performed by: PATHOLOGY

## 2022-08-12 PROCEDURE — 85025 COMPLETE CBC W/AUTO DIFF WBC: CPT | Performed by: PATHOLOGY

## 2022-08-15 LAB — HBA1C MFR BLD: 5.7 % (ref 0–5.6)

## 2022-08-16 LAB
HIV1 RNA # PLAS NAA DL=20: NOT DETECTED COPIES/ML
TESTOST SERPL-MCNC: 452 NG/DL (ref 240–950)

## 2022-08-17 ENCOUNTER — OFFICE VISIT (OUTPATIENT)
Dept: INFECTIOUS DISEASES | Facility: CLINIC | Age: 57
End: 2022-08-17
Attending: INTERNAL MEDICINE
Payer: COMMERCIAL

## 2022-08-17 VITALS
HEART RATE: 96 BPM | TEMPERATURE: 98.6 F | SYSTOLIC BLOOD PRESSURE: 119 MMHG | WEIGHT: 164.9 LBS | BODY MASS INDEX: 24.42 KG/M2 | DIASTOLIC BLOOD PRESSURE: 73 MMHG | HEIGHT: 69 IN | OXYGEN SATURATION: 98 %

## 2022-08-17 DIAGNOSIS — Z20.89 CONTACT WITH AND (SUSPECTED) EXPOSURE TO OTHER COMMUNICABLE DISEASES: ICD-10-CM

## 2022-08-17 DIAGNOSIS — G47.33 OBSTRUCTIVE SLEEP APNEA: ICD-10-CM

## 2022-08-17 DIAGNOSIS — Z21 HUMAN IMMUNODEFICIENCY VIRUS I INFECTION (H): Primary | ICD-10-CM

## 2022-08-17 DIAGNOSIS — E03.9 ACQUIRED HYPOTHYROIDISM: ICD-10-CM

## 2022-08-17 DIAGNOSIS — I10 ESSENTIAL HYPERTENSION: ICD-10-CM

## 2022-08-17 DIAGNOSIS — E34.9 HYPOTESTOSTERONISM: ICD-10-CM

## 2022-08-17 DIAGNOSIS — Z23 NEED FOR VACCINATION FOR ZOSTER: ICD-10-CM

## 2022-08-17 DIAGNOSIS — E78.2 MIXED HYPERLIPIDEMIA: ICD-10-CM

## 2022-08-17 PROCEDURE — 250N000011 HC RX IP 250 OP 636: Performed by: INTERNAL MEDICINE

## 2022-08-17 PROCEDURE — G0463 HOSPITAL OUTPT CLINIC VISIT: HCPCS | Mod: 25

## 2022-08-17 PROCEDURE — 90472 IMMUNIZATION ADMIN EACH ADD: CPT | Performed by: INTERNAL MEDICINE

## 2022-08-17 PROCEDURE — 99215 OFFICE O/P EST HI 40 MIN: CPT | Performed by: INTERNAL MEDICINE

## 2022-08-17 PROCEDURE — 250N000021 HC RX MED A9270 GY (STAT IND- M) 250: Performed by: INTERNAL MEDICINE

## 2022-08-17 PROCEDURE — 90471 IMMUNIZATION ADMIN: CPT | Performed by: INTERNAL MEDICINE

## 2022-08-17 PROCEDURE — 90750 HZV VACC RECOMBINANT IM: CPT | Performed by: INTERNAL MEDICINE

## 2022-08-17 PROCEDURE — 90611 SMALLPOX&MONKEYPOX VAC 0.5ML: CPT | Performed by: INTERNAL MEDICINE

## 2022-08-17 RX ORDER — TESTOSTERONE 30 MG/1.5ML
1 SOLUTION TOPICAL DAILY
Qty: 270 ML | Refills: 5 | Status: SHIPPED | OUTPATIENT
Start: 2022-08-17 | End: 2023-02-22

## 2022-08-17 RX ORDER — LEVOTHYROXINE SODIUM 100 UG/1
100 TABLET ORAL DAILY
Qty: 30 TABLET | Refills: 11 | Status: SHIPPED | OUTPATIENT
Start: 2022-08-17 | End: 2023-02-18

## 2022-08-17 RX ORDER — PRAVASTATIN SODIUM 20 MG
20 TABLET ORAL DAILY
Qty: 90 TABLET | Refills: 3 | Status: SHIPPED | OUTPATIENT
Start: 2022-08-17 | End: 2023-02-18

## 2022-08-17 RX ADMIN — RABIES VACCINE 0.5 ML: KIT at 15:12

## 2022-08-17 RX ADMIN — ZOSTER VACCINE RECOMBINANT, ADJUVANTED 0.5 ML: KIT at 16:04

## 2022-08-17 ASSESSMENT — PAIN SCALES - GENERAL: PAINLEVEL: NO PAIN (0)

## 2022-08-17 NOTE — LETTER
8/17/2022       RE: Dennys Bee  [address] [facility] [address]     Dear Colleague,    Thank you for referring your patient, Dennys Bee, to the [facility] [place] at [facility]. Please see a copy of my visit note below.      Division of Infectious Diseases and International Medicine  Department of Medicine        [facility] OUTPATIENT VISIT NOTE [place] Mail Code [phone] [facility] [place] [address]  Office: [phone]  Fax:  [phone]     HISTORY OF PRESENT ILLNESS:    Gadiel Bee is a 56 year old gentleman with asymptomatic HIV infection who returns to [facility] today to follow-up his antiretroviral therapy with Biktarvy one tablet PO daily (switched to simplify dosing regimen in 8/20 from Combivir one tablet PO BID plus nevirapine 200 mg PO BID which were started in 5/03), along with management of other chronic health issues.  He rarely misses a Biktarvy dose and notices no drug side effect.  We reviewed his HIV, drug toxicity, and other monitoring lab tests drawn on 8/12/22.  He has continue to avoid Covid-19 infection and has received four Moderna Covid-19 vaccination doses on 4/11/21, 5/9/21, 11/28/21, and 4/5/22.  He decreased his fenofibrate dose from three to two 54 mg tablets each evening in early 2021 and now recently stopped the Tricor altogether about 1.5 months ago (as a trial to determine if he still needs it).  Unfortunately, the  8/12/22 triglycerides were up to 375.  His total cholesterol was also increased to 222 (although LDL was 99) on ongoing low-dose pravastatin 10 mg PO daily.  He is willing to try increasing the pravastatin dose since he has tolerated it without any myalgias or other side effects at the current low dose.  His TSH was low at 0.14 for the second year in a row on 8/12/22, so we will decrease his Synthroid dose from the  present 200 mcg PO daily.  He continues to have difficulty sleeping due to sleep apnea and none of the several therapies for that have worked over past years (including Cpap), so he has located a physician in [place] who would be able to place an Inspire implant.  He went for a pre-operative examination in [place] and the provider doing the pre-op evaluation flagged his low TSH as being a reason to delay the surgery.  He says that he wakes at night every two hours or less due to the TATY and feels tired in the daytimes a lot.  He believes the poor sleep likely is contributing to his persistent intractable depression which Dr. Johnston, his [place] psychiatrist, has been unable to fully control.  In the past year, his depression has been quite marked a substantial amount of time, such that a number of days he barely is motivated to get out of bed at all.  He has not been doing much aerobic exercise at all -- he finds it hard to get motivated to use his home exercise bike or to go out for a walk.  He has continued to mostly socially isolate.  He remains on amlodipine and enalapril for hypertension with well-controlled recent blood pressure (today 119/73).  He is now on tamsulosin 0.8 mg daily for BPH manifested by nocturia and some mild daytime urinary frequency.  He continues to use Axiron topically for hypotestosteronism.  He would like to start a Shingrix vaccine series today.  Beyond all these issues, his health seems stable and he lacks additional complaints today including no recent febrile or EENT symptoms, cough, dyspnea, chest pain, GI or  symptoms, or neurological symptoms.    PAST MEDICAL HISTORY:    1. HIV, diagnosed 7/93, previously treated with AZT, 3TC and Crixivan. He was then on AZT plus efavirenz, but in 5/03 began Combivir/Sustiva and has remained virologically suppressed on this regimen since.  2. Stable chronic stage 1 kidney disease:  Nephrology Clinic evaluation (including negative MRI scan)  unremarkable in 2009.  3. Hyperlipidemia previously requiring multiple medications, more hypertriglyceridemia than hypercholesterolemia -- now on fenofibrate plus Pravachol which was added 4/18/18.   4. Hyperthyroidism on levothyroxine.   5. History of difficult-to-treat anxiety and depression -- followed for a number of years by psychiatrist Dr. Abebe Johnston in [place].   6. Hypertension treated with Vasotec.   7. Acute appendicitis with rupture in the summer of 2007.  8. Cholecystectomy in the summer of 2007.   9. 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  Followed there annually.   10. 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  Previously received Sculptra.   11. History of a low testosterone level, most recently checked in 1/09 with a normal free testosterone of 14.0 and a total testosterone in the normal range of 482, managed somewhat unorthodoxly by a now-retired Urologist until 7/13.  Since then, has continued taking biweekly home exogenous testosterone IM injections and has tapered them only marginally from ~ weekly to ~ every other week (or a bit less frequent).  Total testosterone level on 5/14/14 was 2926.  12. Syphilis diagnosed 5/08, peak titer of 1:256, completed a treatment course of IM penicillin times 3, last dose in 6/08.  13. Prior rectal warts, treated with Aldara cream.  14. Infected lower left cracked mandibular molar tooth, repaired 6/10.  15. Unintentional overdose of alcohol, hydrocodone, and clonazepam for which hospitalized in [place] in 10/12.  16. Mild intermittent controlled extensor limb psoriasis.  17. Sleep apnea, diagnosed elsewhere.    Past Surgical History:   Procedure Laterality Date     APPENDECTOMY OPEN  Summer 2007.    For acute appendicitis with rupture.     CHOLECYSTECTOMY  2007.      ALLERGIES:  Penicillin and iodine.    CURRENT MEDICATIONS:  Current Outpatient Medications   Medication Sig Dispense Refill     amLODIPine (NORVASC) 10 MG tablet TAKE ONE TABLET BY MOUTH ONCE DAILY 90 tablet 2     BIKTARVY -25 MG per tablet TAKE ONE TABLET BY MOUTH ONCE DAILY 90 tablet 2     buPROPion (WELLBUTRIN XL) 150 MG 24 hr tablet 150 mg daily Patient takes 3 tabs daily for total 450mg       enalapril (VASOTEC) 10 MG tablet Take 10 mg by mouth       enalapril (VASOTEC) 20 MG tablet TAKE ONE TABLET BY MOUTH TWICE A  tablet 2     fenofibrate (LOFIBRA) 54 MG tablet TAKE TWO TABLETS BY MOUTH ONCE DAILY 180 tablet 2     hydrOXYzine (ATARAX) 25 MG tablet        lamoTRIgine (LAMICTAL) 200 MG tablet Take 200 mg by mouth       levothyroxine (SYNTHROID/LEVOTHROID) 100 MCG tablet Take 1 tablet (100 mcg) by mouth daily 30 tablet 11     levothyroxine (SYNTHROID/LEVOTHROID) 100 MCG tablet TAKE TWO TABLETS BY MOUTH ONCE DAILY 180 tablet 2     multivitamin w/minerals (THERA-VIT-M) tablet Take 1 tablet by mouth daily       pravastatin (PRAVACHOL) 20 MG tablet Take 1 tablet (20 mg) by mouth daily 90 tablet 3     tadalafil (CIALIS) 20 MG tablet Take 1 tablet (20 mg) by mouth daily as needed (take 2 hours before intercourse) 30 tablet 11     tamsulosin (FLOMAX) 0.4 MG capsule Take 2 capsules (0.8 mg) by mouth every evening 180 capsule 1     testosterone (AXIRON) 30 MG/ACT topical solution Place 1 pump (30 mg) onto the skin daily 270 mL 5     traZODone (DESYREL) 50 MG tablet        FAMILY HISTORY:  Family History   Problem Relation Age of Onset     Depression Father      Hyperlipidemia Father      Depression Other         Multiple family members.     Heart Disease Maternal Grandmother      Myocardial Infarction Maternal Grandfather         Sudden MI / death at age 51.     Hyperlipidemia Maternal Grandfather      Obesity Paternal Grandmother      Hyperlipidemia Other         Multiple family members (Parents,  "brother, sister, PGM)     SOCIAL HISTORY:  Lives alone in basement apartment in [place]ut spends much of his time at his parents home in [facility] in the town he grew up in and where all his siblings still live.  Unemployed, on disability based on his depression diagnosis.  Sexually active, no single steady partner, mostly with other HIV seropositive men, but always discloses his status when with HIV seronegative partners and uses protection.  In the past, has visited friends in [place] for an extended stay.  Tobacco:  Long-standing, up to 2.5 ppd in the past.  Presently 1 ppd (but smokes only half of each cigarette).  Has quit for up to three weeks several times in the past; not interested in further attempts at reduction at present.  Has a treadmill in the basement of his parents home that he can use for indoor exercise during cold weather, although he does so infrequently.  His father was diagnosed with cancer in winter 2019-20.    REVIEW OF SYSTEMS:  As per HPI.    PHYSICAL EXAMINATION:  General:  A pleasant, conversant, WDWN 56-year-old man in no discomfort.  Vital Signs:  /73   Pulse 96   Temp 98.6  F (37  C) (Oral)   Ht 1.753 m (5' 9\")   Wt 74.8 kg (164 lb 14.4 oz)   SpO2 98%   BMI 24.35 kg/m   (weight increased about five pounds since 1/2022).  Skin:  No acute rash or lesion.  Scattered chronic keratoses.  Head/Neck:  NCAT.  Old stable bilateral mild buccal facial lipoatrophy.  External auditory canals are patent bilaterally without discharge.  PERRL. EOMI.  Anicteric xclerae.  Conjunctivae are pink and without injection.  Oropharynx contains no erythema, exudate or lesion.  Dentition appears normal.  Neck is supple without lymphadenopathy or thyromegaly.  Lungs:  Bilaterally clear to auscultation.  CV:  RRR, normal S1, S2 without gallop, murmur or rub.  Abdomen:  Bowel sounds normal, soft, nontender, no hepatosplenomegaly, no mass, old surgical scar unchanged.  Back:  No lumbar " or CVA tenderness.  Extremities:  Distally warm, no edema.  Neurological:  Alert, oriented, memory / cognition / affect intact.  Gait is normal.    LABORATORY STUDIES (Reviewed with the patient during his appointment today):      HIV-1 RNA copies/mL 08/12/2022 Not Detected  Not Detected Copies/mL Final     CD3% Total T Cells 08/12/2022 90 (A) 49 - 84 % Final     Absolute CD3, Total T Cells 08/12/2022 2,512  603 - 2,990 cells/uL Final     CD4% Lane T Cells 08/12/2022 37  28 - 63 % Final     Absolute CD4, Lane T Cells 08/12/2022 1,034  441 - 2,156 cells/uL Final     CD8% Suppressor T Cells 08/12/2022 57 (A) 10 - 40 % Final     Absolute CD8, Suppressor T Cells 08/12/2022 1,597 (A) 125 - 1,312 cells/uL Final     CD4:CD8 Ratio 08/12/2022 0.65 (A) 1.40 - 2.60 Final     Sodium 08/12/2022 132 (A) 133 - 144 mmol/L Final     Potassium 08/12/2022 4.2  3.4 - 5.3 mmol/L Final     Chloride 08/12/2022 105  94 - 109 mmol/L Final     Carbon Dioxide (CO2) 08/12/2022 24  20 - 32 mmol/L Final     Anion Gap 08/12/2022 3  3 - 14 mmol/L Final     Urea Nitrogen 08/12/2022 18  7 - 30 mg/dL Final     Creatinine 08/12/2022 1.03  0.66 - 1.25 mg/dL Final     Calcium 08/12/2022 9.7  8.5 - 10.1 mg/dL Final     Glucose 08/12/2022 117 (A) 70 - 99 mg/dL Final     Alkaline Phosphatase 08/12/2022 117  40 - 150 U/L Final     AST 08/12/2022 16  0 - 45 U/L Final     ALT 08/12/2022 39  0 - 70 U/L Final     Protein Total 08/12/2022 7.9  6.8 - 8.8 g/dL Final     Albumin 08/12/2022 4.1  3.4 - 5.0 g/dL Final     Bilirubin Total 08/12/2022 0.6  0.2 - 1.3 mg/dL Final     GFR Estimate 08/12/2022 86  >60 mL/min/1.73m2 Final    Effective December 21, 2021 eGFRcr in adults is calculated using the 2021 CKD-EPI creatinine equation which includes age and gender (Michel et al., NEJM, DOI: 10.1056/ZSWCss1498335)     Lipase 08/12/2022 147  73 - 393 U/L Final     Cholesterol 08/12/2022 222 (A) <200 mg/dL Final     Triglycerides 08/12/2022 375 (A) <150 mg/dL Final      Direct Measure HDL 08/12/2022 48  >=40 mg/dL Final     LDL Cholesterol Calculated 08/12/2022 99  <=100 mg/dL Final     Non HDL Cholesterol 08/12/2022 174 (A) <130 mg/dL Final     Patient Fasting > 8hrs? 08/12/2022 Yes   Final     TSH 08/12/2022 0.14 (A) 0.40 - 4.00 mU/L Final     Testosterone Total 08/12/2022 452  240 - 950 ng/dL Final     WBC Count 08/12/2022 10.1  4.0 - 11.0 10e3/uL Final     RBC Count 08/12/2022 4.76  4.40 - 5.90 10e6/uL Final     Hemoglobin 08/12/2022 14.6  13.3 - 17.7 g/dL Final     Hematocrit 08/12/2022 42.4  40.0 - 53.0 % Final     MCV 08/12/2022 89  78 - 100 fL Final     MCH 08/12/2022 30.7  26.5 - 33.0 pg Final     MCHC 08/12/2022 34.4  31.5 - 36.5 g/dL Final     RDW 08/12/2022 12.9  10.0 - 15.0 % Final     Platelet Count 08/12/2022 328  150 - 450 10e3/uL Final     % Neutrophils 08/12/2022 60  % Final     % Lymphocytes 08/12/2022 30  % Final     % Monocytes 08/12/2022 6  % Final     % Eosinophils 08/12/2022 3  % Final     % Basophils 08/12/2022 1  % Final     % Immature Granulocytes 08/12/2022 0  % Final     NRBCs per 100 WBC 08/12/2022 0  <1 /100 Final     Absolute Neutrophils 08/12/2022 6.1  1.6 - 8.3 10e3/uL Final     Absolute Lymphocytes 08/12/2022 3.0  0.8 - 5.3 10e3/uL Final     Absolute Monocytes 08/12/2022 0.6  0.0 - 1.3 10e3/uL Final     Absolute Eosinophils 08/12/2022 0.3  0.0 - 0.7 10e3/uL Final     Absolute Basophils 08/12/2022 0.1  0.0 - 0.2 10e3/uL Final     Absolute Immature Granulocytes 08/12/2022 0.0  <=0.4 10e3/uL Final     Absolute NRBCs 08/12/2022 0.0  10e3/uL Final     Hemoglobin A1C 08/12/2022 5.7 (A) 0.0 - 5.6 % Final    Normal <5.7%   Prediabetes 5.7-6.4%    Diabetes 6.5% or higher     Note: Adopted from ADA consensus guidelines.     ASSESSMENT/PLAN:    1. Well-controlled, asymptomatic HIV infection:  He continues to take and tolerate Biktarvy (switched in 6/20 from long-standing Combivir / nevirapine to Biktarvy) with a stably normal absolute CD4+ cell count and an  undetectable HIV plasma viral load.  He will remain on Biktarvy and return to [facility] for follow-up in six months (with pre-labs before that visit, ordered today), or as needed before then.  2. Increased mixed hyperlipidemia:  Off fenofibrate for the past 1.5 months or so (as a trial to determine if he really needed it) his 8/12/22 triglycerides level has jumped up to 375 and his total cholesterol is also a bit higher.  He will resume taking Tricor 108 mg (two 54 mg tablets) PO daily and we will also increase his low pravastatin dose from 10 mg to 20 mg daily, since, so far, he has tolerated the Pravachol very well without any of the side effect he previously experienced with a different statin long ago.  He will phone if he notices new side effects, and we will recheck the lipid profile again in six months.  3. Hypothyroidism on levothyroxine:  For the past two annual checks, his TSH level has been below the lower limit of assay normal on long-standing 200 mcg daily.  We discussed that having a low TSH should not present any clinical problem or barrier to undergoing a surgery or procedure (such as his planned implantation of the Inspire device), but that level does suggest he is on more levothyroxine than he needs.  So, we will now decrease his dose to 100 mcg (one tablet) per day and recheck a TSH in six months.  He plans to discuss the specifics of why a low TSH might be some sort of contraindication to a surgical procedure with his [place] providers -- I told him he can ask them to contact me if they have questions or continue to believe this is a concern.  4. Obstructive sleep apnea:  As above, he is hoping to have an Inspire device implanted by a physician in [place], because CPap and other therapies to reduce his sleep apnea have been unsuccessful.  I told him I am not aware of any contraindication to undergoing that surgery.  5. Nocturia / presumed benign prostatism / history of Peyronie's  disease:  He has had  symptoms consistent with BPH and had palpable prostatic enlargement on 10/18/17 digital rectal exam with a normal PSA screen at that time, so has now been on Flomax 0.8 mg PO q evening (since 6/22 through Urology Clinic) with, apparently some improvement, since the nocturia and daytime frequency were not complained of today.  He was seen by Dr. Irwin in Urology clinic on 6/3/22 and underwent cystoscopy.  6. Well-controlled essential hypertension:  On ongoing amlodipine 10 mg PO daily (increased 5/17/17, started 5/20/15) and enalapril 20 mg PO BID (started 11/29/11 and dose increased 1/27/15) his clinic check-in blood pressure today (and at Urology Clinic in 6/22) is good.  Will continue the present therapy.  As usual, I again encouraged frequent aerobic exercise.  7. Testosterone replacement therapy / previous erectile dysfunction:  His most recent total testosterone level on 8/12/22 remains normal at 452 on ongoing Axiron transcutaneous testosterone topical replacement therapy, initially at two (30 mg) pumps (= 60 mg) daily (since late 1/15), but now at one pump daily since mid-2019.  Will continue the present dose.  8. 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  This was another reason he switched off zidovudine in summer 2020, although there has not been any evident progression of his lipodystrophy for a number of years.  9. Chronic, intractable depression / anxiety / insomnia / agoraphobia:  Managed by Dr. Johnston, his psychiatrist in the [facility] in [place], who he sees often.  10. History of prior, mild, mid-winter vitamin D deficiency:  He self-takes OTC vitamin D supplements during some white but not yet this year, although whether he even needs that is questionable, because his 10/12/18 vitamin D level was adequate at 20.  11. Mild psoriasis, presently in  remission:  Well-controlled with topical Clobex prn.  Shsom mot discussed today.  12. Health Care Maintenance:  Has received four Covid-19 (Moderna) vaccinations on 4/11/21, 5/9/21, 11/28/21, and 4/5/22.  A first Monkeypox vaccine dose was given today.  Influenza vaccine received 12/29/21.  Menactra vaccine given 11/18/15.  Tdap was boosted 1/21/15.  Prevnar 13 given 10/16/13; Pneumovax given 1/21/15.  HAV / HBV immunized / seroimmune.  HCV negative 6/1/09.  Negative rectal Pap done by Dr. Sanchez 6/28/10.  Antitreponemal antibody positive since 8/27/09 but repeat RPR was again negative on 6/12/20.  Quantiferon Gold was negative 11/12/15.  A screening hemoglobin A1C (because he has had several slightly elevated random glucoses over the past several years) was normal at 5.6% on 6/14/19.  Sees a dentist annually.  Sees a sleep apnea specialist for Cpap.  Saw an ophthalmologist in early 2012.  Had a  non-[facility] dermatologist appointment for a complete skin check in early 2019 and was told he had only chronic lentiga without any concerning lesions.  Underwent a routine screening colonoscopy at [facility] in [place], [place], in 1/19 which was normal (without polyps, per the patient) except for diverticulosis  -- encouraged to intake more fiber.          Again, thank you for allowing me to participate in the care of your patient.      Sincerely,    Rc Latham MD

## 2022-08-21 RX ORDER — ENALAPRIL MALEATE 20 MG/1
20 TABLET ORAL 2 TIMES DAILY
Qty: 180 TABLET | Refills: 3 | Status: SHIPPED | OUTPATIENT
Start: 2022-08-17 | End: 2023-01-12

## 2022-08-21 RX ORDER — AMLODIPINE BESYLATE 10 MG/1
10 TABLET ORAL DAILY
Qty: 90 TABLET | Refills: 3 | Status: SHIPPED | OUTPATIENT
Start: 2022-08-17 | End: 2023-01-12

## 2022-08-21 RX ORDER — BICTEGRAVIR SODIUM, EMTRICITABINE, AND TENOFOVIR ALAFENAMIDE FUMARATE 50; 200; 25 MG/1; MG/1; MG/1
1 TABLET ORAL DAILY
Qty: 90 TABLET | Refills: 3 | Status: SHIPPED | OUTPATIENT
Start: 2022-08-17 | End: 2022-10-11

## 2022-08-21 RX ORDER — FENOFIBRATE 54 MG/1
108 TABLET ORAL DAILY
Qty: 120 TABLET | Refills: 3 | Status: SHIPPED | OUTPATIENT
Start: 2022-08-17 | End: 2023-01-12

## 2022-08-21 NOTE — PROGRESS NOTES
Division of Infectious Diseases and International Medicine  Department of Medicine        [facility] OUTPATIENT VISIT NOTE Pray Mail Code 250  [address] [place] [address]  Office: [phone]  Fax:  [phone]     HISTORY OF PRESENT ILLNESS:    Gadiel Bee is a 56 year old gentleman with asymptomatic HIV infection who returns to [facility] today to follow-up his antiretroviral therapy with Biktarvy one tablet PO daily (switched to simplify dosing regimen in 8/20 from Combivir one tablet PO BID plus nevirapine 200 mg PO BID which were started in 5/03), along with management of other chronic health issues.  He rarely misses a Biktarvy dose and notices no drug side effect.  We reviewed his HIV, drug toxicity, and other monitoring lab tests drawn on 8/12/22.  He has continue to avoid Covid-19 infection and has received four Moderna Covid-19 vaccination doses on 4/11/21, 5/9/21, 11/28/21, and 4/5/22.  He decreased his fenofibrate dose from three to two 54 mg tablets each evening in early 2021 and now recently stopped the Tricor altogether about 1.5 months ago (as a trial to determine if he still needs it).  Unfortunately, the  8/12/22 triglycerides were up to 375.  His total cholesterol was also increased to 222 (although LDL was 99) on ongoing low-dose pravastatin 10 mg PO daily.  He is willing to try increasing the pravastatin dose since he has tolerated it without any myalgias or other side effects at the current low dose.  His TSH was low at 0.14 for the second year in a row on 8/12/22, so we will decrease his Synthroid dose from the present 200 mcg PO daily.  He continues to have difficulty sleeping due to sleep apnea and none of the several therapies for that have worked over past years (including Cpap), so he has located a physician in [place] who would be able to place an Inspire implant.  He went for a pre-operative examination in [place] and the provider doing  the pre-op evaluation flagged his low TSH as being a reason to delay the surgery.  He says that he wakes at night every two hours or less due to the TATY and feels tired in the daytimes a lot.  He believes the poor sleep likely is contributing to his persistent intractable depression which Dr. Johnston, his [place] psychiatrist, has been unable to fully control.  In the past year, his depression has been quite marked a substantial amount of time, such that a number of days he barely is motivated to get out of bed at all.  He has not been doing much aerobic exercise at all -- he finds it hard to get motivated to use his home exercise bike or to go out for a walk.  He has continued to mostly socially isolate.  He remains on amlodipine and enalapril for hypertension with well-controlled recent blood pressure (today 119/73).  He is now on tamsulosin 0.8 mg daily for BPH manifested by nocturia and some mild daytime urinary frequency.  He continues to use Axiron topically for hypotestosteronism.  He would like to start a Shingrix vaccine series today.  Beyond all these issues, his health seems stable and he lacks additional complaints today including no recent febrile or EENT symptoms, cough, dyspnea, chest pain, GI or  symptoms, or neurological symptoms.    PAST MEDICAL HISTORY:    1. HIV, diagnosed 7/93, previously treated with AZT, 3TC and Crixivan. He was then on AZT plus efavirenz, but in 5/03 began Combivir/Sustiva and has remained virologically suppressed on this regimen since.  2. Stable chronic stage 1 kidney disease:  Nephrology Clinic evaluation (including negative MRI scan) unremarkable in 2009.  3. Hyperlipidemia previously requiring multiple medications, more hypertriglyceridemia than hypercholesterolemia -- now on fenofibrate plus Pravachol which was added 4/18/18.   4. Hyperthyroidism on levothyroxine.   5. History of difficult-to-treat anxiety and depression -- followed for a number of years by psychiatrist  Dr. Abebe Johnston in [place].   6. Hypertension treated with Vasotec.   7. Acute appendicitis with rupture in the summer of 2007.  8. Cholecystectomy in the summer of 2007.   9. 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  Followed there annually.   10. 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  Previously received Sculptra.   11. History of a low testosterone level, most recently checked in 1/09 with a normal free testosterone of 14.0 and a total testosterone in the normal range of 482, managed somewhat unorthodoxly by a now-retired Urologist until 7/13.  Since then, has continued taking biweekly home exogenous testosterone IM injections and has tapered them only marginally from ~ weekly to ~ every other week (or a bit less frequent).  Total testosterone level on 5/14/14 was 2926.  12. Syphilis diagnosed 5/08, peak titer of 1:256, completed a treatment course of IM penicillin times 3, last dose in 6/08.  13. Prior rectal warts, treated with Aldara cream.  14. Infected lower left cracked mandibular molar tooth, repaired 6/10.  15. Unintentional overdose of alcohol, hydrocodone, and clonazepam for which hospitalized in [place] in 10/12.  16. Mild intermittent controlled extensor limb psoriasis.  17. Sleep apnea, diagnosed elsewhere.    Past Surgical History:   Procedure Laterality Date     APPENDECTOMY OPEN  Summer 2007.    For acute appendicitis with rupture.     CHOLECYSTECTOMY  2007.     ALLERGIES:  Penicillin and iodine.    CURRENT MEDICATIONS:  Current Outpatient Medications   Medication Sig Dispense Refill     amLODIPine (NORVASC) 10 MG tablet TAKE ONE TABLET BY MOUTH ONCE DAILY 90 tablet 2     BIKTARVY -25 MG per tablet TAKE ONE TABLET BY MOUTH ONCE DAILY 90 tablet 2     buPROPion (WELLBUTRIN XL) 150 MG 24 hr tablet 150 mg  daily Patient takes 3 tabs daily for total 450mg       enalapril (VASOTEC) 10 MG tablet Take 10 mg by mouth       enalapril (VASOTEC) 20 MG tablet TAKE ONE TABLET BY MOUTH TWICE A  tablet 2     fenofibrate (LOFIBRA) 54 MG tablet TAKE TWO TABLETS BY MOUTH ONCE DAILY 180 tablet 2     hydrOXYzine (ATARAX) 25 MG tablet        lamoTRIgine (LAMICTAL) 200 MG tablet Take 200 mg by mouth       levothyroxine (SYNTHROID/LEVOTHROID) 100 MCG tablet Take 1 tablet (100 mcg) by mouth daily 30 tablet 11     levothyroxine (SYNTHROID/LEVOTHROID) 100 MCG tablet TAKE TWO TABLETS BY MOUTH ONCE DAILY 180 tablet 2     multivitamin w/minerals (THERA-VIT-M) tablet Take 1 tablet by mouth daily       pravastatin (PRAVACHOL) 20 MG tablet Take 1 tablet (20 mg) by mouth daily 90 tablet 3     tadalafil (CIALIS) 20 MG tablet Take 1 tablet (20 mg) by mouth daily as needed (take 2 hours before intercourse) 30 tablet 11     tamsulosin (FLOMAX) 0.4 MG capsule Take 2 capsules (0.8 mg) by mouth every evening 180 capsule 1     testosterone (AXIRON) 30 MG/ACT topical solution Place 1 pump (30 mg) onto the skin daily 270 mL 5     traZODone (DESYREL) 50 MG tablet        FAMILY HISTORY:  Family History   Problem Relation Age of Onset     Depression Father      Hyperlipidemia Father      Depression Other         Multiple family members.     Heart Disease Maternal Grandmother      Myocardial Infarction Maternal Grandfather         Sudden MI / death at age 51.     Hyperlipidemia Maternal Grandfather      Obesity Paternal Grandmother      Hyperlipidemia Other         Multiple family members (Parents, brother, sister, PGM)     SOCIAL HISTORY:  Lives alone in basement apartment in [place] abut spends much of his time at his parents home in [facility] [place] in the town he grew up in and where all his siblings still live.  Unemployed, on disability based on his depression diagnosis.  Sexually active, no single steady partner, mostly with other HIV  "seropositive men, but always discloses his status when with HIV seronegative partners and uses protection.  In the past, has visited friends in [place] for an extended stay.  Tobacco:  Long-standing, up to 2.5 ppd in the past.  Presently 1 ppd (but smokes only half of each cigarette).  Has quit for up to three weeks several times in the past; not interested in further attempts at reduction at present.  Has a treadmill in the basement of his parents home that he can use for indoor exercise during cold weather, although he does so infrequently.  His father was diagnosed with cancer in winter 2019-20.    REVIEW OF SYSTEMS:  As per HPI.    PHYSICAL EXAMINATION:  General:  A pleasant, conversant, WDWN 56-year-old man in no discomfort.  Vital Signs:  /73   Pulse 96   Temp 98.6  F (37  C) (Oral)   Ht 1.753 m (5' 9\")   Wt 74.8 kg (164 lb 14.4 oz)   SpO2 98%   BMI 24.35 kg/m   (weight increased about five pounds since 1/2022).  Skin:  No acute rash or lesion.  Scattered chronic keratoses.  Head/Neck:  NCAT.  Old stable bilateral mild buccal facial lipoatrophy.  External auditory canals are patent bilaterally without discharge.  PERRL. EOMI.  Anicteric xclerae.  Conjunctivae are pink and without injection.  Oropharynx contains no erythema, exudate or lesion.  Dentition appears normal.  Neck is supple without lymphadenopathy or thyromegaly.  Lungs:  Bilaterally clear to auscultation.  CV:  RRR, normal S1, S2 without gallop, murmur or rub.  Abdomen:  Bowel sounds normal, soft, nontender, no hepatosplenomegaly, no mass, old surgical scar unchanged.  Back:  No lumbar or CVA tenderness.  Extremities:  Distally warm, no edema.  Neurological:  Alert, oriented, memory / cognition / affect intact.  Gait is normal.    LABORATORY STUDIES (Reviewed with the patient during his appointment today):      HIV-1 RNA copies/mL 08/12/2022 Not Detected  Not Detected Copies/mL Final     CD3% Total T Cells 08/12/2022 90 (A) 49 - 84 % " Final     Absolute CD3, Total T Cells 08/12/2022 2,512  603 - 2,990 cells/uL Final     CD4% [place] T Cells 08/12/2022 37  28 - 63 % Final     Absolute CD4, [place] T Cells 08/12/2022 1,034  441 - 2,156 cells/uL Final     CD8% Suppressor T Cells 08/12/2022 57 (A) 10 - 40 % Final     Absolute CD8, Suppressor T Cells 08/12/2022 1,597 (A) 125 - 1,312 cells/uL Final     CD4:CD8 Ratio 08/12/2022 0.65 (A) 1.40 - 2.60 Final     Sodium 08/12/2022 132 (A) 133 - 144 mmol/L Final     Potassium 08/12/2022 4.2  3.4 - 5.3 mmol/L Final     Chloride 08/12/2022 105  94 - 109 mmol/L Final     Carbon Dioxide (CO2) 08/12/2022 24  20 - 32 mmol/L Final     Anion Gap 08/12/2022 3  3 - 14 mmol/L Final     Urea Nitrogen 08/12/2022 18  7 - 30 mg/dL Final     Creatinine 08/12/2022 1.03  0.66 - 1.25 mg/dL Final     Calcium 08/12/2022 9.7  8.5 - 10.1 mg/dL Final     Glucose 08/12/2022 117 (A) 70 - 99 mg/dL Final     Alkaline Phosphatase 08/12/2022 117  40 - 150 U/L Final     AST 08/12/2022 16  0 - 45 U/L Final     ALT 08/12/2022 39  0 - 70 U/L Final     Protein Total 08/12/2022 7.9  6.8 - 8.8 g/dL Final     Albumin 08/12/2022 4.1  3.4 - 5.0 g/dL Final     Bilirubin Total 08/12/2022 0.6  0.2 - 1.3 mg/dL Final     GFR Estimate 08/12/2022 86  >60 mL/min/1.73m2 Final    Effective December 21, 2021 eGFRcr in adults is calculated using the 2021 CKD-EPI creatinine equation which includes age and gender (Michel brush al., NEJM, DOI: 10.1056/JPMBga4763602)     Lipase 08/12/2022 147  73 - 393 U/L Final     Cholesterol 08/12/2022 222 (A) <200 mg/dL Final     Triglycerides 08/12/2022 375 (A) <150 mg/dL Final     Direct Measure HDL 08/12/2022 48  >=40 mg/dL Final     LDL Cholesterol Calculated 08/12/2022 99  <=100 mg/dL Final     Non HDL Cholesterol 08/12/2022 174 (A) <130 mg/dL Final     Patient Fasting > 8hrs? 08/12/2022 Yes   Final     TSH 08/12/2022 0.14 (A) 0.40 - 4.00 mU/L Final     Testosterone Total 08/12/2022 452  240 - 950 ng/dL Final     WBC Count  08/12/2022 10.1  4.0 - 11.0 10e3/uL Final     RBC Count 08/12/2022 4.76  4.40 - 5.90 10e6/uL Final     Hemoglobin 08/12/2022 14.6  13.3 - 17.7 g/dL Final     Hematocrit 08/12/2022 42.4  40.0 - 53.0 % Final     MCV 08/12/2022 89  78 - 100 fL Final     MCH 08/12/2022 30.7  26.5 - 33.0 pg Final     MCHC 08/12/2022 34.4  31.5 - 36.5 g/dL Final     RDW 08/12/2022 12.9  10.0 - 15.0 % Final     Platelet Count 08/12/2022 328  150 - 450 10e3/uL Final     % Neutrophils 08/12/2022 60  % Final     % Lymphocytes 08/12/2022 30  % Final     % Monocytes 08/12/2022 6  % Final     % Eosinophils 08/12/2022 3  % Final     % Basophils 08/12/2022 1  % Final     % Immature Granulocytes 08/12/2022 0  % Final     NRBCs per 100 WBC 08/12/2022 0  <1 /100 Final     Absolute Neutrophils 08/12/2022 6.1  1.6 - 8.3 10e3/uL Final     Absolute Lymphocytes 08/12/2022 3.0  0.8 - 5.3 10e3/uL Final     Absolute Monocytes 08/12/2022 0.6  0.0 - 1.3 10e3/uL Final     Absolute Eosinophils 08/12/2022 0.3  0.0 - 0.7 10e3/uL Final     Absolute Basophils 08/12/2022 0.1  0.0 - 0.2 10e3/uL Final     Absolute Immature Granulocytes 08/12/2022 0.0  <=0.4 10e3/uL Final     Absolute NRBCs 08/12/2022 0.0  10e3/uL Final     Hemoglobin A1C 08/12/2022 5.7 (A) 0.0 - 5.6 % Final    Normal <5.7%   Prediabetes 5.7-6.4%    Diabetes 6.5% or higher     Note: Adopted from ADA consensus guidelines.     ASSESSMENT/PLAN:    1. Well-controlled, asymptomatic HIV infection:  He continues to take and tolerate Biktarvy (switched in 6/20 from long-standing Combivir / nevirapine to Biktarvy) with a stably normal absolute CD4+ cell count and an undetectable HIV plasma viral load.  He will remain on Biktarvy and return to [facility] for follow-up in six months (with pre-labs before that visit, ordered today), or as needed before then.  2. Increased mixed hyperlipidemia:  Off fenofibrate for the past 1.5 months or so (as a trial to determine if he really needed it) his 8/12/22  triglycerides level has jumped up to 375 and his total cholesterol is also a bit higher.  He will resume taking Tricor 108 mg (two 54 mg tablets) PO daily and we will also increase his low pravastatin dose from 10 mg to 20 mg daily, since, so far, he has tolerated the Pravachol very well without any of the side effect he previously experienced with a different statin long ago.  He will phone if he notices new side effects, and we will recheck the lipid profile again in six months.  3. Hypothyroidism on levothyroxine:  For the past two annual checks, his TSH level has been below the lower limit of assay normal on long-standing 200 mcg daily.  We discussed that having a low TSH should not present any clinical problem or barrier to undergoing a surgery or procedure (such as his planned implantation of the Inspire device), but that level does suggest he is on more levothyroxine than he needs.  So, we will now decrease his dose to 100 mcg (one tablet) per day and recheck a TSH in six months.  He plans to discuss the specifics of why a low TSH might be some sort of contraindication to a surgical procedure with his [place] providers -- I told him he can ask them to contact me if they have questions or continue to believe this is a concern.  4. Obstructive sleep apnea:  As above, he is hoping to have an Inspire device implanted by a physician in [place], because CPap and other therapies to reduce his sleep apnea have been unsuccessful.  I told him I am not aware of any contraindication to undergoing that surgery.  5. Nocturia / presumed benign prostatism / history of Peyronie's disease:  He has had  symptoms consistent with BPH and had palpable prostatic enlargement on 10/18/17 digital rectal exam with a normal PSA screen at that time, so has now been on Flomax 0.8 mg PO q evening (since 6/22 through Urology Clinic) with, apparently some improvement, since the nocturia and daytime frequency were not complained of today.  He  was seen by Dr. Irwin in Urology clinic on 6/3/22 and underwent cystoscopy.  6. Well-controlled essential hypertension:  On ongoing amlodipine 10 mg PO daily (increased 5/17/17, started 5/20/15) and enalapril 20 mg PO BID (started 11/29/11 and dose increased 1/27/15) his clinic check-in blood pressure today (and at Urology Clinic in 6/22) is good.  Will continue the present therapy.  As usual, I again encouraged frequent aerobic exercise.  7. Testosterone replacement therapy / previous erectile dysfunction:  His most recent total testosterone level on 8/12/22 remains normal at 452 on ongoing Axiron transcutaneous testosterone topical replacement therapy, initially at two (30 mg) pumps (= 60 mg) daily (since late 1/15), but now at one pump daily since mid-2019.  Will continue the present dose.  8. 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  This was another reason he switched off zidovudine in summer 2020, although there has not been any evident progression of his lipodystrophy for a number of years.  9. Chronic, intractable depression / anxiety / insomnia / agoraphobia:  Managed by Dr. Johnston, his psychiatrist in the [facility] in [place], who he sees often.  10. History of prior, mild, mid-winter vitamin D deficiency:  He self-takes OTC vitamin D supplements during some white but not yet this year, although whether he even needs that is questionable, because his 10/12/18 vitamin D level was adequate at 20.  11. Mild psoriasis, presently in remission:  Well-controlled with topical Clobex prn.  Not discussed today.  12. Health Care Maintenance:  Has received four Covid-19 (Moderna) vaccinations on 4/11/21, 5/9/21, 11/28/21, and 4/5/22.  A first Monkeypox vaccine dose was given today.  Influenza vaccine received 12/29/21.  Menactra vaccine given 11/18/15.  Tdap was boosted 1/21/15.  Prevnar  13 given 10/16/13; Pneumovax given 1/21/15.  A first Shingrix vaccination was given today.  HAV / HBV immunized / seroimmune.  HCV negative 6/1/09.  Negative rectal Pap done by Dr. Sanchez 6/28/10.  Antitreponemal antibody positive since 8/27/09 but repeat RPR was again negative on 6/12/20.  Quantiferon Gold was negative 11/12/15.  A screening hemoglobin A1C (because he has had several slightly elevated random glucoses over the past several years) was normal at 5.6% on 6/14/19.  Sees a dentist annually.  Sees a sleep apnea specialist for Cpap.  Saw an ophthalmologist in early 2012.  Had a  non-[facility] dermatologist appointment for a complete skin check in early 2019 and was told he had only chronic lentiga without any concerning lesions.  Underwent a routine screening colonoscopy at [facility] in [place], [place], in 1/19 which was normal (without polyps, per the patient) except for diverticulosis  -- encouraged to intake more fiber.

## 2022-09-21 ENCOUNTER — ALLIED HEALTH/NURSE VISIT (OUTPATIENT)
Dept: INFECTIOUS DISEASES | Facility: CLINIC | Age: 57
End: 2022-09-21
Attending: INTERNAL MEDICINE
Payer: COMMERCIAL

## 2022-09-21 DIAGNOSIS — Z23 NEED FOR VACCINATION: Primary | ICD-10-CM

## 2022-09-21 PROCEDURE — 90611 SMALLPOX&MONKEYPOX VAC 0.5ML: CPT | Performed by: INTERNAL MEDICINE

## 2022-09-21 PROCEDURE — 250N000011 HC RX IP 250 OP 636: Performed by: INTERNAL MEDICINE

## 2022-09-21 PROCEDURE — 90471 IMMUNIZATION ADMIN: CPT | Performed by: INTERNAL MEDICINE

## 2022-09-21 RX ADMIN — RABIES VACCINE 0.1 ML: KIT at 13:41

## 2022-09-21 NOTE — PROGRESS NOTES
Monkeypox/Jynneos Vaccine    Criteria for Jynneos Vaccination (9/6/22:    No life-threatening allergies to gentamicin, ciprofloxacin or chicken/egg protein (these are components of the vaccine).     No history of keloid scars.     AND VACCINE Eligibility criteria  based on one of the following:     Person identifies as khan, bisexual, or other man who has sex with men (MSM) and has had >1 partner in the past 90 days.    Other person deemed at high risk per clinical judgement or public health recommendation (i.e. treated for a sexually transmitted diseases in the past 6 months, people experiencing homelessness, incarceration, or travel to an area with monkeypox cases)    Person engages in sex work, or exchange sex for food, money, substances, shelter, etc. (not limited to MSM).    Patient is eligible for the Monkeypox vaccine. Yes      Chiki Cai RN  Infectious Disease 1:31 PM 09/21/22

## 2022-10-11 DIAGNOSIS — Z21 HUMAN IMMUNODEFICIENCY VIRUS I INFECTION (H): ICD-10-CM

## 2022-10-11 RX ORDER — BICTEGRAVIR SODIUM, EMTRICITABINE, AND TENOFOVIR ALAFENAMIDE FUMARATE 50; 200; 25 MG/1; MG/1; MG/1
TABLET ORAL
Qty: 90 TABLET | Refills: 2 | Status: SHIPPED | OUTPATIENT
Start: 2022-10-11 | End: 2023-02-15

## 2022-10-12 ENCOUNTER — ALLIED HEALTH/NURSE VISIT (OUTPATIENT)
Dept: INFECTIOUS DISEASES | Facility: CLINIC | Age: 57
End: 2022-10-12
Attending: INTERNAL MEDICINE
Payer: COMMERCIAL

## 2022-10-12 DIAGNOSIS — Z23 NEED FOR VACCINATION: Primary | ICD-10-CM

## 2022-10-12 PROCEDURE — 250N000021 HC RX MED A9270 GY (STAT IND- M) 250: Performed by: INTERNAL MEDICINE

## 2022-10-12 PROCEDURE — 90471 IMMUNIZATION ADMIN: CPT | Performed by: INTERNAL MEDICINE

## 2022-10-12 PROCEDURE — 90750 HZV VACC RECOMBINANT IM: CPT | Performed by: INTERNAL MEDICINE

## 2022-10-12 RX ADMIN — ZOSTER VACCINE RECOMBINANT, ADJUVANTED 0.5 ML: KIT at 15:21

## 2022-10-12 NOTE — NURSING NOTE
Second dose of Shingrix given per Dr. Latham orders. First name, last name and  verified prior to administration. Injection given without complications or questions, see MAR for details.    Nery Melvin, SALLY  10/12/2022 3:23 PM

## 2022-10-22 ENCOUNTER — HEALTH MAINTENANCE LETTER (OUTPATIENT)
Age: 57
End: 2022-10-22

## 2023-01-12 DIAGNOSIS — E78.2 MIXED HYPERLIPIDEMIA: ICD-10-CM

## 2023-01-12 DIAGNOSIS — I10 ESSENTIAL HYPERTENSION: ICD-10-CM

## 2023-01-12 RX ORDER — PRAVASTATIN SODIUM 10 MG
TABLET ORAL
Qty: 90 TABLET | Refills: 1 | Status: SHIPPED | OUTPATIENT
Start: 2023-01-12 | End: 2023-02-15 | Stop reason: DRUGHIGH

## 2023-01-12 RX ORDER — ENALAPRIL MALEATE 20 MG/1
TABLET ORAL
Qty: 180 TABLET | Refills: 1 | Status: SHIPPED | OUTPATIENT
Start: 2023-01-12 | End: 2023-02-18

## 2023-01-12 RX ORDER — FENOFIBRATE 54 MG/1
TABLET ORAL
Qty: 180 TABLET | Refills: 1 | Status: SHIPPED | OUTPATIENT
Start: 2023-01-12 | End: 2022-12-01

## 2023-01-12 RX ORDER — AMLODIPINE BESYLATE 10 MG/1
TABLET ORAL
Qty: 90 TABLET | Refills: 1 | Status: SHIPPED | OUTPATIENT
Start: 2023-01-12 | End: 2023-02-18

## 2023-01-14 DIAGNOSIS — R35.1 BENIGN PROSTATIC HYPERPLASIA WITH NOCTURIA: ICD-10-CM

## 2023-01-14 DIAGNOSIS — N40.1 BENIGN PROSTATIC HYPERPLASIA WITH NOCTURIA: ICD-10-CM

## 2023-01-17 RX ORDER — TAMSULOSIN HYDROCHLORIDE 0.4 MG/1
0.8 CAPSULE ORAL EVERY EVENING
Qty: 180 CAPSULE | Refills: 1 | Status: SHIPPED | OUTPATIENT
Start: 2023-01-17 | End: 2023-02-18

## 2023-01-17 NOTE — TELEPHONE ENCOUNTER
Last Office Visit : 6/3/22 Dr Irwin  Future Office visit: Return to clinic 1 year with psa, sooner if needed         Overridden by Edwar Irwin MD on Abdirashid 3, 2022 11:49 AM   Drug-Drug   1. PHOSPHODIESTERASE TYPE 5 INHIBITORS / ALPHA BLOCKERS [Level: Moderate] [Reason: Will monitor drug levels/drug effects ]   Other Orders: tadalafil (CIALIS) 20 MG tablet

## 2023-02-06 ENCOUNTER — DOCUMENTATION ONLY (OUTPATIENT)
Dept: LAB | Facility: CLINIC | Age: 58
End: 2023-02-06
Payer: COMMERCIAL

## 2023-02-06 DIAGNOSIS — Z21 HUMAN IMMUNODEFICIENCY VIRUS I INFECTION (H): Primary | ICD-10-CM

## 2023-02-10 ENCOUNTER — LAB (OUTPATIENT)
Dept: LAB | Facility: CLINIC | Age: 58
End: 2023-02-10
Payer: COMMERCIAL

## 2023-02-10 DIAGNOSIS — Z21 HUMAN IMMUNODEFICIENCY VIRUS I INFECTION (H): ICD-10-CM

## 2023-02-10 DIAGNOSIS — E78.2 MIXED HYPERLIPIDEMIA: ICD-10-CM

## 2023-02-10 DIAGNOSIS — E03.9 HYPOTHYROIDISM, UNSPECIFIED TYPE: ICD-10-CM

## 2023-02-10 LAB
ALBUMIN SERPL BCG-MCNC: 4.6 G/DL (ref 3.5–5.2)
ALP SERPL-CCNC: 86 U/L (ref 40–129)
ALT SERPL W P-5'-P-CCNC: 28 U/L (ref 10–50)
ANION GAP SERPL CALCULATED.3IONS-SCNC: 12 MMOL/L (ref 7–15)
AST SERPL W P-5'-P-CCNC: 18 U/L (ref 10–50)
BASOPHILS # BLD AUTO: 0.1 10E3/UL (ref 0–0.2)
BASOPHILS NFR BLD AUTO: 1 %
BILIRUB SERPL-MCNC: 0.3 MG/DL
BUN SERPL-MCNC: 12.1 MG/DL (ref 6–20)
CALCIUM SERPL-MCNC: 10.1 MG/DL (ref 8.6–10)
CD3 CELLS # BLD: 3101 CELLS/UL (ref 603–2990)
CD3 CELLS NFR BLD: 89 % (ref 49–84)
CD3+CD4+ CELLS # BLD: 1228 CELLS/UL (ref 441–2156)
CD3+CD4+ CELLS NFR BLD: 35 % (ref 28–63)
CD3+CD4+ CELLS/CD3+CD8+ CLL BLD: 0.6 % (ref 1.4–2.6)
CD3+CD8+ CELLS # BLD: 2031 CELLS/UL (ref 125–1312)
CD3+CD8+ CELLS NFR BLD: 58 % (ref 10–40)
CHLORIDE SERPL-SCNC: 102 MMOL/L (ref 98–107)
CREAT SERPL-MCNC: 1.2 MG/DL (ref 0.67–1.17)
DEPRECATED HCO3 PLAS-SCNC: 24 MMOL/L (ref 22–29)
EOSINOPHIL # BLD AUTO: 0.3 10E3/UL (ref 0–0.7)
EOSINOPHIL NFR BLD AUTO: 3 %
ERYTHROCYTE [DISTWIDTH] IN BLOOD BY AUTOMATED COUNT: 13.1 % (ref 10–15)
GFR SERPL CREATININE-BSD FRML MDRD: 71 ML/MIN/1.73M2
GLUCOSE SERPL-MCNC: 113 MG/DL (ref 70–99)
HCT VFR BLD AUTO: 42.5 % (ref 40–53)
HGB BLD-MCNC: 14.6 G/DL (ref 13.3–17.7)
IMM GRANULOCYTES # BLD: 0.1 10E3/UL
IMM GRANULOCYTES NFR BLD: 1 %
LYMPHOCYTES # BLD AUTO: 3.5 10E3/UL (ref 0.8–5.3)
LYMPHOCYTES NFR BLD AUTO: 34 %
MCH RBC QN AUTO: 30.5 PG (ref 26.5–33)
MCHC RBC AUTO-ENTMCNC: 34.4 G/DL (ref 31.5–36.5)
MCV RBC AUTO: 89 FL (ref 78–100)
MONOCYTES # BLD AUTO: 0.6 10E3/UL (ref 0–1.3)
MONOCYTES NFR BLD AUTO: 6 %
NEUTROPHILS # BLD AUTO: 5.5 10E3/UL (ref 1.6–8.3)
NEUTROPHILS NFR BLD AUTO: 55 %
NRBC # BLD AUTO: 0 10E3/UL
NRBC BLD AUTO-RTO: 0 /100
PLATELET # BLD AUTO: 421 10E3/UL (ref 150–450)
POTASSIUM SERPL-SCNC: 4.6 MMOL/L (ref 3.4–5.3)
PROT SERPL-MCNC: 7.7 G/DL (ref 6.4–8.3)
RBC # BLD AUTO: 4.78 10E6/UL (ref 4.4–5.9)
SODIUM SERPL-SCNC: 138 MMOL/L (ref 136–145)
T CELL COMMENT: ABNORMAL
WBC # BLD AUTO: 10.1 10E3/UL (ref 4–11)

## 2023-02-10 PROCEDURE — 87536 HIV-1 QUANT&REVRSE TRNSCRPJ: CPT | Performed by: INTERNAL MEDICINE

## 2023-02-10 PROCEDURE — 80061 LIPID PANEL: CPT | Performed by: PATHOLOGY

## 2023-02-10 PROCEDURE — 85027 COMPLETE CBC AUTOMATED: CPT | Performed by: PATHOLOGY

## 2023-02-10 PROCEDURE — 36415 COLL VENOUS BLD VENIPUNCTURE: CPT | Performed by: PATHOLOGY

## 2023-02-10 PROCEDURE — 86360 T CELL ABSOLUTE COUNT/RATIO: CPT | Performed by: INTERNAL MEDICINE

## 2023-02-10 PROCEDURE — 84439 ASSAY OF FREE THYROXINE: CPT | Performed by: PATHOLOGY

## 2023-02-10 PROCEDURE — 84443 ASSAY THYROID STIM HORMONE: CPT | Performed by: PATHOLOGY

## 2023-02-10 PROCEDURE — 80053 COMPREHEN METABOLIC PANEL: CPT | Performed by: PATHOLOGY

## 2023-02-14 LAB — HIV1 RNA # PLAS NAA DL=20: NOT DETECTED COPIES/ML

## 2023-02-15 ENCOUNTER — OFFICE VISIT (OUTPATIENT)
Dept: INFECTIOUS DISEASES | Facility: CLINIC | Age: 58
End: 2023-02-15
Attending: INTERNAL MEDICINE
Payer: COMMERCIAL

## 2023-02-15 VITALS
BODY MASS INDEX: 24.71 KG/M2 | SYSTOLIC BLOOD PRESSURE: 121 MMHG | HEIGHT: 69 IN | TEMPERATURE: 98 F | OXYGEN SATURATION: 96 % | WEIGHT: 166.8 LBS | DIASTOLIC BLOOD PRESSURE: 81 MMHG | HEART RATE: 124 BPM

## 2023-02-15 DIAGNOSIS — N40.1 BENIGN PROSTATIC HYPERPLASIA WITH NOCTURIA: ICD-10-CM

## 2023-02-15 DIAGNOSIS — E03.9 HYPOTHYROIDISM, UNSPECIFIED TYPE: ICD-10-CM

## 2023-02-15 DIAGNOSIS — R35.1 BENIGN PROSTATIC HYPERPLASIA WITH NOCTURIA: ICD-10-CM

## 2023-02-15 DIAGNOSIS — G47.30 SLEEP APNEA, UNSPECIFIED TYPE: ICD-10-CM

## 2023-02-15 DIAGNOSIS — E78.2 MIXED HYPERLIPIDEMIA: ICD-10-CM

## 2023-02-15 DIAGNOSIS — F33.1 MODERATE EPISODE OF RECURRENT MAJOR DEPRESSIVE DISORDER (H): ICD-10-CM

## 2023-02-15 DIAGNOSIS — E34.9 HYPOTESTOSTERONISM: ICD-10-CM

## 2023-02-15 DIAGNOSIS — I10 ESSENTIAL HYPERTENSION: ICD-10-CM

## 2023-02-15 DIAGNOSIS — E03.9 ACQUIRED HYPOTHYROIDISM: ICD-10-CM

## 2023-02-15 DIAGNOSIS — Z21 HUMAN IMMUNODEFICIENCY VIRUS I INFECTION (H): Primary | ICD-10-CM

## 2023-02-15 LAB
CHOLEST SERPL-MCNC: 266 MG/DL
HDLC SERPL-MCNC: 38 MG/DL
LDLC SERPL CALC-MCNC: ABNORMAL MG/DL
NONHDLC SERPL-MCNC: 228 MG/DL
T4 FREE SERPL-MCNC: 1.48 NG/DL (ref 0.9–1.7)
TRIGL SERPL-MCNC: 427 MG/DL
TSH SERPL DL<=0.005 MIU/L-ACNC: 3.3 UIU/ML (ref 0.3–4.2)

## 2023-02-15 PROCEDURE — G0463 HOSPITAL OUTPT CLINIC VISIT: HCPCS | Performed by: INTERNAL MEDICINE

## 2023-02-15 PROCEDURE — 99215 OFFICE O/P EST HI 40 MIN: CPT | Performed by: INTERNAL MEDICINE

## 2023-02-15 RX ORDER — BICTEGRAVIR SODIUM, EMTRICITABINE, AND TENOFOVIR ALAFENAMIDE FUMARATE 50; 200; 25 MG/1; MG/1; MG/1
1 TABLET ORAL DAILY
Qty: 90 TABLET | Refills: 3 | Status: SHIPPED | OUTPATIENT
Start: 2023-02-15 | End: 2023-07-17

## 2023-02-15 ASSESSMENT — PAIN SCALES - GENERAL: PAINLEVEL: NO PAIN (0)

## 2023-02-15 NOTE — NURSING NOTE
"Chief Complaint   Patient presents with     RECHECK     b20     /81   Pulse (!) 124   Temp 98  F (36.7  C) (Oral)   Ht 1.753 m (5' 9\")   Wt 75.7 kg (166 lb 12.8 oz)   SpO2 96%   BMI 24.63 kg/m      Octavia Jain MA    "

## 2023-02-16 ENCOUNTER — TELEPHONE (OUTPATIENT)
Dept: INFECTIOUS DISEASES | Facility: CLINIC | Age: 58
End: 2023-02-16
Payer: COMMERCIAL

## 2023-02-16 NOTE — TELEPHONE ENCOUNTER
EP LVM 2/16 to sched 6 month (around 8/15) follow up with Dr. Latham per checkout notes from 2/15.

## 2023-02-18 RX ORDER — ENALAPRIL MALEATE 20 MG/1
20 TABLET ORAL 2 TIMES DAILY
Qty: 180 TABLET | Refills: 3 | Status: SHIPPED | OUTPATIENT
Start: 2023-02-15 | End: 2023-08-09

## 2023-02-18 RX ORDER — LEVOTHYROXINE SODIUM 100 UG/1
100 TABLET ORAL DAILY
Qty: 90 TABLET | Refills: 3 | Status: SHIPPED | OUTPATIENT
Start: 2023-02-15 | End: 2023-08-16

## 2023-02-18 RX ORDER — AMLODIPINE BESYLATE 10 MG/1
10 TABLET ORAL DAILY
Qty: 90 TABLET | Refills: 3 | Status: SHIPPED | OUTPATIENT
Start: 2023-02-15 | End: 2023-08-16

## 2023-02-18 RX ORDER — PRAVASTATIN SODIUM 20 MG
20 TABLET ORAL DAILY
Qty: 90 TABLET | Refills: 3 | Status: SHIPPED | OUTPATIENT
Start: 2023-02-15 | End: 2023-08-16

## 2023-02-18 RX ORDER — TAMSULOSIN HYDROCHLORIDE 0.4 MG/1
0.8 CAPSULE ORAL EVERY EVENING
Qty: 180 CAPSULE | Refills: 3 | Status: SHIPPED | OUTPATIENT
Start: 2023-02-15 | End: 2023-08-16

## 2023-02-18 NOTE — PROGRESS NOTES
Division of Infectious Diseases and International Medicine  Department of Medicine        [facility] OUTPATIENT VISIT NOTE [place] Mail Code 034  [address] [place] [address]  Office: [phone]  Fax:  [phone]     HISTORY OF PRESENT ILLNESS:  Mr. Bee is a 57 year old gentleman with asymptomatic HIV infection.  He returns today to clinic today for follow-up of multiple ongoing health issues including his continued antiretroviral therapy comprised of Biktarvy one tablet PO daily (switched to simplify his dosing regimen in 8/20 from Combivir one tablet PO BID plus nevirapine 200 mg PO BID which were started in 5/03).  He has not missed a Biktarvy dose in a long while and describes no drug side effects.  He had recent  HIV, drug toxicity, and other monitoring lab tests drawn on 2/10/23 and his HIV plasma viral load remains undetectable.  He has continue to avoid Covid-19 infection and has received five Moderna Covid-19 vaccination doses on 4/11/21, 5/9/21, 11/28/21, 4/5/22, and (bivalent) 10/22/22.  He discontinued his fenofibrate medication altogether in 6/2022 and the plan was to recheck his lipid panel off that now, but mistakenly a lipid panel did not get drawn on 2/10/23, but we will add that on to those labs today, along with repeat thyroid studies, since his levothyroxine dose was decreased to 100 mcg from 200 mcg in summer 2022 mid-last year.  He has added about six pounds of weight since early 2022.  His primary health issue in recent months is worse depression manifested by decreased general energy, a propensity to sleep excessive hours with difficulty motivating himself to get out of bed, and poor motivation to do things he wants or needs to accomplish.  He continues to sleep poorly which he believes may be due to his uncontrolled sleep apnea, and believes that may be contributing to the marked fatigue and depression.  He continues to be followed by his psychiatrist,  Dr. Johnston in [place], who recently removed his lamotrigine prescription for the possibility it might be contributing to his somnolence, but that change does not seem to have had any positive effect (and whether it made things worse is difficult to tell).  After a considerable delay of two thirds of a year, he is now finally re-scheduled to have the Inspire implant placed for his sleep apnea diagnosis next week on 2/23/23 (since, in the past, he has proven unable to tolerate Cpap and other sleep apnea therapies).  He is hoping that will improve his sleep apnea and the fatigue and depression.  After healing from that procedure, Dr. Johnston may try adding in an additional new anti-depression medicine.  He remains on amlodipine and enalapril for hypertension with ongoing good blood pressure control (including a clinic check-in blood pressure today of 121/81).  He continues to take tamsulosin 0.8 mg daily for BPH (manifested by nocturia and some mild daytime urinary frequency) with some benefit.  He continues to use Axiron topically for hypotestosteronism. Beyond all these concerns, he feels otherwise stable of late and does not report additional complaints today, including no recent febrile or EENT symptoms, cough, dyspnea, chest pain, GI or  symptoms, rash, or neurological symptoms.    PAST MEDICAL HISTORY:    1. HIV, diagnosed 7/93, previously treated with AZT, 3TC and Crixivan. He was then on AZT plus efavirenz, but in 5/03 began Combivir/Sustiva and has remained virologically suppressed on this regimen since.  2. Stable chronic stage 1 kidney disease:  Nephrology Clinic evaluation (including negative MRI scan) unremarkable in 2009.  3. Hyperlipidemia previously requiring multiple medications, more hypertriglyceridemia than hypercholesterolemia -- now on fenofibrate plus Pravachol which was added 4/18/18.   4. Hyperthyroidism on levothyroxine.   5. History of difficult-to-treat anxiety and depression -- followed for a  number of years by psychiatrist Dr. Abebe Johnston in [place].   6. Hypertension treated with Vasotec.   7. Acute appendicitis with rupture in the summer of 2007.  8. Cholecystectomy in the summer of 2007.   9. 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  Followed there annually.   10. 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  Previously received Sculptra.   11. History of a low testosterone level, most recently checked in 1/09 with a normal free testosterone of 14.0 and a total testosterone in the normal range of 482, managed somewhat unorthodoxly by a now-retired Urologist until 7/13.  Since then, has continued taking biweekly home exogenous testosterone IM injections and has tapered them only marginally from ~ weekly to ~ every other week (or a bit less frequent).  Total testosterone level on 5/14/14 was 2926.  12. Syphilis diagnosed 5/08, peak titer of 1:256, completed a treatment course of IM penicillin times 3, last dose in 6/08.  13. Prior rectal warts, treated with Aldara cream.  14. Infected lower left cracked mandibular molar tooth, repaired 6/10.  15. Unintentional overdose of alcohol, hydrocodone, and clonazepam for which hospitalized in [place] in 10/12.  16. Mild intermittent controlled extensor limb psoriasis.  17. Sleep apnea, diagnosed elsewhere.  Inspire implant to be placed on 2/23/23.    Past Surgical History:   Procedure Laterality Date     APPENDECTOMY OPEN  Summer 2007.    For acute appendicitis with rupture.     CHOLECYSTECTOMY  2007.     ALLERGIES:  Penicillin and iodine.    CURRENT MEDICATIONS:  Current Outpatient Medications   Medication Sig Dispense Refill     amLODIPine (NORVASC) 10 MG tablet TAKE ONE TABLET BY MOUTH ONCE DAILY. 90 tablet 1     bictegravir-emtricitabine-tenofovir (BIKTARVY) -25 MG per  tablet Take 1 tablet by mouth daily 90 tablet 3     buPROPion (WELLBUTRIN XL) 150 MG 24 hr tablet 150 mg daily Patient takes 3 tabs daily for total 450mg       enalapril (VASOTEC) 20 MG tablet TAKE ONE TABLET BY MOUTH TWICE A  tablet 1     fenofibrate (LOFIBRA) 54 MG tablet TAKE TWO TABLETS BY MOUTH ONCE DAILY 180 tablet 1     hydrOXYzine (ATARAX) 25 MG tablet        lamoTRIgine (LAMICTAL) 200 MG tablet Take 200 mg by mouth       levothyroxine (SYNTHROID/LEVOTHROID) 100 MCG tablet Take 1 tablet (100 mcg) by mouth daily 30 tablet 11     multivitamin w/minerals (THERA-VIT-M) tablet Take 1 tablet by mouth daily       pravastatin (PRAVACHOL) 10 MG tablet TAKE ONE TABLET BY MOUTH ONCE DAILY 90 tablet 1     pravastatin (PRAVACHOL) 20 MG tablet Take 1 tablet (20 mg) by mouth daily 90 tablet 3     tadalafil (CIALIS) 20 MG tablet Take 1 tablet (20 mg) by mouth daily as needed (take 2 hours before intercourse) 30 tablet 11     tamsulosin (FLOMAX) 0.4 MG capsule Take 2 capsules (0.8 mg) by mouth every evening 180 capsule 1     testosterone (AXIRON) 30 MG/ACT topical solution Place 1 pump (30 mg) onto the skin daily 270 mL 5     traZODone (DESYREL) 50 MG tablet        FAMILY HISTORY:  Family History   Problem Relation Age of Onset     Depression Father      Hyperlipidemia Father      Depression Other         Multiple family members.     Heart Disease Maternal Grandmother      Myocardial Infarction Maternal Grandfather         Sudden MI / death at age 51.     Hyperlipidemia Maternal Grandfather      Obesity Paternal Grandmother      Hyperlipidemia Other         Multiple family members (Parents, brother, sister, PGM)     SOCIAL HISTORY:  Lives alone in basement apartment in [place] abut spends much of his time at his parents home in [facility] in the town he grew up in and where all his siblings still live.  Unemployed, on disability based on his depression diagnosis.  Sexually active, no single steady partner,  "mostly with other HIV seropositive men, but always discloses his status when with HIV seronegative partners and uses protection.  In the past, has visited friends in [place] for an extended stay.  Tobacco:  Long-standing, up to 2.5 ppd in the past.  Presently 1 ppd (but smokes only half of each cigarette).  Has quit for up to three weeks several times in the past; not interested in further attempts at reduction at present.  Has a treadmill in the basement of his parents home that he can use for indoor exercise during cold weather, although he does so infrequently.  His father was diagnosed with cancer in winter 2019-20.    REVIEW OF SYSTEMS:  As per HPI.    PHYSICAL EXAMINATION:  General:  A pleasant, conversant, WDWN 56-year-old man in no discomfort.  Vital Signs:  /81   Pulse (!) 124   Temp 98  F (36.7  C) (Oral)   Ht 1.753 m (5' 9\")   Wt 75.7 kg (166 lb 12.8 oz)   SpO2 96%   BMI 24.63 kg/m   (weight increased about five pounds since 1/2022).  Skin:  No acute rash or lesion.  Scattered chronic keratoses.  Head/Neck:  NCAT.  Old stable bilateral mild buccal facial lipoatrophy.  External auditory canals are patent bilaterally without discharge.  PERRL. EOMI.  Anicteric xclerae.  Conjunctivae are pink and without injection.  Oropharynx contains no erythema, exudate or lesion.  Dentition appears normal.  Neck is supple without lymphadenopathy or thyromegaly.  Lungs:  Bilaterally clear to auscultation.  CV:  RRR, normal S1, S2 without gallop, murmur or rub.  Abdomen:  Bowel sounds normal, soft, nontender, no hepatosplenomegaly, no mass, old surgical scar unchanged.  Back:  No lumbar or CVA tenderness.  Extremities:  Distally warm, no edema.  Neurological:  Alert, oriented, memory / cognition / affect intact.  Gait is normal.    LABORATORY STUDIES (Reviewed with the patient during his appointment today):      HIV-1 RNA copies/mL 08/12/2022 Not Detected  Not Detected Copies/mL Final     CD3% Total T Cells " 08/12/2022 90 (A) 49 - 84 % Final     Absolute CD3, Total T Cells 08/12/2022 2,512  603 - 2,990 cells/uL Final     CD4% [place] T Cells 08/12/2022 37  28 - 63 % Final     Absolute CD4, [place] T Cells 08/12/2022 1,034  441 - 2,156 cells/uL Final     CD8% Suppressor T Cells 08/12/2022 57 (A) 10 - 40 % Final     Absolute CD8, Suppressor T Cells 08/12/2022 1,597 (A) 125 - 1,312 cells/uL Final     CD4:CD8 Ratio 08/12/2022 0.65 (A) 1.40 - 2.60 Final     Sodium 08/12/2022 132 (A) 133 - 144 mmol/L Final     Potassium 08/12/2022 4.2  3.4 - 5.3 mmol/L Final     Chloride 08/12/2022 105  94 - 109 mmol/L Final     Carbon Dioxide (CO2) 08/12/2022 24  20 - 32 mmol/L Final     Anion Gap 08/12/2022 3  3 - 14 mmol/L Final     Urea Nitrogen 08/12/2022 18  7 - 30 mg/dL Final     Creatinine 08/12/2022 1.03  0.66 - 1.25 mg/dL Final     Calcium 08/12/2022 9.7  8.5 - 10.1 mg/dL Final     Glucose 08/12/2022 117 (A) 70 - 99 mg/dL Final     Alkaline Phosphatase 08/12/2022 117  40 - 150 U/L Final     AST 08/12/2022 16  0 - 45 U/L Final     ALT 08/12/2022 39  0 - 70 U/L Final     Protein Total 08/12/2022 7.9  6.8 - 8.8 g/dL Final     Albumin 08/12/2022 4.1  3.4 - 5.0 g/dL Final     Bilirubin Total 08/12/2022 0.6  0.2 - 1.3 mg/dL Final     GFR Estimate 08/12/2022 86  >60 mL/min/1.73m2 Final    Effective December 21, 2021 eGFRcr in adults is calculated using the 2021 CKD-EPI creatinine equation which includes age and gender (Michel et al., NEJM, DOI: 10.1056/MAQZsq5964821)     Lipase 08/12/2022 147  73 - 393 U/L Final     Cholesterol 08/12/2022 222 (A) <200 mg/dL Final     Triglycerides 08/12/2022 375 (A) <150 mg/dL Final     Direct Measure HDL 08/12/2022 48  >=40 mg/dL Final     LDL Cholesterol Calculated 08/12/2022 99  <=100 mg/dL Final     Non HDL Cholesterol 08/12/2022 174 (A) <130 mg/dL Final     Patient Fasting > 8hrs? 08/12/2022 Yes   Final     TSH 08/12/2022 0.14 (A) 0.40 - 4.00 mU/L Final     Testosterone Total 08/12/2022 452  240 - 950  ng/dL Final     WBC Count 08/12/2022 10.1  4.0 - 11.0 10e3/uL Final     RBC Count 08/12/2022 4.76  4.40 - 5.90 10e6/uL Final     Hemoglobin 08/12/2022 14.6  13.3 - 17.7 g/dL Final     Hematocrit 08/12/2022 42.4  40.0 - 53.0 % Final     MCV 08/12/2022 89  78 - 100 fL Final     MCH 08/12/2022 30.7  26.5 - 33.0 pg Final     MCHC 08/12/2022 34.4  31.5 - 36.5 g/dL Final     RDW 08/12/2022 12.9  10.0 - 15.0 % Final     Platelet Count 08/12/2022 328  150 - 450 10e3/uL Final     % Neutrophils 08/12/2022 60  % Final     % Lymphocytes 08/12/2022 30  % Final     % Monocytes 08/12/2022 6  % Final     % Eosinophils 08/12/2022 3  % Final     % Basophils 08/12/2022 1  % Final     % Immature Granulocytes 08/12/2022 0  % Final     NRBCs per 100 WBC 08/12/2022 0  <1 /100 Final     Absolute Neutrophils 08/12/2022 6.1  1.6 - 8.3 10e3/uL Final     Absolute Lymphocytes 08/12/2022 3.0  0.8 - 5.3 10e3/uL Final     Absolute Monocytes 08/12/2022 0.6  0.0 - 1.3 10e3/uL Final     Absolute Eosinophils 08/12/2022 0.3  0.0 - 0.7 10e3/uL Final     Absolute Basophils 08/12/2022 0.1  0.0 - 0.2 10e3/uL Final     Absolute Immature Granulocytes 08/12/2022 0.0  <=0.4 10e3/uL Final     Absolute NRBCs 08/12/2022 0.0  10e3/uL Final     Hemoglobin A1C 08/12/2022 5.7 (A) 0.0 - 5.6 % Final    Normal <5.7%   Prediabetes 5.7-6.4%    Diabetes 6.5% or higher     Note: Adopted from ADA consensus guidelines.     ASSESSMENT/PLAN:    1. Stably controlled, asymptomatic HIV infection:  He remains on Biktarvy (switched in 6/20 from long-standing Combivir / nevirapine to Biktarvy) with excellent dosing adherence and drug tolerance and with a persistently normal absolute CD4+ cell count and an undetectable HIV plasma viral load.  He will continue taking Biktarvy and return to [facility] for follow-up in six months (with HIV and other monitoring laboratory studies before that visit, ordered today), or as needed before then.  2. Mixed hyperlipidemia:  Off  fenofibrate since June 2022 (to determine if he really needed it at his request) his triglycerides level has increased to 427 and his total cholesterol is also higher at 266.  I will recommend he resume taking Tricor 108 mg (two 54 mg tablets) PO daily and increase his low pravastatin dose from 10 mg to 20 mg daily (apparently not done last autumn), since, so far, he tolerates pravastatin very well without any of the side effects he previously experienced with a different statin long ago.  He will phone if he notices new side effects, and we will recheck his fasting lipid profile again in six months.  3. Hypothyroidism on levothyroxine:  In 2020 - 21, his TSH level was below the lower limit of assay normal on long-standing 200 mcg daily, so the levothyroxine dose was decreased from 200 mcg to 100 mcg daily in 8/22, with now a better TSH level of 3.30 on 2/10/23 and a normal free T4 level of 1.48 on 2/10/23.  He has fatigue and has gained some weight, but there are other more likely explanations for those symptoms (such as sleep apnea, inactivity, and depression) than inadequate thyroid support.  I will phone him and recommend he continue taking 100 mcg of Synthroid daily.  4. Obstructive sleep apnea:  Having not tolerated a number fo other therapy attempts (including CPap), he is scheduled to have an Inspire device implanted by a physician in [place] on 2/23/23.  I have previously stated that he has no contraindication to that procedure.  5. Chronic, intractable depression / anxiety / insomnia / agoraphobia -- presently apparently worse:  Managed by Dr. Johnston, his psychiatrist in the [facility] in [place], who he sees often and who discontinued his lamotrigine medication.  Will defer to Dr. Johnston.  I encouraged Mr. Bee to try to exercise or walk daily.  6. Nocturia / presumed benign prostatism / history of Peyronie's disease:  He has had  symptoms consistent with BPH and had palpable prostatic  enlargement on 10/18/17 digital rectal exam with a normal PSA screen at that time, so has now been on Flomax 0.8 mg PO q evening (since 6/22 through Urology Clinic) with, apparently some improvement, since the nocturia and daytime frequency were again not complained of today.  He was seen by Dr. Irwin in Urology clinic on 6/3/22 and underwent cystoscopy which was unremarkable.  7. Well-controlled essential hypertension:  On ongoing amlodipine 10 mg PO daily (increased 5/17/17, started 5/20/15) and enalapril 20 mg PO BID (started 11/29/11 and dose increased 1/27/15) his clinic check-in blood pressures remain consistently good.  The current prescriptions were renewed today.  8. Testosterone replacement therapy / previous erectile dysfunction:  His most recent total testosterone level on 8/12/22 remains normal at 452 on ongoing Axiron transcutaneous testosterone topical replacement therapy, initially at two (30 mg) pumps (= 60 mg) daily (since late 1/15), but now at one pump daily since mid-2019.  Will continue the present dose.  9. 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  This was another reason he switched off zidovudine in summer 2020, although there has not been any evident progression of his lipodystrophy for a number of years.  10. History of prior, mild, mid-winter vitamin D deficiency:  He self-takes OTC vitamin D supplements during some white but not yet this year, although whether he even needs that is questionable, because his 10/12/18 vitamin D level was adequate at 20.  11. Mild psoriasis, presently in remission:  Well-controlled with topical Clobex prn.  Not discussed today.  12. Health Care Maintenance:  He has received five Covid-19 (Moderna) vaccinations on 4/11/21, 5/9/21, 11/28/21, 4/5/22, and (bivalent) 10/22/22.  Jynneos Mpox vaccines were given on 8/17/22 and 9/21/22.  Influenza  vaccine received 10/10/22.  Menactra vaccine given 11/18/15.  Tdap was boosted 1/21/15.  Prevnar 13 given 10/16/13; Pneumovax given 1/21/15.  Shingrix vaccinations were given on 8/17/22 and 10/12/22.  HAV / HBV immunized / seroimmune.  HCV negative 6/1/09.  Negative rectal Pap done by Dr. Sanchez 6/28/10.  Antitreponemal antibody positive since 8/27/09 but repeat RPR was again negative on 6/12/20.  Quantiferon Gold was negative 11/12/15.  A screening hemoglobin A1C (because he has had several slightly elevated random glucoses over the past several years) was normal at 5.6% on 6/14/19.  Sees a dentist annually.  Sees a sleep apnea specialist for Cpap.  Saw an ophthalmologist in early 2012.  Had a  non-[facility] dermatologist appointment for a complete skin check in early 2019 and was told he had only chronic lentiga without any concerning lesions.  Underwent a routine screening colonoscopy at [facility] in [place], [place], in 1/19 which was normal (without polyps, per the patient) except for diverticulosis  -- encouraged to intake more fiber.

## 2023-02-22 RX ORDER — TESTOSTERONE 30 MG/1.5ML
1 SOLUTION TOPICAL DAILY
Qty: 270 ML | Refills: 5 | Status: SHIPPED | OUTPATIENT
Start: 2023-02-22 | End: 2023-08-16

## 2023-02-22 RX ORDER — FENOFIBRATE 54 MG/1
108 TABLET ORAL DAILY
Qty: 180 TABLET | Refills: 3 | Status: SHIPPED | OUTPATIENT
Start: 2023-02-22 | End: 2023-08-16

## 2023-06-15 ENCOUNTER — PRE VISIT (OUTPATIENT)
Dept: UROLOGY | Facility: CLINIC | Age: 58
End: 2023-06-15
Payer: COMMERCIAL

## 2023-06-15 NOTE — CONFIDENTIAL NOTE
Reason for visit: follow-up     Relevant information: BPH with nocturia    Records/imaging/labs/orders: psa scheduled 6/16/2023    At Rooming: acacia Andino  6/15/2023  11:12 AM

## 2023-06-16 ENCOUNTER — LAB (OUTPATIENT)
Dept: LAB | Facility: CLINIC | Age: 58
End: 2023-06-16
Payer: COMMERCIAL

## 2023-06-16 DIAGNOSIS — R35.1 NOCTURIA: ICD-10-CM

## 2023-06-16 DIAGNOSIS — R97.20 ELEVATED PROSTATE SPECIFIC ANTIGEN (PSA): ICD-10-CM

## 2023-06-16 LAB — PSA SERPL DL<=0.01 NG/ML-MCNC: 2.52 NG/ML (ref 0–3.5)

## 2023-06-16 PROCEDURE — 84153 ASSAY OF PSA TOTAL: CPT | Performed by: PATHOLOGY

## 2023-06-16 PROCEDURE — 36415 COLL VENOUS BLD VENIPUNCTURE: CPT | Performed by: PATHOLOGY

## 2023-06-22 ENCOUNTER — OFFICE VISIT (OUTPATIENT)
Dept: UROLOGY | Facility: CLINIC | Age: 58
End: 2023-06-22
Payer: COMMERCIAL

## 2023-06-22 VITALS
WEIGHT: 163 LBS | DIASTOLIC BLOOD PRESSURE: 86 MMHG | BODY MASS INDEX: 24.14 KG/M2 | SYSTOLIC BLOOD PRESSURE: 131 MMHG | HEART RATE: 87 BPM | HEIGHT: 69 IN

## 2023-06-22 DIAGNOSIS — R97.20 ELEVATED PROSTATE SPECIFIC ANTIGEN (PSA): ICD-10-CM

## 2023-06-22 DIAGNOSIS — R35.1 BENIGN PROSTATIC HYPERPLASIA WITH NOCTURIA: ICD-10-CM

## 2023-06-22 DIAGNOSIS — N40.1 BENIGN PROSTATIC HYPERPLASIA WITH NOCTURIA: ICD-10-CM

## 2023-06-22 DIAGNOSIS — N52.9 ERECTILE DYSFUNCTION, UNSPECIFIED ERECTILE DYSFUNCTION TYPE: Primary | ICD-10-CM

## 2023-06-22 DIAGNOSIS — R35.0 URINARY FREQUENCY: ICD-10-CM

## 2023-06-22 PROCEDURE — 99214 OFFICE O/P EST MOD 30 MIN: CPT | Performed by: UROLOGY

## 2023-06-22 RX ORDER — LEVOTHYROXINE SODIUM 75 UG/1
TABLET ORAL
COMMUNITY
Start: 2022-08-08 | End: 2023-08-16

## 2023-06-22 RX ORDER — LAMOTRIGINE 25 MG/1
TABLET ORAL
COMMUNITY
Start: 2023-05-24 | End: 2023-08-16

## 2023-06-22 RX ORDER — TADALAFIL 20 MG/1
20 TABLET ORAL DAILY PRN
Qty: 30 TABLET | Refills: 11 | Status: SHIPPED | OUTPATIENT
Start: 2023-06-22

## 2023-06-22 ASSESSMENT — PAIN SCALES - GENERAL: PAINLEVEL: NO PAIN (0)

## 2023-06-22 NOTE — NURSING NOTE
"Chief Complaint   Patient presents with     Follow Up     1 year follow up; review PSA       Blood pressure 131/86, pulse 87, height 1.753 m (5' 9\"), weight 73.9 kg (163 lb). Body mass index is 24.07 kg/m .    Patient Active Problem List   Diagnosis     Need for prophylactic vaccination and inoculation against influenza     Human immunodeficiency virus I infection (H)     Depressive disorder, not elsewhere classified     Psoriatic arthritis (H)     Hypothyroidism     Hypertriglyceridemia     Essential hypertension     TATY (obstructive sleep apnea)     Mixed hyperlipidemia     Benign prostatic hyperplasia with nocturia       Allergies   Allergen Reactions     Iodinated Contrast Media Anaphylaxis     Amoxicillin-Pot Clavulanate Rash     Dye [Contrast Dye]      Iodine I 131 Tositumomab        Current Outpatient Medications   Medication Sig Dispense Refill     amLODIPine (NORVASC) 10 MG tablet Take 1 tablet (10 mg) by mouth daily 90 tablet 3     bictegravir-emtricitabine-tenofovir (BIKTARVY) -25 MG per tablet Take 1 tablet by mouth daily 90 tablet 3     buPROPion (WELLBUTRIN XL) 150 MG 24 hr tablet 150 mg daily Patient takes 3 tabs daily for total 450mg       enalapril (VASOTEC) 20 MG tablet Take 1 tablet (20 mg) by mouth 2 times daily 180 tablet 3     fenofibrate (LOFIBRA) 54 MG tablet Take 2 tablets (108 mg) by mouth daily 180 tablet 3     hydrOXYzine (ATARAX) 25 MG tablet        lamoTRIgine (LAMICTAL) 25 MG tablet        levothyroxine (SYNTHROID/LEVOTHROID) 100 MCG tablet Take 1 tablet (100 mcg) by mouth daily 90 tablet 3     levothyroxine (SYNTHROID/LEVOTHROID) 75 MCG tablet        multivitamin w/minerals (THERA-VIT-M) tablet Take 1 tablet by mouth daily       pravastatin (PRAVACHOL) 20 MG tablet Take 1 tablet (20 mg) by mouth daily 90 tablet 3     tadalafil (CIALIS) 20 MG tablet Take 1 tablet (20 mg) by mouth daily as needed (take 2 hours before intercourse) 30 tablet 11     tamsulosin (FLOMAX) 0.4 MG capsule " Take 2 capsules (0.8 mg) by mouth every evening 180 capsule 3     testosterone (AXIRON) 30 MG/ACT topical solution Place 1 pump (30 mg) onto the skin daily 270 mL 5     traZODone (DESYREL) 50 MG tablet          Social History     Tobacco Use     Smoking status: Every Day     Packs/day: 1.00     Types: Cigarettes     Smokeless tobacco: Never   Substance Use Topics     Alcohol use: Yes       Jason Stephens, EMT  6/22/2023  11:12 AM

## 2023-06-22 NOTE — PROGRESS NOTES
"HPI:  Dennys Bee is a 57 year old male being seen for  Follow-up urology concerns.  Last seen a year ago.    Erectile dysfunction, lower urinary tract symptoms, prostate cancer screening discussed today.    History of elevated PSA without prostate biopsy, PSA fell back to normal range with observation.    Office cystoscopy done last year- showed mild prostate enlargement.    Exam:  /86 (BP Location: Right arm, Patient Position: Sitting, Cuff Size: Adult Regular)   Pulse 87   Ht 1.753 m (5' 9\")   Wt 73.9 kg (163 lb)   BMI 24.07 kg/m      General: Alert, oriented, nad  Eyes: anicteric, EOMI.  Pulse: regular  Resps: normal, non-labored.  Abdomen:  nondistended.   exam; declines JERE.     Review of Imaging:  The following imaging exams were independently viewed and interpreted by me and discussed with patient:  N/A       Review of Labs:  The following labs were reviewed :    Lab Results   Component Value Date    PSA 2.52 06/16/2023    PSA 5.39 01/18/2022    PSA 1.06 04/13/2018    PSA 0.50 05/14/2008            Assessment & Plan   1. BPH, symptoms adequately controlled on alpha-blocker at this time.  a. Continue tamsulosin  2. Erectile dysfunction.  Discussed first and 2nd line treatment options.  a. Refilled Cialis prescription.  3. Prostate cancer screening- PSA normal.  He declines JERE today.      Return to clinic 1 year, sooner if needed.       Edwar Irwin MD  [facility] [place]      ==========================      Additional Coding Information:    Problems:  4 -- two or more stable chronic illnesses    Data Reviewed  Reviewed PSA trend as listed above.    Tests ordered: N/A     Level of risk:  4 -- prescription drug management    Time spent:  10 minutes spent by me on the date of the encounter doing chart review, history and exam, documentation and further activities per the note          "

## 2023-06-22 NOTE — LETTER
"6/22/2023       RE: Dennys Bee  [address]  [facility] [id number]     Dear Colleague,    Thank you for referring your patient, Dennys Bee, to the [facility] [place] at [facility]. Please see a copy of my visit note below.    HPI:  Dennys Bee is a 57 year old male being seen for  Follow-up urology concerns.  Last seen a year ago.    Erectile dysfunction, lower urinary tract symptoms, prostate cancer screening discussed today.    History of elevated PSA without prostate biopsy, PSA fell back to normal range with observation.    Office cystoscopy done last year- showed mild prostate enlargement.    Exam:  /86 (BP Location: Right arm, Patient Position: Sitting, Cuff Size: Adult Regular)   Pulse 87   Ht 1.753 m (5' 9\")   Wt 73.9 kg (163 lb)   BMI 24.07 kg/m      General: Alert, oriented, nad  Eyes: anicteric, EOMI.  Pulse: regular  Resps: normal, non-labored.  Abdomen:  nondistended.   exam; declines JERE.     Review of Imaging:  The following imaging exams were independently viewed and interpreted by me and discussed with patient:  N/A       Review of Labs:  The following labs were reviewed :    Lab Results   Component Value Date    PSA 2.52 06/16/2023    PSA 5.39 01/18/2022    PSA 1.06 04/13/2018    PSA 0.50 05/14/2008            Assessment & Plan   BPH, symptoms adequately controlled on alpha-blocker at this time.  Continue tamsulosin  Erectile dysfunction.  Discussed first and 2nd line treatment options.  Refilled Cialis prescription.  Prostate cancer screening- PSA normal.  He declines JERE today.      Return to clinic 1 year, sooner if needed.       Edwar Irwin MD  [facility] [place]      ==========================      Additional Coding Information:    Problems:  4 -- two or more stable chronic illnesses    Data Reviewed  Reviewed PSA trend as listed above.    Tests " ordered: N/A     Level of risk:  4 -- prescription drug management    Time spent:  10 minutes spent by me on the date of the encounter doing chart review, history and exam, documentation and further activities per the note

## 2023-06-22 NOTE — PATIENT INSTRUCTIONS
- Please schedule a 1-year follow up with Dr. Edwar Irwin.    It was a pleasure meeting with you today.  Thank you for allowing me and my team the privilege of caring for you today.  YOU are the reason we are here, and I truly hope we provided you with the excellent service you deserve.  Please let us know if there is anything else we can do for you so that we can be sure you are leaving completely satisfied with your care experience.

## 2023-07-17 DIAGNOSIS — Z21 HUMAN IMMUNODEFICIENCY VIRUS I INFECTION (H): ICD-10-CM

## 2023-07-17 RX ORDER — BICTEGRAVIR SODIUM, EMTRICITABINE, AND TENOFOVIR ALAFENAMIDE FUMARATE 50; 200; 25 MG/1; MG/1; MG/1
TABLET ORAL
Qty: 90 TABLET | Refills: 2 | Status: SHIPPED | OUTPATIENT
Start: 2023-07-17 | End: 2023-07-17

## 2023-07-18 DIAGNOSIS — Z21 HUMAN IMMUNODEFICIENCY VIRUS I INFECTION (H): ICD-10-CM

## 2023-07-18 RX ORDER — BICTEGRAVIR SODIUM, EMTRICITABINE, AND TENOFOVIR ALAFENAMIDE FUMARATE 50; 200; 25 MG/1; MG/1; MG/1
1 TABLET ORAL DAILY
Qty: 90 TABLET | Refills: 3 | COMMUNITY
Start: 2023-07-18 | End: 2024-02-19

## 2023-08-09 DIAGNOSIS — I10 ESSENTIAL HYPERTENSION: ICD-10-CM

## 2023-08-09 RX ORDER — ENALAPRIL MALEATE 20 MG/1
20 TABLET ORAL 2 TIMES DAILY
Qty: 180 TABLET | Refills: 1 | Status: SHIPPED | OUTPATIENT
Start: 2023-08-09 | End: 2023-08-16

## 2023-08-10 ASSESSMENT — ENCOUNTER SYMPTOMS
DIARRHEA: 1
ALTERED TEMPERATURE REGULATION: 0
POSTURAL DYSPNEA: 0
HALLUCINATIONS: 0
DECREASED CONCENTRATION: 1
POLYDIPSIA: 0
NECK MASS: 0
POOR WOUND HEALING: 0
WHEEZING: 0
ABDOMINAL PAIN: 0
SMELL DISTURBANCE: 0
HOARSE VOICE: 0
NIGHT SWEATS: 0
DYSPNEA ON EXERTION: 0
BOWEL INCONTINENCE: 0
DEPRESSION: 1
RECTAL PAIN: 0
DIFFICULTY URINATING: 1
INSOMNIA: 1
COUGH: 1
NAUSEA: 0
NERVOUS/ANXIOUS: 1
WEIGHT GAIN: 1
HEMATURIA: 0
BLOOD IN STOOL: 0
HEARTBURN: 0
SPUTUM PRODUCTION: 0
FLANK PAIN: 0
SINUS CONGESTION: 0
SKIN CHANGES: 0
SINUS PAIN: 0
SNORES LOUDLY: 1
PANIC: 1
POLYPHAGIA: 0
FATIGUE: 1
VOMITING: 0
HEMOPTYSIS: 0
DECREASED APPETITE: 0
CHILLS: 0
CONSTIPATION: 0
DYSURIA: 0
COUGH DISTURBING SLEEP: 1
JAUNDICE: 0
NAIL CHANGES: 0
TROUBLE SWALLOWING: 0
BLOATING: 0
TASTE DISTURBANCE: 0
SHORTNESS OF BREATH: 0
FEVER: 0
INCREASED ENERGY: 1
SORE THROAT: 0

## 2023-08-11 ENCOUNTER — LAB (OUTPATIENT)
Dept: LAB | Facility: CLINIC | Age: 58
End: 2023-08-11
Payer: COMMERCIAL

## 2023-08-11 DIAGNOSIS — E34.9 HYPOTESTOSTERONISM: ICD-10-CM

## 2023-08-11 DIAGNOSIS — Z21 HUMAN IMMUNODEFICIENCY VIRUS I INFECTION (H): ICD-10-CM

## 2023-08-11 DIAGNOSIS — E03.9 ACQUIRED HYPOTHYROIDISM: ICD-10-CM

## 2023-08-11 DIAGNOSIS — E78.2 MIXED HYPERLIPIDEMIA: ICD-10-CM

## 2023-08-11 LAB
ALBUMIN SERPL BCG-MCNC: 4.9 G/DL (ref 3.5–5.2)
ALP SERPL-CCNC: 74 U/L (ref 40–129)
ALT SERPL W P-5'-P-CCNC: 28 U/L (ref 0–70)
ANION GAP SERPL CALCULATED.3IONS-SCNC: 14 MMOL/L (ref 7–15)
AST SERPL W P-5'-P-CCNC: 21 U/L (ref 0–45)
BASOPHILS # BLD AUTO: 0.1 10E3/UL (ref 0–0.2)
BASOPHILS NFR BLD AUTO: 1 %
BILIRUB SERPL-MCNC: 0.6 MG/DL
BUN SERPL-MCNC: 17 MG/DL (ref 6–20)
CALCIUM SERPL-MCNC: 10 MG/DL (ref 8.6–10)
CD3 CELLS # BLD: 2934 CELLS/UL (ref 603–2990)
CD3 CELLS NFR BLD: 88 % (ref 49–84)
CD3+CD4+ CELLS # BLD: 1156 CELLS/UL (ref 441–2156)
CD3+CD4+ CELLS NFR BLD: 35 % (ref 28–63)
CD3+CD4+ CELLS/CD3+CD8+ CLL BLD: 0.6 % (ref 1.4–2.6)
CD3+CD8+ CELLS # BLD: 1919 CELLS/UL (ref 125–1312)
CD3+CD8+ CELLS NFR BLD: 58 % (ref 10–40)
CHLORIDE SERPL-SCNC: 103 MMOL/L (ref 98–107)
CHOLEST SERPL-MCNC: 263 MG/DL
CREAT SERPL-MCNC: 1.33 MG/DL (ref 0.67–1.17)
DEPRECATED HCO3 PLAS-SCNC: 20 MMOL/L (ref 22–29)
EOSINOPHIL # BLD AUTO: 0.3 10E3/UL (ref 0–0.7)
EOSINOPHIL NFR BLD AUTO: 3 %
ERYTHROCYTE [DISTWIDTH] IN BLOOD BY AUTOMATED COUNT: 13.3 % (ref 10–15)
GFR SERPL CREATININE-BSD FRML MDRD: 63 ML/MIN/1.73M2
GLUCOSE SERPL-MCNC: 136 MG/DL (ref 70–99)
HCT VFR BLD AUTO: 40.7 % (ref 40–53)
HDLC SERPL-MCNC: 48 MG/DL
HGB BLD-MCNC: 14.4 G/DL (ref 13.3–17.7)
IMM GRANULOCYTES # BLD: 0.1 10E3/UL
IMM GRANULOCYTES NFR BLD: 1 %
LDLC SERPL CALC-MCNC: 143 MG/DL
LIPASE SERPL-CCNC: 35 U/L (ref 13–60)
LYMPHOCYTES # BLD AUTO: 3.2 10E3/UL (ref 0.8–5.3)
LYMPHOCYTES NFR BLD AUTO: 30 %
MCH RBC QN AUTO: 30.8 PG (ref 26.5–33)
MCHC RBC AUTO-ENTMCNC: 35.4 G/DL (ref 31.5–36.5)
MCV RBC AUTO: 87 FL (ref 78–100)
MONOCYTES # BLD AUTO: 0.6 10E3/UL (ref 0–1.3)
MONOCYTES NFR BLD AUTO: 6 %
NEUTROPHILS # BLD AUTO: 6.2 10E3/UL (ref 1.6–8.3)
NEUTROPHILS NFR BLD AUTO: 59 %
NONHDLC SERPL-MCNC: 215 MG/DL
NRBC # BLD AUTO: 0 10E3/UL
NRBC BLD AUTO-RTO: 0 /100
PLATELET # BLD AUTO: 416 10E3/UL (ref 150–450)
POTASSIUM SERPL-SCNC: 4.4 MMOL/L (ref 3.4–5.3)
PROT SERPL-MCNC: 7.8 G/DL (ref 6.4–8.3)
RBC # BLD AUTO: 4.67 10E6/UL (ref 4.4–5.9)
SODIUM SERPL-SCNC: 137 MMOL/L (ref 136–145)
T CELL COMMENT: ABNORMAL
T PALLIDUM AB SER QL: REACTIVE
TRIGL SERPL-MCNC: 359 MG/DL
TSH SERPL DL<=0.005 MIU/L-ACNC: 4.48 UIU/ML (ref 0.3–4.2)
WBC # BLD AUTO: 10.4 10E3/UL (ref 4–11)

## 2023-08-11 PROCEDURE — 86780 TREPONEMA PALLIDUM: CPT | Performed by: INTERNAL MEDICINE

## 2023-08-11 PROCEDURE — 80053 COMPREHEN METABOLIC PANEL: CPT | Performed by: PATHOLOGY

## 2023-08-11 PROCEDURE — 99000 SPECIMEN HANDLING OFFICE-LAB: CPT | Performed by: PATHOLOGY

## 2023-08-11 PROCEDURE — 86360 T CELL ABSOLUTE COUNT/RATIO: CPT | Performed by: INTERNAL MEDICINE

## 2023-08-11 PROCEDURE — 80061 LIPID PANEL: CPT | Performed by: PATHOLOGY

## 2023-08-11 PROCEDURE — 86593 SYPHILIS TEST NON-TREP QUANT: CPT | Performed by: INTERNAL MEDICINE

## 2023-08-11 PROCEDURE — 36415 COLL VENOUS BLD VENIPUNCTURE: CPT | Performed by: PATHOLOGY

## 2023-08-11 PROCEDURE — 83690 ASSAY OF LIPASE: CPT | Performed by: PATHOLOGY

## 2023-08-11 PROCEDURE — 85025 COMPLETE CBC W/AUTO DIFF WBC: CPT | Performed by: PATHOLOGY

## 2023-08-11 PROCEDURE — 87536 HIV-1 QUANT&REVRSE TRNSCRPJ: CPT | Performed by: INTERNAL MEDICINE

## 2023-08-11 PROCEDURE — 86592 SYPHILIS TEST NON-TREP QUAL: CPT | Performed by: INTERNAL MEDICINE

## 2023-08-11 PROCEDURE — 84443 ASSAY THYROID STIM HORMONE: CPT | Performed by: PATHOLOGY

## 2023-08-11 PROCEDURE — 84403 ASSAY OF TOTAL TESTOSTERONE: CPT | Performed by: INTERNAL MEDICINE

## 2023-08-12 LAB — HIV1 RNA # PLAS NAA DL=20: NOT DETECTED COPIES/ML

## 2023-08-14 LAB
RPR SER QL: REACTIVE
RPR SER-TITR: NORMAL {TITER}

## 2023-08-16 ENCOUNTER — OFFICE VISIT (OUTPATIENT)
Dept: INFECTIOUS DISEASES | Facility: CLINIC | Age: 58
End: 2023-08-16
Attending: INTERNAL MEDICINE
Payer: COMMERCIAL

## 2023-08-16 VITALS
OXYGEN SATURATION: 96 % | SYSTOLIC BLOOD PRESSURE: 126 MMHG | BODY MASS INDEX: 25.13 KG/M2 | DIASTOLIC BLOOD PRESSURE: 80 MMHG | HEART RATE: 80 BPM | HEIGHT: 69 IN | WEIGHT: 169.7 LBS | TEMPERATURE: 97.9 F

## 2023-08-16 DIAGNOSIS — R53.82 CHRONIC FATIGUE: ICD-10-CM

## 2023-08-16 DIAGNOSIS — E34.9 HYPOTESTOSTERONISM: ICD-10-CM

## 2023-08-16 DIAGNOSIS — N40.1 BENIGN PROSTATIC HYPERPLASIA WITH NOCTURIA: ICD-10-CM

## 2023-08-16 DIAGNOSIS — R79.89 ELEVATED SERUM CREATININE: ICD-10-CM

## 2023-08-16 DIAGNOSIS — I10 ESSENTIAL HYPERTENSION: ICD-10-CM

## 2023-08-16 DIAGNOSIS — E78.2 MIXED HYPERLIPIDEMIA: ICD-10-CM

## 2023-08-16 DIAGNOSIS — R73.9 ELEVATED RANDOM BLOOD GLUCOSE LEVEL: ICD-10-CM

## 2023-08-16 DIAGNOSIS — Z21 HUMAN IMMUNODEFICIENCY VIRUS I INFECTION (H): Primary | ICD-10-CM

## 2023-08-16 DIAGNOSIS — E03.9 ACQUIRED HYPOTHYROIDISM: ICD-10-CM

## 2023-08-16 DIAGNOSIS — R35.1 BENIGN PROSTATIC HYPERPLASIA WITH NOCTURIA: ICD-10-CM

## 2023-08-16 LAB — TESTOST SERPL-MCNC: 535 NG/DL (ref 240–950)

## 2023-08-16 PROCEDURE — 99215 OFFICE O/P EST HI 40 MIN: CPT | Performed by: INTERNAL MEDICINE

## 2023-08-16 PROCEDURE — G0463 HOSPITAL OUTPT CLINIC VISIT: HCPCS | Performed by: INTERNAL MEDICINE

## 2023-08-16 RX ORDER — PRAVASTATIN SODIUM 40 MG
40 TABLET ORAL DAILY
Qty: 90 TABLET | Refills: 3 | Status: SHIPPED | OUTPATIENT
Start: 2023-08-16 | End: 2024-08-19

## 2023-08-16 RX ORDER — TAMSULOSIN HYDROCHLORIDE 0.4 MG/1
0.8 CAPSULE ORAL EVERY EVENING
Qty: 180 CAPSULE | Refills: 3 | Status: SHIPPED | OUTPATIENT
Start: 2023-08-16 | End: 2024-08-19

## 2023-08-16 RX ORDER — LEVOTHYROXINE SODIUM 150 UG/1
150 TABLET ORAL DAILY
Qty: 90 TABLET | Refills: 3 | Status: SHIPPED | OUTPATIENT
Start: 2023-08-16 | End: 2024-08-19

## 2023-08-16 RX ORDER — TESTOSTERONE 30 MG/1.5ML
1 SOLUTION TOPICAL DAILY
Qty: 270 ML | Refills: 5 | Status: SHIPPED | OUTPATIENT
Start: 2023-08-16 | End: 2024-03-11

## 2023-08-16 RX ORDER — AMLODIPINE BESYLATE 10 MG/1
10 TABLET ORAL DAILY
Qty: 90 TABLET | Refills: 3 | Status: SHIPPED | OUTPATIENT
Start: 2023-08-16 | End: 2024-08-19

## 2023-08-16 RX ORDER — FENOFIBRATE 54 MG/1
108 TABLET ORAL DAILY
Qty: 180 TABLET | Refills: 3 | Status: SHIPPED | OUTPATIENT
Start: 2023-08-16 | End: 2024-08-19

## 2023-08-16 RX ORDER — ENALAPRIL MALEATE 20 MG/1
20 TABLET ORAL 2 TIMES DAILY
Qty: 180 TABLET | Refills: 1 | Status: SHIPPED | OUTPATIENT
Start: 2023-08-16 | End: 2024-09-19

## 2023-08-16 NOTE — NURSING NOTE
"Chief Complaint   Patient presents with    RECHECK     B20     /80   Pulse 80   Temp 97.9  F (36.6  C) (Oral)   Ht 1.753 m (5' 9\")   Wt 77 kg (169 lb 11.2 oz)   SpO2 96%   BMI 25.06 kg/m        Octavia Jain MA   "

## 2023-08-16 NOTE — Clinical Note
8/16/2023       RE: Dennys Bee  [address] [facility] [id number]     Dear Colleague,    Thank you for referring your patient, Dennys Bee, to the [facility] INFECTIOUS DISEASE CLINIC [place] at [facility]. Please see a copy of my visit note below.    Answers submitted by the patient for this visit:  Symptoms you have experienced in the last 30 days (Submitted on 8/10/2023)  General Symptoms: Yes  Skin Symptoms: Yes  HENT Symptoms: Yes  EYE SYMPTOMS: No  HEART SYMPTOMS: No  LUNG SYMPTOMS: Yes  INTESTINAL SYMPTOMS: Yes  URINARY SYMPTOMS: Yes  REPRODUCTIVE SYMPTOMS: No  SKELETAL SYMPTOMS: No  BLOOD SYMPTOMS: No  NERVOUS SYSTEM SYMPTOMS: No  MENTAL HEALTH SYMPTOMS: Yes  Please answer the questions below to tell us what conditions you are experiencing: (Submitted on 8/10/2023)  Ear pain: No  Ear discharge: No  Hearing loss: No  Tinnitus: No  Nosebleeds: No  Congestion: No  Sinus pain: No  Trouble swallowing: No   Voice hoarseness: No  Mouth sores: No  Sore throat: No  Tooth pain: Yes  Gum tenderness: No  Bleeding gums: No  Change in taste: No  Change in sense of smell: No  Dry mouth: No  Hearing aid used: No  Neck lump: No  Please answer the questions below to tell us what conditions you are experiencing: (Submitted on 8/10/2023)  Fever: No  Loss of appetite: No  Weight gain: Yes  Fatigue: Yes  Night sweats: No  Chills: No  Increased stress: Yes  Excessive hunger: No  Excessive thirst: No  Feeling hot or cold when others believe the temperature is normal: No  Loss of height: No  Post-operative complications: No  Surgical site pain: No  Hallucinations: No  Change in or Loss of Energy: Yes  Hyperactivity: No  Confusion: No   (Submitted on 8/10/2023)  Changes in hair: No  Changes in moles/birth marks: No  Itching: Yes  Rashes: No  Changes in nails: No  Acne: No  Change in facial hair: No  Warts: No  Non-healing sores: No  Scarring:  No  Flaking of skin: No  Color changes of hands/feet in cold : No  Sun sensitivity: No  Skin thickening: No  Please answer the questions below to tell us what condition you are experiencing: (Submitted on 8/10/2023)  Cough: Yes  Sputum or phlegm: No  Coughing up blood: No  Difficulty breating or shortness of breath: No  Snoring: Yes  Wheezing: No  Difficulty breathing on exertion: No  Nighttime Cough: Yes  Difficulty breathing when lying flat: No  Please answer the questions below to tell us what conditions you are experiencing: (Submitted on 8/10/2023)  Heart burn or indigestion: No  Nausea: No  Vomiting: No  Abdominal pain: No  Bloating: No  Constipation: No  Diarrhea: Yes  Blood in stool: No  Black stools: No  Rectal or Anal pain: No  Fecal incontinence: No  Yellowing of skin or eyes: No  Vomit with blood: No  Change in stools: No  Please answer the questions below to tell us what condition you are experiencing: (Submitted on 8/10/2023)  Trouble holding urine or incontinence: No  Pain or burning: No  Trouble starting or stopping: No  Increased frequency of urination: Yes  Blood in urine: No  Decreased frequency of urination: No  Frequent nighttime urination: Yes  Flank pain: No  Difficulty emptying bladder: Yes  Please answer the questions below to tell us what condition you are experiencing: (Submitted on 8/10/2023)  Nervous or Anxious: Yes  Depression: Yes  Trouble sleeping: Yes  Trouble thinking or concentrating: Yes  Mood changes: Yes  Panic attacks: Yes        Division of Infectious Diseases and International Medicine  Department of Medicine        [facility] OUTPATIENT VISIT NOTE [place] [address]  [address] [place] [address]  Office: [phone]  Fax:  [phone]     HISTORY OF PRESENT ILLNESS:  Mr. Gadiel Bee is a 57 year old gentleman with asymptomatic HIV infection who today returns today to [facility] for semi-annual follow-up of ongoing antiretroviral  "therapy with Biktarvy one tablet PO daily (switched to simplify his dosing regimen in 8/20 from Combivir one tablet PO BID plus nevirapine 200 mg PO BID which were started in 5/03), as well as multiple other chronic health issues.  He misses Biktarvy doses only rarely and lacks noticed drug side effects.  He had HIV, drug toxicity, and other monitoring lab tests recently obtained on 8/16/23 showing his HIV plasma viral load remains undetectable with a high normal absolute CD4+ cell count.  His TSH was increased to 4.48, however, and his lipid studies were still elevated.  On pravastatin 20 mg daily his LDL cholesterol was 143.  He is tolerating the pravastatin without apparent side effects.  He discontinued his fenofibrate medication in 6/2022 but resumed it in 2/15/23 at 108 mg daily and his fasting triglycerides are still high (although decreased) at 359.  Although he is not exercising much at all, he continues to try to eat a heart-healthy diet by rarely eating fast food and trying to cook non-processed foods at home.  He has continue to avoid Covid-19 infection and has received five Moderna Covid-19 vaccination doses on 4/11/21, 5/9/21, 11/28/21, 4/5/22, and (bivalent) 10/22/22 -- he plans to receive another this October / November.  His primary complaint today is ongoing marked fatigue (\"lethargy\", \"lack of interest\", lack of capacity to get moving) of uncertain cause.  He believes that he became more fatigued after a primary care physician in the [place] area (whom he saw for a pre-operative clearance appointment) in mid-2022 decreased his levothyroxine dose from 200 mcg daily to 100 mcg daily.  He was hoping that placement of the Inspire implant at [facility] on 2/23/23 would improve his energy level -- he thinks he is perhaps sleeping a bit better and the implant showed fewer sleep interruptions by a post procedure follow-up sleep study (decreased from 18 interruptions per hour pre-implant down to 1.7 per hour " post-implant), but his fatigue has not changed.  He lacks the energy to go out for walks for exercise or to do much of anything beyond the absolute bear minimum activities.  His chronic depression is about the same as before, so it is not clear that is contributing to the increased fatigue.  He continues to be followed by his psychiatrist, Dr. Johnston in [place], who discontinued his lamotrigine prescription in 1/2023 for the possibility it might be contributing to somnolence and fatigue, but that change did not seem to have had any positive effect.  I am not clear if Dr. Johnston has started a new antidepressive medication within the past six months.  Mr. Bee remains on amlodipine and enalapril for hypertension with ongoing good blood pressure control (including a clinic check-in blood pressure today of 126/80).  He also continues to take tamsulosin 0.8 mg daily for BPH (manifested by nocturia and some mild daytime urinary frequency) with some benefit and continues to use Axiron topically for hypotestosteronism (with an 8/11/23 total testosterone level of 535).  He also reports today that he experiences intermittent, migrating, random patches of focal skin pruritus that make him want to scratch furiously.  These intense episodes come several times per week over the past several months, more often at night, but can occur any time.  The locations are not consistent -- sometimes on one or another or both arms or lateral hips, but sometimes also in other spots, usually only one area at a time and usually not last more than an hour or less.  There is never any visible rash or lesion, just the sensation of marked focal pruritus.  He does not perform regular skin moisterizing.  He has not identified any changed clothing, soaps, detergents, or other likely new contact items that could explain the pruritus.  Beyond all these concerns, he feels otherwise stably healthy of late and does not report additional concerns or  complaints today, including no recent febrile or EENT symptoms, cough, dyspnea, chest pain, GI or  symptoms, visible rash, or neurological symptoms.    PAST MEDICAL HISTORY:    HIV, diagnosed 7/93, previously treated with AZT, 3TC and Crixivan. He was then on AZT plus efavirenz, but in 5/03 began Combivir/Sustiva and has remained virologically suppressed on this regimen since.  Stable chronic stage 1 kidney disease:  Nephrology Clinic evaluation (including negative MRI scan) unremarkable in 2009.  Hyperlipidemia previously requiring multiple medications, more hypertriglyceridemia than hypercholesterolemia -- now on fenofibrate plus Pravachol which was added 4/18/18.   Hyperthyroidism on levothyroxine.   History of difficult-to-treat anxiety and depression -- followed for a number of years by psychiatrist Dr. Abebe Johnston in [place].   Hypertension treated with Vasotec.   Acute appendicitis with rupture in the summer of 2007.  Cholecystectomy in the summer of 2007.   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  Followed there annually.   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  Previously received Sculptra.   History of a low testosterone level, most recently checked in 1/09 with a normal free testosterone of 14.0 and a total testosterone in the normal range of 482, managed somewhat unorthodoxly by a now-retired Urologist until 7/13.  Since then, has continued taking biweekly home exogenous testosterone IM injections and has tapered them only marginally from ~ weekly to ~ every other week (or a bit less frequent).  Total testosterone level on 5/14/14 was 2926.  Syphilis diagnosed 5/08, peak titer of 1:256, completed a treatment course of IM penicillin times 3, last dose in 6/08.  Prior rectal warts, treated with Aldara  cream.  Infected lower left cracked mandibular molar tooth, repaired 6/10.  Unintentional overdose of alcohol, hydrocodone, and clonazepam for which hospitalized in [place] in 10/12.  Mild intermittent controlled extensor limb psoriasis.  Sleep apnea, diagnosed elsewhere.  Inspire implant inserted on 2/23/23.    Past Surgical History:   Procedure Laterality Date     APPENDECTOMY OPEN  Summer 2007.    For acute appendicitis with rupture.     CHOLECYSTECTOMY  2007.     ALLERGIES:  Penicillin and iodine.    CURRENT MEDICATIONS:  Current Outpatient Medications   Medication Sig Dispense Refill     amLODIPine (NORVASC) 10 MG tablet Take 1 tablet (10 mg) by mouth daily 90 tablet 3     bictegravir-emtricitabine-tenofovir (BIKTARVY) -25 MG per tablet Take 1 tablet by mouth daily 90 tablet 3     buPROPion (WELLBUTRIN XL) 150 MG 24 hr tablet 150 mg daily Patient takes 3 tabs daily for total 450mg       enalapril (VASOTEC) 20 MG tablet Take 1 tablet (20 mg) by mouth 2 times daily 180 tablet 1     fenofibrate (LOFIBRA) 54 MG tablet Take 2 tablets (108 mg) by mouth daily 180 tablet 3     hydrOXYzine (ATARAX) 25 MG tablet        lamoTRIgine (LAMICTAL) 25 MG tablet        levothyroxine (SYNTHROID/LEVOTHROID) 150 MCG tablet Take 1 tablet (150 mcg) by mouth daily 90 tablet 3     levothyroxine (SYNTHROID/LEVOTHROID) 75 MCG tablet        multivitamin w/minerals (THERA-VIT-M) tablet Take 1 tablet by mouth daily       pravastatin (PRAVACHOL) 40 MG tablet Take 1 tablet (40 mg) by mouth daily 90 tablet 3     tadalafil (CIALIS) 20 MG tablet Take 1 tablet (20 mg) by mouth daily as needed (take 2 hours before intercourse) 30 tablet 11     tamsulosin (FLOMAX) 0.4 MG capsule Take 2 capsules (0.8 mg) by mouth every evening 180 capsule 3     testosterone (AXIRON) 30 MG/ACT topical solution Place 1 pump (30 mg) onto the skin daily 270 mL 5     traZODone (DESYREL) 50 MG tablet        FAMILY HISTORY:  Family History   Problem Relation Age of  Onset     Depression Father      Hyperlipidemia Father      Depression Other         Multiple family members.     Heart Disease Maternal Grandmother      Myocardial Infarction Maternal Grandfather         Sudden MI / death at age 51.     Hyperlipidemia Maternal Grandfather      Obesity Paternal Grandmother      Hyperlipidemia Other         Multiple family members (Parents, brother, sister, PGM)     SOCIAL HISTORY:  Lives alone in basement apartment in [place] abut spends much of his time at his parents home in [place] in the town he grew up in and where all his siblings still live.  Unemployed, on disability based on his depression diagnosis.  Sexually active, no single steady partner, mostly with other HIV seropositive men, but always discloses his status when with HIV seronegative partners and uses protection.  In the past, has visited friends in [place] for an extended stay.  Tobacco:  Long-standing, up to 2.5 ppd in the past.  Presently 1 ppd (but smokes only half of each cigarette).  Has quit for up to three weeks several times in the past; not interested in further attempts at reduction at present.  Has a treadmill in the basement of his parents home that he can use for indoor exercise during cold weather, although he does so infrequently.  His father was diagnosed with cancer in winter 2019-20.    REVIEW OF SYSTEMS:  As per HPI.    Answers submitted by the patient for this visit:  Symptoms you have experienced in the last 30 days (Submitted on 8/10/2023)  General Symptoms: Yes  Skin Symptoms: Yes  HENT Symptoms: Yes  EYE SYMPTOMS: No  HEART SYMPTOMS: No  LUNG SYMPTOMS: Yes  INTESTINAL SYMPTOMS: Yes  URINARY SYMPTOMS: Yes  REPRODUCTIVE SYMPTOMS: No  SKELETAL SYMPTOMS: No  BLOOD SYMPTOMS: No  NERVOUS SYSTEM SYMPTOMS: No  MENTAL HEALTH SYMPTOMS: Yes  Please answer the questions below to tell us what conditions you are experiencing: (Submitted on 8/10/2023)  Ear pain: No  Ear discharge:  No  Hearing loss: No  Tinnitus: No  Nosebleeds: No  Congestion: No  Sinus pain: No  Trouble swallowing: No   Voice hoarseness: No  Mouth sores: No  Sore throat: No  Tooth pain: Yes  Gum tenderness: No  Bleeding gums: No  Change in taste: No  Change in sense of smell: No  Dry mouth: No  Hearing aid used: No  Neck lump: No  Please answer the questions below to tell us what conditions you are experiencing: (Submitted on 8/10/2023)  Fever: No  Loss of appetite: No  Weight gain: Yes  Fatigue: Yes  Night sweats: No  Chills: No  Increased stress: Yes  Excessive hunger: No  Excessive thirst: No  Feeling hot or cold when others believe the temperature is normal: No  Loss of height: No  Post-operative complications: No  Surgical site pain: No  Hallucinations: No  Change in or Loss of Energy: Yes  Hyperactivity: No  Confusion: No   (Submitted on 8/10/2023)  Changes in hair: No  Changes in moles/birth marks: No  Itching: Yes  Rashes: No  Changes in nails: No  Acne: No  Change in facial hair: No  Warts: No  Non-healing sores: No  Scarring: No  Flaking of skin: No  Color changes of hands/feet in cold : No  Sun sensitivity: No  Skin thickening: No  Please answer the questions below to tell us what condition you are experiencing: (Submitted on 8/10/2023)  Cough: Yes  Sputum or phlegm: No  Coughing up blood: No  Difficulty breating or shortness of breath: No  Snoring: Yes  Wheezing: No  Difficulty breathing on exertion: No  Nighttime Cough: Yes  Difficulty breathing when lying flat: No  Please answer the questions below to tell us what conditions you are experiencing: (Submitted on 8/10/2023)  Heart burn or indigestion: No  Nausea: No  Vomiting: No  Abdominal pain: No  Bloating: No  Constipation: No  Diarrhea: Yes  Blood in stool: No  Black stools: No  Rectal or Anal pain: No  Fecal incontinence: No  Yellowing of skin or eyes: No  Vomit with blood: No  Change in stools: No  Please answer the questions below to tell us what condition  "you are experiencing: (Submitted on 8/10/2023)  Trouble holding urine or incontinence: No  Pain or burning: No  Trouble starting or stopping: No  Increased frequency of urination: Yes  Blood in urine: No  Decreased frequency of urination: No  Frequent nighttime urination: Yes  Flank pain: No  Difficulty emptying bladder: Yes  Please answer the questions below to tell us what condition you are experiencing: (Submitted on 8/10/2023)  Nervous or Anxious: Yes  Depression: Yes  Trouble sleeping: Yes  Trouble thinking or concentrating: Yes  Mood changes: Yes  Panic attacks: Yes    PHYSICAL EXAMINATION:  General:  A pleasant, conversant, serious mein-ed, WDWN 57-year-old man in no discomfort.  Vital Signs:  /80   Pulse 80   Temp 97.9  F (36.6  C) (Oral)   Ht 1.753 m (5' 9\")   Wt 77 kg (169 lb 11.2 oz)   SpO2 96%   BMI 25.06 kg/m   (weight ~ stable versus 2/2023).  Skin:  No acute rash or lesion, no current pruritus or visible erythema or dryness over the lateral arms.  Scattered chronic keratoses.  Head/Neck:  NCAT.  Old stable bilateral mild buccal facial lipoatrophy.  External auditory canals lack discharge.  PERRL. EOMI.  Anicteric sclerae.  Conjunctivae are pink and uninjected.  Oropharynx lacks erythema, exudate or lesion.  Neck is supple, no lymphadenopathy or thyromegaly.  Chest:  Bilaterally clear to auscultation.  CV:  RRR, normal S1, S2 without gallop, murmur or rub.  Abdomen:  Bowel sounds active, soft, nontender, no hepatosplenomegaly, no mass, old surgical scar unchanged.  Back:  No low back or CVA tenderness.  Extremities:  Distally warm, no edema.  Neurological:  Alert, oriented, memory / cognition / affect intact.  Gait is normal.    LABORATORY STUDIES (Reviewed with the patient during his appointment today):    Component Date Value Ref Range Status     HIV-1 RNA copies/mL 08/11/2023 Not Detected  Not Detected Copies/mL Final     CD3% Total T Cells 08/11/2023 88 (H)  49 - 84 % Final     Absolute " CD3, Total T Cells 08/11/2023 2,934  603 - 2,990 cells/uL Final     CD4% [place] T Cells 08/11/2023 35  28 - 63 % Final     Absolute CD4, [place] T Cells 08/11/2023 1,156  441 - 2,156 cells/uL Final     CD8% Suppressor T Cells 08/11/2023 58 (H)  10 - 40 % Final     Absolute CD8, Suppressor T Cells 08/11/2023 1,919 (H)  125 - 1,312 cells/uL Final     CD4:CD8 Ratio 08/11/2023 0.60 (L)  1.40 - 2.60 Final     Sodium 08/11/2023 137  136 - 145 mmol/L Final     Potassium 08/11/2023 4.4  3.4 - 5.3 mmol/L Final     Chloride 08/11/2023 103  98 - 107 mmol/L Final     Carbon Dioxide (CO2) 08/11/2023 20 (L)  22 - 29 mmol/L Final     Anion Gap 08/11/2023 14  7 - 15 mmol/L Final     Urea Nitrogen 08/11/2023 17.0  6.0 - 20.0 mg/dL Final     Creatinine 08/11/2023 1.33 (H)  0.67 - 1.17 mg/dL Final     Calcium 08/11/2023 10.0  8.6 - 10.0 mg/dL Final     Glucose 08/11/2023 136 (H)  70 - 99 mg/dL Final     Alkaline Phosphatase 08/11/2023 74  40 - 129 U/L Final     AST 08/11/2023 21  0 - 45 U/L Final    Reference intervals for this test were updated on 6/12/2023 to more accurately reflect our healthy population. There may be differences in the flagging of prior results with similar values performed with this method. Interpretation of those prior results can be made in the context of the updated reference intervals.     ALT 08/11/2023 28  0 - 70 U/L Final    Reference intervals for this test were updated on 6/12/2023 to more accurately reflect our healthy population. There may be differences in the flagging of prior results with similar values performed with this method. Interpretation of those prior results can be made in the context of the updated reference intervals.       Protein Total 08/11/2023 7.8  6.4 - 8.3 g/dL Final     Albumin 08/11/2023 4.9  3.5 - 5.2 g/dL Final     Bilirubin Total 08/11/2023 0.6  <=1.2 mg/dL Final     GFR Estimate 08/11/2023 63  >60 mL/min/1.73m2 Final     TSH 08/11/2023 4.48 (H)  0.30 - 4.20 uIU/mL Final      Testosterone Total 08/11/2023 535  240 - 950 ng/dL Final     Lipase 08/11/2023 35  13 - 60 U/L Final     Cholesterol 08/11/2023 263 (H)  <200 mg/dL Final     Triglycerides 08/11/2023 359 (H)  <150 mg/dL Final     Direct Measure HDL 08/11/2023 48  >=40 mg/dL Final     LDL Cholesterol Calculated 08/11/2023 143 (H)  <=100 mg/dL Final     Non HDL Cholesterol 08/11/2023 215 (H)  <130 mg/dL Final     Treponema Antibody Total 08/11/2023 Reactive (A)  Nonreactive Final    The Anti-Treponema Antibody screening tends to remain positive for life, therefore it does not distinguish between active and past syphilis infections. All positive and equivocal results will automatically reflex to a non-specific Rapid Plasma Reagin (RPR) test.    If the Treponema Antibody is positive, and the RPR is positive, this is presumptive evidence of current infection, inadequately treated infection, persistent infection or reinfection.    If the Anti-Treponema Antibody is positive and the RPR is negative, then a second Treponema specific test (TP-PA) will be performed to determine whether the antibody test is falsely positive or is detecting early infection.  If the latter is suspected, repeat testing in approximately two weeks is recommended.     WBC Count 08/11/2023 10.4  4.0 - 11.0 10e3/uL Final     RBC Count 08/11/2023 4.67  4.40 - 5.90 10e6/uL Final     Hemoglobin 08/11/2023 14.4  13.3 - 17.7 g/dL Final     Hematocrit 08/11/2023 40.7  40.0 - 53.0 % Final     MCV 08/11/2023 87  78 - 100 fL Final     MCH 08/11/2023 30.8  26.5 - 33.0 pg Final     MCHC 08/11/2023 35.4  31.5 - 36.5 g/dL Final     RDW 08/11/2023 13.3  10.0 - 15.0 % Final     Platelet Count 08/11/2023 416  150 - 450 10e3/uL Final     % Neutrophils 08/11/2023 59  % Final     % Lymphocytes 08/11/2023 30  % Final     % Monocytes 08/11/2023 6  % Final     % Eosinophils 08/11/2023 3  % Final     % Basophils 08/11/2023 1  % Final     % Immature Granulocytes 08/11/2023 1  % Final      NRBCs per 100 WBC 08/11/2023 0  <1 /100 Final     Absolute Neutrophils 08/11/2023 6.2  1.6 - 8.3 10e3/uL Final     Absolute Lymphocytes 08/11/2023 3.2  0.8 - 5.3 10e3/uL Final     Absolute Monocytes 08/11/2023 0.6  0.0 - 1.3 10e3/uL Final     Absolute Eosinophils 08/11/2023 0.3  0.0 - 0.7 10e3/uL Final     Absolute Basophils 08/11/2023 0.1  0.0 - 0.2 10e3/uL Final     Absolute Immature Granulocytes 08/11/2023 0.1  <=0.4 10e3/uL Final     Absolute NRBCs 08/11/2023 0.0  10e3/uL Final     Rapid Plasma Reagin 08/11/2023 Reactive (A)  Nonreactive Final     Rapid Plasma Reagin Titer 08/11/2023 1:1  Non Reactive Final     ASSESSMENT/PLAN:    Stable, well-controlled HIV infection:  Mr. Bee takes Biktarvy (switched in 6/20 from long-standing Combivir / nevirapine) with tight dosing adherence and good drug tolerance.  His 8/11/23 monitoring labs show a persistently normal absolute CD4+ cell count and an undetectable HIV plasma viral load.  He will remain on Biktarvy and return to [facility] for follow-up in six months (with HIV and other monitoring laboratory studies before that visit, ordered today), or as needed before then.  Persistent mixed hyperlipidemia:  He resumed fenofibrate 108 mg (two 54 mg tablets) PO daily at his most recent past 2/15/23 appointment here and also has been on pravastatin increased from 10 mg to 20 mg daily at that appointment, but still has a quite elevated fasting total cholesterol (263), LDL cholesterol (143) and triglycerides (359) although the triglycerides are somewhat improved.  He has tolerated both drugs well so far.  He will remain on the fenofibrate and increase the pravastatin dose to 40 mg PO daily (with a repeat lipid panel in one to two months from now).  Hypothyroidism on levothyroxine:  In 2020 - 21, his TSH level was below the lower limit of assay normal on long-standing 200 mcg daily, so the levothyroxine dose was decreased from 200 mcg to 100 mcg daily in 8/22.   At that dose, he feels quite fatigued (although whether the fatigue is thyroid-related is uncertain) and the most recent 8/11/23 TSH was mildly high at 4.48.  He has also gained a little weight in the past year (although is not exercising to any degree).  We will raise his levothyroxine dose up to 150 mcg daily today and recheck a TSH in about 1 - 1.5 months.  If that is not lower and his fatigue is not better, will then consider a further rise back up to 200 mcg daily.  Obstructive sleep apnea s/p placement of Inspire device:  Having not tolerated a number fo other therapy attempts (including CPap), he had  an Inspire device implanted by a physician at [facility] in [place] on 2/23/23.  He does not feel more energetic, but a follow-up sleep study at [facility] showed a marked decrease in the number of sleep interruptions per hour (from 18 pre- to 1.7 post-Insp[place]).  Random intermittent episodes of focal pruritus:  The etiology is unclear.  The pattern of migratory locations is not suggestive of a contact etiology and he can not think of a potential substance of exposure anyway.  The sites do not correspond to his known mild psoriasis -- the pruritic locations do not have visible rash.  His syphilis screen on 8/11/23 is negative (with a stable RPR titer) and this pattern of pruritus does not seem consistent with hypothyroidism.  He will try more consistent (daily) moisturizing (with cream or baby oil) to determine if that prevents these attacks of pruritus.  Elevated creatinine:  The 8/11/23 random creatinine was 1.33, increased from 1.20 on 2/10/23.  His BUN suggests he may have been a bit dry that day.  Will recheck the creatinine with in 1 - 1.5 months.  Chronic, intractable depression / anxiety / insomnia / agoraphobia -- presently apparently worse:  Managed by Dr. Johnston, his psychiatrist in the [facility] in [place], who he sees often and who discontinued his lamotrigine medication.  Will defer to  Dr. Johnston.    As usual, I encouraged Mr. Bee to try to exercise or walk daily.  Nocturia / presumed benign prostatism / history of Peyronie's disease:  He has had  symptoms consistent with BPH and had palpable prostatic enlargement on 10/18/17 digital rectal exam with a normal PSA screen at that time, so has now been on Flomax 0.8 mg PO q evening (since 6/22 through [facility]) with, apparently some improvement, since the nocturia and daytime frequency were again not complained of today.  He was seen by Dr. Irwin in Urology clinic on 6/3/22 and underwent cystoscopy which was unremarkable.  Well-controlled essential hypertension:  On ongoing amlodipine 10 mg PO daily (increased 5/17/17, started 5/20/15) and enalapril 20 mg PO BID (started 11/29/11 and dose increased 1/27/15) his clinic check-in blood pressures remain consistently good.  The current prescriptions were renewed today.  Testosterone replacement therapy / previous erectile dysfunction:  His most recent total testosterone level on 8/12/22 remains normal at 452 on ongoing Axiron transcutaneous testosterone topical replacement therapy, initially at two (30 mg) pumps (= 60 mg) daily (since late 1/15), but now at one pump daily since mid-2019.  The 8/11/23 total testosterone level was good at 535.  Will continue the present dose -- prescription renewed today.  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  This was another reason he switched off zidovudine in summer 2020, although there has not been any evident progression of his lipodystrophy for a number of years.  History of prior, mild, mid-winter vitamin D deficiency:  He self-takes OTC vitamin D supplements during some white but has not in the past year, although whether he even needs that is questionable, because his 10/12/18 vitamin D level was adequate at 20.  Mild psoriasis, presently  in remission:  Well-controlled with topical Clobex prn.  Not discussed today.  Health Care Maintenance:  He has received five Covid-19 (Moderna) vaccinations on 4/11/21, 5/9/21, 11/28/21, 4/5/22, and (bivalent) 10/22/22 -- encouraged to obtain another booster this October.  Jynneos Mpox vaccines were given on 8/17/22 and 9/21/22.  Influenza vaccine received 10/10/22 -- will up date again this autumn.  Menactra vaccine given 11/18/15.  Tdap was boosted 1/21/15.  Prevnar 13 given 10/16/13; Pneumovax given 1/21/15.  Shingrix vaccinations were given on 8/17/22 and 10/12/22.  HAV / HBV immunized / seroimmune.  HCV negative 6/1/09.  Negative rectal Pap done by Dr. Sanchez 6/28/10.  Antitreponemal antibody positive since 8/27/09 but repeat RPR was again (nearly) negative on 8/11/23 with a titer of 1:1 (one dilution off from non-reactive, which is within assay variability) -- will monitor.  Quantiferon Gold was negative 11/12/15.  A screening hemoglobin A1C (because he has had several slightly elevated random glucoses over the past several years) was normal at 5.6% on 6/14/19, but will repeat that again with his next blood draw  Sees a dentist annually.  Sees a sleep apnea specialist for Cpap.  Saw an ophthalmologist in early 2012.  Had a  non-[facility] dermatologist appointment for a complete skin check in early 2019 and was told he had only chronic lentiga without any concerning lesions.  Underwent a routine screening colonoscopy at [facility] in [place], [place], in 1/19 which was normal (without polyps, per the patient) except for diverticulosis  -- encouraged to intake more fiber.        Again, thank you for allowing me to participate in the care of your patient.      Sincerely,    Rc Latham MD

## 2023-08-17 ENCOUNTER — TELEPHONE (OUTPATIENT)
Dept: INFECTIOUS DISEASES | Facility: CLINIC | Age: 58
End: 2023-08-17
Payer: COMMERCIAL

## 2023-08-17 NOTE — PROGRESS NOTES
"  Division of Infectious Diseases and International Medicine  Department of Medicine        [facility] OUTPATIENT VISIT NOTE [place] [address]  [address] [place] [address]  Office: [phone]  Fax:  [phone]     HISTORY OF PRESENT ILLNESS:  Mr. Gadiel Bee is a 57 year old gentleman with asymptomatic HIV infection who today returns today to [facility] for semi-annual follow-up of ongoing antiretroviral therapy with Biktarvy one tablet PO daily (switched to simplify his dosing regimen in 8/20 from Combivir one tablet PO BID plus nevirapine 200 mg PO BID which were started in 5/03), as well as multiple other chronic health issues.  He misses Biktarvy doses only rarely and lacks noticed drug side effects.  He had HIV, drug toxicity, and other monitoring lab tests recently obtained on 8/16/23 showing his HIV plasma viral load remains undetectable with a high normal absolute CD4+ cell count.  His TSH was increased to 4.48, however, and his lipid studies were still elevated.  On pravastatin 20 mg daily his LDL cholesterol was 143.  He is tolerating the pravastatin without apparent side effects.  He discontinued his fenofibrate medication in 6/2022 but resumed it in 2/15/23 at 108 mg daily and his fasting triglycerides are still high (although decreased) at 359.  Although he is not exercising much at all, he continues to try to eat a heart-healthy diet by rarely eating fast food and trying to cook non-processed foods at home.  He has continue to avoid Covid-19 infection and has received five Moderna Covid-19 vaccination doses on 4/11/21, 5/9/21, 11/28/21, 4/5/22, and (bivalent) 10/22/22 -- he plans to receive another this October / November.  His primary complaint today is ongoing marked fatigue (\"lethargy\", \"lack of interest\", lack of capacity to get moving) of uncertain cause.  He believes that he became more fatigued after a primary care physician in the [place] area " (whom he saw for a pre-operative clearance appointment) in mid-2022 decreased his levothyroxine dose from 200 mcg daily to 100 mcg daily.  He was hoping that placement of the Inspire implant at [facility] on 2/23/23 would improve his energy level -- he thinks he is perhaps sleeping a bit better and the implant showed fewer sleep interruptions by a post procedure follow-up sleep study (decreased from 18 interruptions per hour pre-implant down to 1.7 per hour post-implant), but his fatigue has not changed.  He lacks the energy to go out for walks for exercise or to do much of anything beyond the absolute bear minimum activities.  His chronic depression is about the same as before, so it is not clear that is contributing to the increased fatigue.  He continues to be followed by his psychiatrist, Dr. Johnston in [place], who discontinued his lamotrigine prescription in 1/2023 for the possibility it might be contributing to somnolence and fatigue, but that change did not seem to have had any positive effect.  I am not clear if Dr. Johnston has started a new antidepressive medication within the past six months.  Mr. Bee remains on amlodipine and enalapril for hypertension with ongoing good blood pressure control (including a clinic check-in blood pressure today of 126/80).  He also continues to take tamsulosin 0.8 mg daily for BPH (manifested by nocturia and some mild daytime urinary frequency) with some benefit and continues to use Axiron topically for hypotestosteronism (with an 8/11/23 total testosterone level of 535).  He also reports today that he experiences intermittent, migrating, random patches of focal skin pruritus that make him want to scratch furiously.  These intense episodes come several times per week over the past several months, more often at night, but can occur any time.  The locations are not consistent -- sometimes on one or another or both arms or lateral hips, but sometimes also in other spots, usually  only one area at a time and usually not last more than an hour or less.  There is never any visible rash or lesion, just the sensation of marked focal pruritus.  He does not perform regular skin moisterizing.  He has not identified any changed clothing, soaps, detergents, or other likely new contact items that could explain the pruritus.  Beyond all these concerns, he feels otherwise stably healthy of late and does not report additional concerns or complaints today, including no recent febrile or EENT symptoms, cough, dyspnea, chest pain, GI or  symptoms, visible rash, or neurological symptoms.    PAST MEDICAL HISTORY:    HIV, diagnosed 7/93, previously treated with AZT, 3TC and Crixivan. He was then on AZT plus efavirenz, but in 5/03 began Combivir/Sustiva and has remained virologically suppressed on this regimen since.  Stable chronic stage 1 kidney disease:  Nephrology Clinic evaluation (including negative MRI scan) unremarkable in 2009.  Hyperlipidemia previously requiring multiple medications, more hypertriglyceridemia than hypercholesterolemia -- now on fenofibrate plus Pravachol which was added 4/18/18.   Hyperthyroidism on levothyroxine.   History of difficult-to-treat anxiety and depression -- followed for a number of years by psychiatrist Dr. Abebe Johnston in [place].   Hypertension treated with Vasotec.   Acute appendicitis with rupture in the summer of 2007.  Cholecystectomy in the summer of 2007.   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  Followed there annually.   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  Previously received Sculptra.   History of a low testosterone level, most recently checked in 1/09 with a normal free testosterone of 14.0 and a total testosterone in the normal range of 482, managed somewhat  unorthodoxly by a now-retired Urologist until 7/13.  Since then, has continued taking biweekly home exogenous testosterone IM injections and has tapered them only marginally from ~ weekly to ~ every other week (or a bit less frequent).  Total testosterone level on 5/14/14 was 2926.  Syphilis diagnosed 5/08, peak titer of 1:256, completed a treatment course of IM penicillin times 3, last dose in 6/08.  Prior rectal warts, treated with Aldara cream.  Infected lower left cracked mandibular molar tooth, repaired 6/10.  Unintentional overdose of alcohol, hydrocodone, and clonazepam for which hospitalized in [place] in 10/12.  Mild intermittent controlled extensor limb psoriasis.  Sleep apnea, diagnosed elsewhere.  Inspire implant inserted on 2/23/23.    Past Surgical History:   Procedure Laterality Date    APPENDECTOMY OPEN  Summer 2007.    For acute appendicitis with rupture.    CHOLECYSTECTOMY  2007.     ALLERGIES:  Penicillin and iodine.    CURRENT MEDICATIONS:  Current Outpatient Medications   Medication Sig Dispense Refill    amLODIPine (NORVASC) 10 MG tablet Take 1 tablet (10 mg) by mouth daily 90 tablet 3    bictegravir-emtricitabine-tenofovir (BIKTARVY) -25 MG per tablet Take 1 tablet by mouth daily 90 tablet 3    buPROPion (WELLBUTRIN XL) 150 MG 24 hr tablet 150 mg daily Patient takes 3 tabs daily for total 450mg      enalapril (VASOTEC) 20 MG tablet Take 1 tablet (20 mg) by mouth 2 times daily 180 tablet 1    fenofibrate (LOFIBRA) 54 MG tablet Take 2 tablets (108 mg) by mouth daily 180 tablet 3    hydrOXYzine (ATARAX) 25 MG tablet       lamoTRIgine (LAMICTAL) 25 MG tablet       levothyroxine (SYNTHROID/LEVOTHROID) 150 MCG tablet Take 1 tablet (150 mcg) by mouth daily 90 tablet 3    levothyroxine (SYNTHROID/LEVOTHROID) 75 MCG tablet       multivitamin w/minerals (THERA-VIT-M) tablet Take 1 tablet by mouth daily      pravastatin (PRAVACHOL) 40 MG tablet Take 1 tablet (40 mg) by mouth daily 90 tablet 3     tadalafil (CIALIS) 20 MG tablet Take 1 tablet (20 mg) by mouth daily as needed (take 2 hours before intercourse) 30 tablet 11    tamsulosin (FLOMAX) 0.4 MG capsule Take 2 capsules (0.8 mg) by mouth every evening 180 capsule 3    testosterone (AXIRON) 30 MG/ACT topical solution Place 1 pump (30 mg) onto the skin daily 270 mL 5    traZODone (DESYREL) 50 MG tablet        FAMILY HISTORY:  Family History   Problem Relation Age of Onset    Depression Father     Hyperlipidemia Father     Depression Other         Multiple family members.    Heart Disease Maternal Grandmother     Myocardial Infarction Maternal Grandfather         Sudden MI / death at age 51.    Hyperlipidemia Maternal Grandfather     Obesity Paternal Grandmother     Hyperlipidemia Other         Multiple family members (Parents, brother, sister, PGM)     SOCIAL HISTORY:  Lives alone in basement apartment in [place] abut spends much of his time at his parents home in [facility] [place] [facility] in the town he grew up in and where all his siblings still live.  Unemployed, on disability based on his depression diagnosis.  Sexually active, no single steady partner, mostly with other HIV seropositive men, but always discloses his status when with HIV seronegative partners and uses protection.  In the past, has visited friends in [place] for an extended stay.  Tobacco:  Long-standing, up to 2.5 ppd in the past.  Presently 1 ppd (but smokes only half of each cigarette).  Has quit for up to three weeks several times in the past; not interested in further attempts at reduction at present.  Has a treadmill in the basement of his parents home that he can use for indoor exercise during cold weather, although he does so infrequently.  His father was diagnosed with cancer in winter 2019-20.    REVIEW OF SYSTEMS:  As per HPI.    Answers submitted by the patient for this visit:  Symptoms you have experienced in the last 30 days (Submitted on 8/10/2023)  General Symptoms:  Yes  Skin Symptoms: Yes  HENT Symptoms: Yes  EYE SYMPTOMS: No  HEART SYMPTOMS: No  LUNG SYMPTOMS: Yes  INTESTINAL SYMPTOMS: Yes  URINARY SYMPTOMS: Yes  REPRODUCTIVE SYMPTOMS: No  SKELETAL SYMPTOMS: No  BLOOD SYMPTOMS: No  NERVOUS SYSTEM SYMPTOMS: No  MENTAL HEALTH SYMPTOMS: Yes  Please answer the questions below to tell us what conditions you are experiencing: (Submitted on 8/10/2023)  Ear pain: No  Ear discharge: No  Hearing loss: No  Tinnitus: No  Nosebleeds: No  Congestion: No  Sinus pain: No  Trouble swallowing: No   Voice hoarseness: No  Mouth sores: No  Sore throat: No  Tooth pain: Yes  Gum tenderness: No  Bleeding gums: No  Change in taste: No  Change in sense of smell: No  Dry mouth: No  Hearing aid used: No  Neck lump: No  Please answer the questions below to tell us what conditions you are experiencing: (Submitted on 8/10/2023)  Fever: No  Loss of appetite: No  Weight gain: Yes  Fatigue: Yes  Night sweats: No  Chills: No  Increased stress: Yes  Excessive hunger: No  Excessive thirst: No  Feeling hot or cold when others believe the temperature is normal: No  Loss of height: No  Post-operative complications: No  Surgical site pain: No  Hallucinations: No  Change in or Loss of Energy: Yes  Hyperactivity: No  Confusion: No   (Submitted on 8/10/2023)  Changes in hair: No  Changes in moles/birth marks: No  Itching: Yes  Rashes: No  Changes in nails: No  Acne: No  Change in facial hair: No  Warts: No  Non-healing sores: No  Scarring: No  Flaking of skin: No  Color changes of hands/feet in cold : No  Sun sensitivity: No  Skin thickening: No  Please answer the questions below to tell us what condition you are experiencing: (Submitted on 8/10/2023)  Cough: Yes  Sputum or phlegm: No  Coughing up blood: No  Difficulty breating or shortness of breath: No  Snoring: Yes  Wheezing: No  Difficulty breathing on exertion: No  Nighttime Cough: Yes  Difficulty breathing when lying flat: No  Please answer the questions below to  "tell us what conditions you are experiencing: (Submitted on 8/10/2023)  Heart burn or indigestion: No  Nausea: No  Vomiting: No  Abdominal pain: No  Bloating: No  Constipation: No  Diarrhea: Yes  Blood in stool: No  Black stools: No  Rectal or Anal pain: No  Fecal incontinence: No  Yellowing of skin or eyes: No  Vomit with blood: No  Change in stools: No  Please answer the questions below to tell us what condition you are experiencing: (Submitted on 8/10/2023)  Trouble holding urine or incontinence: No  Pain or burning: No  Trouble starting or stopping: No  Increased frequency of urination: Yes  Blood in urine: No  Decreased frequency of urination: No  Frequent nighttime urination: Yes  Flank pain: No  Difficulty emptying bladder: Yes  Please answer the questions below to tell us what condition you are experiencing: (Submitted on 8/10/2023)  Nervous or Anxious: Yes  Depression: Yes  Trouble sleeping: Yes  Trouble thinking or concentrating: Yes  Mood changes: Yes  Panic attacks: Yes    PHYSICAL EXAMINATION:  General:  A pleasant, conversant, serious mein-ed, WDWN 57-year-old man in no discomfort.  Vital Signs:  /80   Pulse 80   Temp 97.9  F (36.6  C) (Oral)   Ht 1.753 m (5' 9\")   Wt 77 kg (169 lb 11.2 oz)   SpO2 96%   BMI 25.06 kg/m   (weight ~ stable versus 2/2023).  Skin:  No acute rash or lesion, no current pruritus or visible erythema or dryness over the lateral arms.  Scattered chronic keratoses.  Head/Neck:  NCAT.  Old stable bilateral mild buccal facial lipoatrophy.  External auditory canals lack discharge.  PERRL. EOMI.  Anicteric sclerae.  Conjunctivae are pink and uninjected.  Oropharynx lacks erythema, exudate or lesion.  Neck is supple, no lymphadenopathy or thyromegaly.  Chest:  Bilaterally clear to auscultation.  CV:  RRR, normal S1, S2 without gallop, murmur or rub.  Abdomen:  Bowel sounds active, soft, nontender, no hepatosplenomegaly, no mass, old surgical scar unchanged.  Back:  No low " back or CVA tenderness.  Extremities:  Distally warm, no edema.  Neurological:  Alert, oriented, memory / cognition / affect intact.  Gait is normal.    LABORATORY STUDIES (Reviewed with the patient during his appointment today):    Component Date Value Ref Range Status    HIV-1 RNA copies/mL 08/11/2023 Not Detected  Not Detected Copies/mL Final    CD3% Total T Cells 08/11/2023 88 (H)  49 - 84 % Final    Absolute CD3, Total T Cells 08/11/2023 2,934  603 - 2,990 cells/uL Final    CD4% [place] T Cells 08/11/2023 35  28 - 63 % Final    Absolute CD4, [place] T Cells 08/11/2023 1,156  441 - 2,156 cells/uL Final    CD8% Suppressor T Cells 08/11/2023 58 (H)  10 - 40 % Final    Absolute CD8, Suppressor T Cells 08/11/2023 1,919 (H)  125 - 1,312 cells/uL Final    CD4:CD8 Ratio 08/11/2023 0.60 (L)  1.40 - 2.60 Final    Sodium 08/11/2023 137  136 - 145 mmol/L Final    Potassium 08/11/2023 4.4  3.4 - 5.3 mmol/L Final    Chloride 08/11/2023 103  98 - 107 mmol/L Final    Carbon Dioxide (CO2) 08/11/2023 20 (L)  22 - 29 mmol/L Final    Anion Gap 08/11/2023 14  7 - 15 mmol/L Final    Urea Nitrogen 08/11/2023 17.0  6.0 - 20.0 mg/dL Final    Creatinine 08/11/2023 1.33 (H)  0.67 - 1.17 mg/dL Final    Calcium 08/11/2023 10.0  8.6 - 10.0 mg/dL Final    Glucose 08/11/2023 136 (H)  70 - 99 mg/dL Final    Alkaline Phosphatase 08/11/2023 74  40 - 129 U/L Final    AST 08/11/2023 21  0 - 45 U/L Final    Reference intervals for this test were updated on 6/12/2023 to more accurately reflect our healthy population. There may be differences in the flagging of prior results with similar values performed with this method. Interpretation of those prior results can be made in the context of the updated reference intervals.    ALT 08/11/2023 28  0 - 70 U/L Final    Reference intervals for this test were updated on 6/12/2023 to more accurately reflect our healthy population. There may be differences in the flagging of prior results with similar values  performed with this method. Interpretation of those prior results can be made in the context of the updated reference intervals.      Protein Total 08/11/2023 7.8  6.4 - 8.3 g/dL Final    Albumin 08/11/2023 4.9  3.5 - 5.2 g/dL Final    Bilirubin Total 08/11/2023 0.6  <=1.2 mg/dL Final    GFR Estimate 08/11/2023 63  >60 mL/min/1.73m2 Final    TSH 08/11/2023 4.48 (H)  0.30 - 4.20 uIU/mL Final    Testosterone Total 08/11/2023 535  240 - 950 ng/dL Final    Lipase 08/11/2023 35  13 - 60 U/L Final    Cholesterol 08/11/2023 263 (H)  <200 mg/dL Final    Triglycerides 08/11/2023 359 (H)  <150 mg/dL Final    Direct Measure HDL 08/11/2023 48  >=40 mg/dL Final    LDL Cholesterol Calculated 08/11/2023 143 (H)  <=100 mg/dL Final    Non HDL Cholesterol 08/11/2023 215 (H)  <130 mg/dL Final    Treponema Antibody Total 08/11/2023 Reactive (A)  Nonreactive Final    The Anti-Treponema Antibody screening tends to remain positive for life, therefore it does not distinguish between active and past syphilis infections. All positive and equivocal results will automatically reflex to a non-specific Rapid Plasma Reagin (RPR) test.    If the Treponema Antibody is positive, and the RPR is positive, this is presumptive evidence of current infection, inadequately treated infection, persistent infection or reinfection.    If the Anti-Treponema Antibody is positive and the RPR is negative, then a second Treponema specific test (TP-PA) will be performed to determine whether the antibody test is falsely positive or is detecting early infection.  If the latter is suspected, repeat testing in approximately two weeks is recommended.    WBC Count 08/11/2023 10.4  4.0 - 11.0 10e3/uL Final    RBC Count 08/11/2023 4.67  4.40 - 5.90 10e6/uL Final    Hemoglobin 08/11/2023 14.4  13.3 - 17.7 g/dL Final    Hematocrit 08/11/2023 40.7  40.0 - 53.0 % Final    MCV 08/11/2023 87  78 - 100 fL Final    MCH 08/11/2023 30.8  26.5 - 33.0 pg Final    MCHC 08/11/2023 35.4   31.5 - 36.5 g/dL Final    RDW 08/11/2023 13.3  10.0 - 15.0 % Final    Platelet Count 08/11/2023 416  150 - 450 10e3/uL Final    % Neutrophils 08/11/2023 59  % Final    % Lymphocytes 08/11/2023 30  % Final    % Monocytes 08/11/2023 6  % Final    % Eosinophils 08/11/2023 3  % Final    % Basophils 08/11/2023 1  % Final    % Immature Granulocytes 08/11/2023 1  % Final    NRBCs per 100 WBC 08/11/2023 0  <1 /100 Final    Absolute Neutrophils 08/11/2023 6.2  1.6 - 8.3 10e3/uL Final    Absolute Lymphocytes 08/11/2023 3.2  0.8 - 5.3 10e3/uL Final    Absolute Monocytes 08/11/2023 0.6  0.0 - 1.3 10e3/uL Final    Absolute Eosinophils 08/11/2023 0.3  0.0 - 0.7 10e3/uL Final    Absolute Basophils 08/11/2023 0.1  0.0 - 0.2 10e3/uL Final    Absolute Immature Granulocytes 08/11/2023 0.1  <=0.4 10e3/uL Final    Absolute NRBCs 08/11/2023 0.0  10e3/uL Final    Rapid Plasma Reagin 08/11/2023 Reactive (A)  Nonreactive Final    Rapid Plasma Reagin Titer 08/11/2023 1:1  Non Reactive Final     ASSESSMENT/PLAN:    Stable, well-controlled HIV infection:  Mr. Bee takes Biktarvy (switched in 6/20 from long-standing Combivir / nevirapine) with tight dosing adherence and good drug tolerance.  His 8/11/23 monitoring labs show a persistently normal absolute CD4+ cell count and an undetectable HIV plasma viral load.  He will remain on Biktarvy and return to [facility] for follow-up in six months (with HIV and other monitoring laboratory studies before that visit, ordered today), or as needed before then.  Persistent mixed hyperlipidemia:  He resumed fenofibrate 108 mg (two 54 mg tablets) PO daily at his most recent past 2/15/23 appointment here and also has been on pravastatin increased from 10 mg to 20 mg daily at that appointment, but still has a quite elevated fasting total cholesterol (263), LDL cholesterol (143) and triglycerides (359) although the triglycerides are somewhat improved.  He has tolerated both drugs well so far.   He will remain on the fenofibrate and increase the pravastatin dose to 40 mg PO daily (with a repeat lipid panel in one to two months from now).  Hypothyroidism on levothyroxine:  In 2020 - 21, his TSH level was below the lower limit of assay normal on long-standing 200 mcg daily, so the levothyroxine dose was decreased from 200 mcg to 100 mcg daily in 8/22.  At that dose, he feels quite fatigued (although whether the fatigue is thyroid-related is uncertain) and the most recent 8/11/23 TSH was mildly high at 4.48.  He has also gained a little weight in the past year (although is not exercising to any degree).  We will raise his levothyroxine dose up to 150 mcg daily today and recheck a TSH in about 1 - 1.5 months.  If that is not lower and his fatigue is not better, will then consider a further rise back up to 200 mcg daily.  Obstructive sleep apnea s/p placement of Inspire device:  Having not tolerated a number fo other therapy attempts (including CPap), he had  an Inspire device implanted by a physician at [facility] in [place] on 2/23/23.  He does not feel more energetic, but a follow-up sleep study at [facility] showed a marked decrease in the number of sleep interruptions per hour (from 18 pre- to 1.7 post-Inspire).  Random intermittent episodes of focal pruritus:  The etiology is unclear.  The pattern of migratory locations is not suggestive of a contact etiology and he can not think of a potential substance of exposure anyway.  The sites do not correspond to his known mild psoriasis -- the pruritic locations do not have visible rash.  His syphilis screen on 8/11/23 is negative (with a stable RPR titer) and this pattern of pruritus does not seem consistent with hypothyroidism.  He will try more consistent (daily) moisturizing (with cream or baby oil) to determine if that prevents these attacks of pruritus.  Elevated creatinine:  The 8/11/23 random creatinine was 1.33, increased from 1.20 on 2/10/23.  His BUN  suggests he may have been a bit dry that day.  Will recheck the creatinine with in 1 - 1.5 months.  Chronic, intractable depression / anxiety / insomnia / agoraphobia -- presently apparently worse:  Managed by Dr. Johnston, his psychiatrist in the [facility] in [place], who he sees often and who discontinued his lamotrigine medication.  Will defer to Dr. Johnston.    As usual, I encouraged Mr. Bee to try to exercise or walk daily.  Nocturia / presumed benign prostatism / history of Peyronie's disease:  He has had  symptoms consistent with BPH and had palpable prostatic enlargement on 10/18/17 digital rectal exam with a normal PSA screen at that time, so has now been on Flomax 0.8 mg PO q evening (since 6/22 through Urology Clinic) with, apparently some improvement, since the nocturia and daytime frequency were again not complained of today.  He was seen by Dr. Irwin in Urology clinic on 6/3/22 and underwent cystoscopy which was unremarkable.  Well-controlled essential hypertension:  On ongoing amlodipine 10 mg PO daily (increased 5/17/17, started 5/20/15) and enalapril 20 mg PO BID (started 11/29/11 and dose increased 1/27/15) his clinic check-in blood pressures remain consistently good.  The current prescriptions were renewed today.  Testosterone replacement therapy / previous erectile dysfunction:  His most recent total testosterone level on 8/12/22 remains normal at 452 on ongoing Axiron transcutaneous testosterone topical replacement therapy, initially at two (30 mg) pumps (= 60 mg) daily (since late 1/15), but now at one pump daily since mid-2019.  The 8/11/23 total testosterone level was good at 535.  Will continue the present dose -- prescription renewed today.  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  This was another reason he switched off zidovudine in summer 2020, although there  has not been any evident progression of his lipodystrophy for a number of years.  History of prior, mild, mid-winter vitamin D deficiency:  He self-takes OTC vitamin D supplements during some white but has not in the past year, although whether he even needs that is questionable, because his 10/12/18 vitamin D level was adequate at 20.  Mild psoriasis, presently in remission:  Well-controlled with topical Clobex prn.  Not discussed today.  Health Care Maintenance:  He has received five Covid-19 (Moderna) vaccinations on 4/11/21, 5/9/21, 11/28/21, 4/5/22, and (bivalent) 10/22/22 -- encouraged to obtain another booster this October.  Jynneos Mpox vaccines were given on 8/17/22 and 9/21/22.  Influenza vaccine received 10/10/22 -- will up date again this autumn.  Menactra vaccine given 11/18/15.  Tdap was boosted 1/21/15.  Prevnar 13 given 10/16/13; Pneumovax given 1/21/15.  Shingrix vaccinations were given on 8/17/22 and 10/12/22.  HAV / HBV immunized / seroimmune.  HCV negative 6/1/09.  Negative rectal Pap done by Dr. Sanchez 6/28/10.  Antitreponemal antibody positive since 8/27/09 but repeat RPR was again (nearly) negative on 8/11/23 with a titer of 1:1 (one dilution off from non-reactive, which is within assay variability) -- will monitor.  Quantiferon Gold was negative 11/12/15.  A screening hemoglobin A1C (because he has had several slightly elevated random glucoses over the past several years) was normal at 5.6% on 6/14/19, but will repeat that again with his next blood draw  Sees a dentist annually.  Sees a sleep apnea specialist for Cpap.  Saw an ophthalmologist in early 2012.  Had a  non-[facility] dermatologist appointment for a complete skin check in early 2019 and was told he had only chronic lentiga without any concerning lesions.  Underwent a routine screening colonoscopy at [facility] in [place], [place], in 1/19 which was normal (without polyps, per the patient) except for diverticulosis  --  encouraged to intake more fiber.

## 2023-08-17 NOTE — TELEPHONE ENCOUNTER
EP called 8/17 to sched a 6 month follow up with Dr. Latham per checkout notes from 8/16. The appt is set for 2/21/24 via in-person.

## 2023-10-19 ENCOUNTER — LAB (OUTPATIENT)
Dept: LAB | Facility: CLINIC | Age: 58
End: 2023-10-19
Payer: COMMERCIAL

## 2023-10-19 DIAGNOSIS — E78.2 MIXED HYPERLIPIDEMIA: ICD-10-CM

## 2023-10-19 DIAGNOSIS — E03.9 ACQUIRED HYPOTHYROIDISM: ICD-10-CM

## 2023-10-19 DIAGNOSIS — R79.89 ELEVATED SERUM CREATININE: ICD-10-CM

## 2023-10-19 DIAGNOSIS — R73.9 ELEVATED RANDOM BLOOD GLUCOSE LEVEL: ICD-10-CM

## 2023-10-19 LAB
CHOLEST SERPL-MCNC: 173 MG/DL
CREAT SERPL-MCNC: 1.13 MG/DL (ref 0.67–1.17)
EGFRCR SERPLBLD CKD-EPI 2021: 76 ML/MIN/1.73M2
HBA1C MFR BLD: 6.2 %
HDLC SERPL-MCNC: 43 MG/DL
LDLC SERPL CALC-MCNC: 80 MG/DL
NONHDLC SERPL-MCNC: 130 MG/DL
TRIGL SERPL-MCNC: 251 MG/DL
TSH SERPL DL<=0.005 MIU/L-ACNC: 2.86 UIU/ML (ref 0.3–4.2)

## 2023-10-19 PROCEDURE — 99000 SPECIMEN HANDLING OFFICE-LAB: CPT | Performed by: PATHOLOGY

## 2023-10-19 PROCEDURE — 84443 ASSAY THYROID STIM HORMONE: CPT | Performed by: PATHOLOGY

## 2023-10-19 PROCEDURE — 36415 COLL VENOUS BLD VENIPUNCTURE: CPT | Performed by: PATHOLOGY

## 2023-10-19 PROCEDURE — 80061 LIPID PANEL: CPT | Performed by: PATHOLOGY

## 2023-10-19 PROCEDURE — 82565 ASSAY OF CREATININE: CPT | Performed by: PATHOLOGY

## 2023-10-19 PROCEDURE — 83036 HEMOGLOBIN GLYCOSYLATED A1C: CPT | Performed by: INTERNAL MEDICINE

## 2023-11-01 ENCOUNTER — TELEPHONE (OUTPATIENT)
Dept: INFECTIOUS DISEASES | Facility: CLINIC | Age: 58
End: 2023-11-01
Payer: COMMERCIAL

## 2023-11-01 NOTE — LETTER
"    November 1, 2023       TO: Dennys Bee  [address] [facility] [address]       Dear ,    We are writing to inform you of your test results from 10/19/23.     TSH 10/19/2023 2.86  0.30 - 4.20 uIU/mL Final    Cholesterol 10/19/2023 173  <200 mg/dL Final    Triglycerides 10/19/2023 251 (H)  <150 mg/dL Final    Direct Measure HDL 10/19/2023 43  >=40 mg/dL Final    LDL Cholesterol Calculated 10/19/2023 80  <=100 mg/dL Final    Non HDL Cholesterol 10/19/2023 130 (H)  <130 mg/dL Final    Creatinine 10/19/2023 1.13  0.67 - 1.17 mg/dL Final    GFR Estimate 10/19/2023 76  >60 mL/min/1.73m2 Final    Hemoglobin A1C 10/19/2023 6.2 (H)  <5.7 % Final    Normal <5.7%   Prediabetes 5.7-6.4%    Diabetes 6.5% or higher     Note: Adopted from ADA consensus guidelines.     - Your thyroid test (TSH) is now normal and appropriate on the resumed higher dose of levothyroxine at 150 mcg/day.  - Your previously (in 8/2023) elevated creatinine (blood kidney test) is now back to normal -- no further evaluation is needed.  - Your total cholesterol and LDL (\"bad\") cholesterol (at 80) are much improved on the increased dose of pravastatin -- I would recommend continuing that unless you have any concerns about it.  - Your hemoglobin A1C (\"long-term blood sugar test\") has increased a bit further into the prediabetes range at 6.2%.  The recommended treatment for pre-diabetes is frequent aerobic exercise (such as walking) and diet refinement.  Please let me know if you wish to have a referral to a diabetic educator or nutritionist.  (I think they probably have Virtual Video appointments available.)  - With the prediabetes, if you have not had a routine retinal eye exam within the past two or three years, so should probably make an appointment for that within the next several months.    It was a pleasure to see you at your appointment in August.  Please phone me if you have any questions or concerns.    Sincerely,    Rc" MD Noa  [phone]

## 2023-11-01 NOTE — TELEPHONE ENCOUNTER
"[facility] MD Telephone Note:    I phoned Mr. Bee this PM to give him feedback regarding his 10/19/23 laboratory tests (because, if I recall correctly, he does not use MyChart) but the call was not answered, so I instead left a long voicemail message with the statement he should phone back if any questions or additional concerns.    I said:  - His TSH is now normal and appropriate on the resumed higher dose of levothyroxine at 150 mcg/day.  - His previously (in 8/2023) elevated creatinine was now back to normal -- no further evaluation is needed.  - His total and LDL (80) cholesterol are much improved on the increased dose of pravastatin increased on 8/16/23 -- unless he has noticed any side effects, that dose should continue.  - His hemoglobin A1C (\"long-term blood sugar test\") has increased a bit further into the prediabetes range at 6.2%.  The appropriate treatment is frequent aerobic exercise and diet adjustment.  He should contact our clinic if he wishes a referral to a diabetic educator or nutritionist.  - He should also schedule a routine retinal exam if he has not had one in the past two or three years.    I will also mail him a letter with these points.  "

## 2023-11-04 ENCOUNTER — HEALTH MAINTENANCE LETTER (OUTPATIENT)
Age: 58
End: 2023-11-04

## 2023-11-28 DIAGNOSIS — H43.393 VITREOUS SYNERESIS OF BOTH EYES: Primary | ICD-10-CM

## 2023-11-29 ENCOUNTER — MEDICAL CORRESPONDENCE (OUTPATIENT)
Dept: HEALTH INFORMATION MANAGEMENT | Facility: CLINIC | Age: 58
End: 2023-11-29
Payer: COMMERCIAL

## 2023-12-06 ENCOUNTER — OFFICE VISIT (OUTPATIENT)
Dept: OPHTHALMOLOGY | Facility: CLINIC | Age: 58
End: 2023-12-06
Attending: OPHTHALMOLOGY
Payer: COMMERCIAL

## 2023-12-06 DIAGNOSIS — E11.9 DM TYPE 2 WITHOUT RETINOPATHY (H): Primary | ICD-10-CM

## 2023-12-06 DIAGNOSIS — H25.13 NUCLEAR SENILE CATARACT OF BOTH EYES: ICD-10-CM

## 2023-12-06 DIAGNOSIS — H43.393 VITREOUS SYNERESIS OF BOTH EYES: ICD-10-CM

## 2023-12-06 DIAGNOSIS — H04.123 DRY EYE SYNDROME OF BOTH EYES: ICD-10-CM

## 2023-12-06 PROCEDURE — 99207 FUNDUS PHOTOS OU (BOTH EYES): CPT | Mod: 26 | Performed by: OPHTHALMOLOGY

## 2023-12-06 PROCEDURE — G0463 HOSPITAL OUTPT CLINIC VISIT: HCPCS | Performed by: OPHTHALMOLOGY

## 2023-12-06 PROCEDURE — 92004 COMPRE OPH EXAM NEW PT 1/>: CPT | Performed by: OPHTHALMOLOGY

## 2023-12-06 PROCEDURE — 92250 FUNDUS PHOTOGRAPHY W/I&R: CPT | Performed by: OPHTHALMOLOGY

## 2023-12-06 PROCEDURE — 92134 CPTRZ OPH DX IMG PST SGM RTA: CPT | Performed by: OPHTHALMOLOGY

## 2023-12-06 ASSESSMENT — CONF VISUAL FIELD
METHOD: COUNTING FINGERS
OS_INFERIOR_TEMPORAL_RESTRICTION: 0
OD_SUPERIOR_TEMPORAL_RESTRICTION: 0
OS_NORMAL: 1
OS_INFERIOR_NASAL_RESTRICTION: 0
OS_SUPERIOR_NASAL_RESTRICTION: 0
OS_SUPERIOR_TEMPORAL_RESTRICTION: 0
OD_INFERIOR_TEMPORAL_RESTRICTION: 0
OD_INFERIOR_NASAL_RESTRICTION: 0
OD_NORMAL: 1
OD_SUPERIOR_NASAL_RESTRICTION: 0

## 2023-12-06 ASSESSMENT — EXTERNAL EXAM - LEFT EYE: OS_EXAM: NORMAL

## 2023-12-06 ASSESSMENT — SLIT LAMP EXAM - LIDS
COMMENTS: NORMAL
COMMENTS: NORMAL

## 2023-12-06 ASSESSMENT — VISUAL ACUITY
METHOD: SNELLEN - LINEAR
OD_SC: 20/25
OS_SC: 20/25
OD_SC+: -2
OS_SC+: -2

## 2023-12-06 ASSESSMENT — TONOMETRY
IOP_METHOD: TONOPEN
OD_IOP_MMHG: 18
OS_IOP_MMHG: 20

## 2023-12-06 ASSESSMENT — EXTERNAL EXAM - RIGHT EYE: OD_EXAM: NORMAL

## 2023-12-06 NOTE — PROGRESS NOTES
CC -  retinal evaluation    INTERVAL HISTORY - Initial visit    HPI -   Dennys Bee is a  58 year old year-old patient presenting for retinal examination. He is being followed for pre diabetes. No vision complaints other than needing readers       FH of DM I   Mother's brother has RP (is not sure what type)        ASSESSMENT & PLAN    1. DM type 2 without retinopathy (H)    2. Vitreous syneresis of both eyes    3. Dry eye syndrome of both eyes    4. Nuclear senile cataract of both eyes      discussed the importance of good BP/BS/Chol control  AT PRN  Observe for cataract progression        return to clinic: q2yrs    Complete documentation of historical and exam elements from today's encounter can be found in the full encounter summary report (not reduplicated in this progress note). I personally obtained the chief complaint(s) and history of present illness.  I confirmed and edited as necessary the review of systems, past medical/surgical history, family history, social history, and examination findings as documented by others; and I examined the patient myself. I personally reviewed the relevant tests, images, and reports as documented above. I formulated and edited as necessary the assessment and plan and discussed the findings and management plan with the patient and family.     Jonnathan Heller MD, PhD

## 2023-12-06 NOTE — NURSING NOTE
Chief Complaints and History of Present Illnesses   Patient presents with    Retinal Evaluation     Chief Complaint(s) and History of Present Illness(es)       Retinal Evaluation               Comments    Pt states that he is having some difficulty focusing up close at times. No eye pain today.  No new flashes. Occasional floaters, pt uncertain which eye.   No redness or dryness.  Pt is prediabetic, does not check blood sugars.  Lab Results       Component                Value               Date                       A1C                      6.2                 10/19/2023                 A1C                      5.7                 08/12/2022                 A1C                      5.6                 06/14/2019                 A1C                      4.7                 10/20/2005                 A1C                      5.3                 02/22/2005                 A1C                      5.2                 02/10/2005                 A1C                      8.3                 01/26/2005              NAVEED Lindsey December 6, 2023 1:11 PM

## 2024-02-19 DIAGNOSIS — E34.9 HYPOTESTOSTERONISM: ICD-10-CM

## 2024-02-19 DIAGNOSIS — E03.9 ACQUIRED HYPOTHYROIDISM: Primary | ICD-10-CM

## 2024-02-19 DIAGNOSIS — Z21 HUMAN IMMUNODEFICIENCY VIRUS I INFECTION (H): ICD-10-CM

## 2024-02-19 RX ORDER — BICTEGRAVIR SODIUM, EMTRICITABINE, AND TENOFOVIR ALAFENAMIDE FUMARATE 50; 200; 25 MG/1; MG/1; MG/1
1 TABLET ORAL DAILY
Qty: 90 TABLET | Refills: 3 | Status: SHIPPED | OUTPATIENT
Start: 2024-02-19

## 2024-02-21 ENCOUNTER — OFFICE VISIT (OUTPATIENT)
Dept: INFECTIOUS DISEASES | Facility: CLINIC | Age: 59
End: 2024-02-21
Attending: INTERNAL MEDICINE
Payer: COMMERCIAL

## 2024-02-21 VITALS
TEMPERATURE: 98 F | DIASTOLIC BLOOD PRESSURE: 74 MMHG | BODY MASS INDEX: 25.03 KG/M2 | HEIGHT: 69 IN | HEART RATE: 99 BPM | SYSTOLIC BLOOD PRESSURE: 133 MMHG | WEIGHT: 169 LBS

## 2024-02-21 DIAGNOSIS — Z21 HUMAN IMMUNODEFICIENCY VIRUS I INFECTION (H): Primary | ICD-10-CM

## 2024-02-21 DIAGNOSIS — E03.9 HYPOTHYROIDISM, UNSPECIFIED TYPE: ICD-10-CM

## 2024-02-21 DIAGNOSIS — R73.03 PRE-DIABETES: ICD-10-CM

## 2024-02-21 DIAGNOSIS — I10 ESSENTIAL HYPERTENSION: ICD-10-CM

## 2024-02-21 DIAGNOSIS — E78.2 MIXED HYPERLIPIDEMIA: ICD-10-CM

## 2024-02-21 PROCEDURE — G0463 HOSPITAL OUTPT CLINIC VISIT: HCPCS | Performed by: INTERNAL MEDICINE

## 2024-02-21 PROCEDURE — 99214 OFFICE O/P EST MOD 30 MIN: CPT | Performed by: INTERNAL MEDICINE

## 2024-02-21 RX ORDER — LAMOTRIGINE 100 MG/1
TABLET ORAL
COMMUNITY
Start: 2024-02-19

## 2024-02-21 ASSESSMENT — PAIN SCALES - GENERAL: PAINLEVEL: NO PAIN (0)

## 2024-02-21 NOTE — Clinical Note
2/21/2024       RE: Dennys Bee  [address] [facility] [address]     Dear Colleague,    Thank you for referring your patient, Dennys Bee, to the [facility] INFECTIOUS DISEASE CLINIC [place] at [facility]. Please see a copy of my visit note below.      Division of Infectious Diseases and International Medicine  Department of Medicine        [facility] OUTPATIENT VISIT NOTE [place] Mail Code [phone] [place] [address]  Office: [phone]  Fax:  [phone]     HISTORY OF PRESENT ILLNESS:    This 58 year old gentleman with asymptomatic HIV infection returns today to clinic for semi-annual follow-up of his antiretroviral therapy comprised of Biktarvy one tablet PO daily (switched to simplify his dosing regimen in 8/20 from Combivir one tablet PO BID plus nevirapine 200 mg PO BID which were started in 5/03), as well as multiple other health issues.  He has not missed a Biktarvy dose in a long while and reports no side effects.  His most recent last  HIV drug toxicity laboratory studies were drawn 10/19/23 and looked good, except for a persistently elevated hemoglobin A1C in the pre-diabetes range at 6.2%.  He has not been exercising or watching his diet much in recent months, but he acquired a treadmill a few weeks ago which he said he has started to use in the past week.  He hopes to use it five days per week.  He is willling to repeat the hemoglobin A1C and HIV labs in a couple of months.  A 10/19/23 TSH was normal (2.86) after he has been back on the 150 mcg dose of levothyroxine for the past six months.  He has had some increased post-prandial eructation, some sensation of bloating, and occasional quite loose stools in the past couple of months and is wondering if he might have some early lactose intolerance.  His most recent past colonoscopy was at [facility] in [place], [place], in  "1/2019 and he is wondering if that is due for a repeat now, but I do not have the procedure report (with the recommendation) available via [employer] so can not say.  He has been working (and checking in monthly) with his [employer]  and is considering getting a port-time job.  His bilateral elbow psoriasis has been fairly minimal and well-controlled in the past year or so without much us of topical medication.  He continues to wake multiple times per night despite the implantation of the Inspire implant at [facility] on 2/23/23 for obstructive sleep apnea, but a subsequent sleep study showed fewer wakings than pre-implant and the settings on the device are still being \"tweaked\".  With Flomax 0.8 mg daily for BPH he is still having nocturia, but it is stable.  On an increased dose (since 8/2023) of pravastatin 40 mg daily his LDL cholesterol improved in 10/2023 to 80, total cholesterol to 173, and triglycerides down to 251.  He has not noticed ill effects from that increased statin dose.  His chronic depression sounds to be roughly stable and he is looking forward to days of longer daylight.  He continues to be followed by his psychiatrist, Dr. Johnston in [place],  He remains on amlodipine and enalapril for hypertension with ongoing good blood pressure control (including a clinic check-in blood pressure today.  He continues to apply Axiron topically for hypotestosteronism (with an 8/11/23 total testosterone level of 535).  Beyond these issues, he says his health is stable lately without additional concerns or complaint today, including no recent febrile or EENT symptoms, cough, dyspnea, chest pain, GI or  symptoms, visible rash, or neurological symptoms.    PAST MEDICAL HISTORY:    HIV, diagnosed 7/93, previously treated with AZT, 3TC and Crixivan. He was then on AZT plus efavirenz, but in 5/03 began Combivir/Sustiva and has remained virologically suppressed on this " regimen since.  Stable chronic stage 1 kidney disease:  Nephrology Clinic evaluation (including negative MRI scan) unremarkable in 2009.  Hyperlipidemia previously requiring multiple medications, more hypertriglyceridemia than hypercholesterolemia -- now on fenofibrate plus Pravachol which was added 4/18/18.   Hyperthyroidism on levothyroxine.   History of difficult-to-treat anxiety and depression -- followed for a number of years by psychiatrist Dr. Abebe Johnston in [place].   Hypertension treated with Vasotec.   Acute appendicitis with rupture in the summer of 2007.  Cholecystectomy in the summer of 2007.   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  Followed there annually.   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  Previously received Sculptra.   History of a low testosterone level, most recently checked in 1/09 with a normal free testosterone of 14.0 and a total testosterone in the normal range of 482, managed somewhat unorthodoxly by a now-retired Urologist until 7/13.  Since then, has continued taking biweekly home exogenous testosterone IM injections and has tapered them only marginally from ~ weekly to ~ every other week (or a bit less frequent).  Total testosterone level on 5/14/14 was 2926.  Syphilis diagnosed 5/08, peak titer of 1:256, completed a treatment course of IM penicillin times 3, last dose in 6/08.  Prior rectal warts, treated with Aldara cream.  Infected lower left cracked mandibular molar tooth, repaired 6/10.  Unintentional overdose of alcohol, hydrocodone, and clonazepam for which hospitalized in [place] in 10/12.  Mild intermittent controlled extensor limb psoriasis.  Sleep apnea, diagnosed elsewhere.  Inspire implant inserted on 2/23/23.    Past Surgical History:   Procedure Laterality Date      APPENDECTOMY OPEN  Summer 2007.    For acute appendicitis with rupture.     CHOLECYSTECTOMY  2007.     ALLERGIES:  Penicillin and iodine.    CURRENT MEDICATIONS:  Current Outpatient Medications   Medication Sig Dispense Refill     amLODIPine (NORVASC) 10 MG tablet Take 1 tablet (10 mg) by mouth daily 90 tablet 3     BIKTARVY -25 MG per tablet TAKE ONE TABLET BY MOUTH ONCE DAILY 90 tablet 3     buPROPion (WELLBUTRIN XL) 150 MG 24 hr tablet 150 mg daily Patient takes 3 tabs daily for total 450mg       enalapril (VASOTEC) 20 MG tablet Take 1 tablet (20 mg) by mouth 2 times daily 180 tablet 1     fenofibrate (LOFIBRA) 54 MG tablet Take 2 tablets (108 mg) by mouth daily 180 tablet 3     hydrOXYzine (ATARAX) 25 MG tablet        lamoTRIgine (LAMICTAL) 100 MG tablet        levothyroxine (SYNTHROID/LEVOTHROID) 150 MCG tablet Take 1 tablet (150 mcg) by mouth daily 90 tablet 3     multivitamin w/minerals (THERA-VIT-M) tablet Take 1 tablet by mouth daily       pravastatin (PRAVACHOL) 40 MG tablet Take 1 tablet (40 mg) by mouth daily 90 tablet 3     tadalafil (CIALIS) 20 MG tablet Take 1 tablet (20 mg) by mouth daily as needed (take 2 hours before intercourse) 30 tablet 11     tamsulosin (FLOMAX) 0.4 MG capsule Take 2 capsules (0.8 mg) by mouth every evening 180 capsule 3     testosterone (AXIRON) 30 MG/ACT topical solution Place 1 pump (30 mg) onto the skin daily 270 mL 5     traZODone (DESYREL) 50 MG tablet        FAMILY HISTORY:  Family History   Problem Relation Age of Onset     Depression Father      Hyperlipidemia Father      Heart Disease Maternal Grandmother      Myocardial Infarction Maternal Grandfather         Sudden MI / death at age 51.     Hyperlipidemia Maternal Grandfather      Obesity Paternal Grandmother      Depression Other         Multiple family members.     Hyperlipidemia Other         Multiple family members (Parents, brother, sister, PGM)     Retinitis pigmentosa Maternal Uncle      Glaucoma No  family hx of      Macular Degeneration No family hx of      SOCIAL HISTORY:  Lives alone in basement apartment in [place]ut spends much of his time at his parents home in [place] in the town he grew up in and where all his siblings still live.  Unemployed, on disability based on his depression diagnosis.  Sexually active, no single steady partner, mostly with other HIV seropositive men, but always discloses his status when with HIV seronegative partners and uses protection.  In the past, has visited friends in [place] for an extended stay.  Tobacco:  Long-standing, up to 2.5 ppd in the past.  Presently 1 ppd (but smokes only half of each cigarette).  Has quit for up to three weeks several times in the past; not interested in further attempts at reduction at present.  Has a treadmill in the basement of his parents home that he can use for indoor exercise during cold weather, although he does so infrequently.  His father was diagnosed with cancer in winter 2019-20.    REVIEW OF SYSTEMS:  As per HPI.    Answers submitted by the patient for this visit:  Symptoms you have experienced in the last 30 days (Submitted on 8/10/2023)  General Symptoms: Yes  Skin Symptoms: Yes  HENT Symptoms: Yes  EYE SYMPTOMS: No  HEART SYMPTOMS: No  LUNG SYMPTOMS: Yes  INTESTINAL SYMPTOMS: Yes  URINARY SYMPTOMS: Yes  REPRODUCTIVE SYMPTOMS: No  SKELETAL SYMPTOMS: No  BLOOD SYMPTOMS: No  NERVOUS SYSTEM SYMPTOMS: No  MENTAL HEALTH SYMPTOMS: Yes  Please answer the questions below to tell us what conditions you are experiencing: (Submitted on 8/10/2023)  Ear pain: No  Ear discharge: No  Hearing loss: No  Tinnitus: No  Nosebleeds: No  Congestion: No  Sinus pain: No  Trouble swallowing: No   Voice hoarseness: No  Mouth sores: No  Sore throat: No  Tooth pain: Yes  Gum tenderness: No  Bleeding gums: No  Change in taste: No  Change in sense of smell: No  Dry mouth: No  Hearing aid used: No  Neck lump: No  Please answer the questions  below to tell us what conditions you are experiencing: (Submitted on 8/10/2023)  Fever: No  Loss of appetite: No  Weight gain: Yes  Fatigue: Yes  Night sweats: No  Chills: No  Increased stress: Yes  Excessive hunger: No  Excessive thirst: No  Feeling hot or cold when others believe the temperature is normal: No  Loss of height: No  Post-operative complications: No  Surgical site pain: No  Hallucinations: No  Change in or Loss of Energy: Yes  Hyperactivity: No  Confusion: No   (Submitted on 8/10/2023)  Changes in hair: No  Changes in moles/birth marks: No  Itching: Yes  Rashes: No  Changes in nails: No  Acne: No  Change in facial hair: No  Warts: No  Non-healing sores: No  Scarring: No  Flaking of skin: No  Color changes of hands/feet in cold : No  Sun sensitivity: No  Skin thickening: No  Please answer the questions below to tell us what condition you are experiencing: (Submitted on 8/10/2023)  Cough: Yes  Sputum or phlegm: No  Coughing up blood: No  Difficulty breating or shortness of breath: No  Snoring: Yes  Wheezing: No  Difficulty breathing on exertion: No  Nighttime Cough: Yes  Difficulty breathing when lying flat: No  Please answer the questions below to tell us what conditions you are experiencing: (Submitted on 8/10/2023)  Heart burn or indigestion: No  Nausea: No  Vomiting: No  Abdominal pain: No  Bloating: No  Constipation: No  Diarrhea: Yes  Blood in stool: No  Black stools: No  Rectal or Anal pain: No  Fecal incontinence: No  Yellowing of skin or eyes: No  Vomit with blood: No  Change in stools: No  Please answer the questions below to tell us what condition you are experiencing: (Submitted on 8/10/2023)  Trouble holding urine or incontinence: No  Pain or burning: No  Trouble starting or stopping: No  Increased frequency of urination: Yes  Blood in urine: No  Decreased frequency of urination: No  Frequent nighttime urination: Yes  Flank pain: No  Difficulty emptying bladder: Yes  Please answer the  "questions below to tell us what condition you are experiencing: (Submitted on 8/10/2023)  Nervous or Anxious: Yes  Depression: Yes  Trouble sleeping: Yes  Trouble thinking or concentrating: Yes  Mood changes: Yes  Panic attacks: Yes    PHYSICAL EXAMINATION:  General:  A pleasant, conversant, WDWN 58-year-old man in no discomfort.  Vital Signs:  /74   Pulse 99   Temp 98  F (36.7  C) (Oral)   Ht 1.753 m (5' 9\")   Wt 76.7 kg (169 lb)   BMI 24.96 kg/m   (weight is stable).  Skin:  No new rash or lesion.  There are two small (~ 3 cm diameter) patches of bilateral posterior elbow psoriasis without other lesions elsewhere.  Scattered chronic keratoses are stable. Sclerae are white.  Conjunctivae are pink bilaterally.  Oropharynx has no erythema, exudate or lesion.  Neck is supple without lymphadenopathy or thyromegaly.  Lungs:  Bilaterally clear to auscultation.  CV:  RRR, normal S1, S2 without gallop, murmur or rub.  Abdomen:  Bowel sounds normal, soft, nontender, no hepatosplenomegaly, old surgical scar unchanged.  Back:  No lower spine or CVA tenderness.  Extremities:  Distally warm, no edema.  Neurological:  Alert, oriented, memory / cognition / affect intact.  Gait is normal.    LABORATORY STUDIES (Reviewed with the patient during his appointment today):    T Cell Subset:  Absolute CD4   Date Value Ref Range Status   12/11/2020 1,087 441 - 2,156 cells/uL Final     Absolute CD4, [place] T Cells   Date Value Ref Range Status   08/11/2023 1,156 441 - 2,156 cells/uL Final     CD4 [place] T   Date Value Ref Range Status   12/11/2020 35 28 - 63 % Final     CD4% [place] T Cells   Date Value Ref Range Status   08/11/2023 35 28 - 63 % Final     CD8 Suppressor T   Date Value Ref Range Status   12/11/2020 58 (H) 10 - 40 % Final     CD8% Suppressor T Cells   Date Value Ref Range Status   08/11/2023 58 (H) 10 - 40 % Final     CD3 Mature T   Date Value Ref Range Status   12/11/2020 89 (H) 49 - 84 % Final     CD3% Total T " Cells   Date Value Ref Range Status   08/11/2023 88 (H) 49 - 84 % Final     CD4:CD8 Ratio   Date Value Ref Range Status   08/11/2023 0.60 (L) 1.40 - 2.60 Final   12/11/2020 0.60 (L) 1.40 - 2.60 Final     WBC   Date Value Ref Range Status   12/11/2020 8.2 4.0 - 11.0 10e9/L Final     WBC Count   Date Value Ref Range Status   08/11/2023 10.4 4.0 - 11.0 10e3/uL Final     % Lymphocytes   Date Value Ref Range Status   08/11/2023 30 % Final   12/11/2020 32.9 % Final     Absolute CD3   Date Value Ref Range Status   12/11/2020 2,747 603 - 2,990 cells/uL Final     Absolute CD3, Total T Cells   Date Value Ref Range Status   08/11/2023 2,934 603 - 2,990 cells/uL Final     Absolute CD8   Date Value Ref Range Status   12/11/2020 1,786 (H) 125 - 1,312 cells/uL Final     Absolute CD8, Suppressor T Cells   Date Value Ref Range Status   08/11/2023 1,919 (H) 125 - 1,312 cells/uL Final     HIV-1 RNA Quantitative:  HIV-1 RNA Quant Result   Date Value Ref Range Status   12/11/2020 HIV-1 RNA Not Detected HIVND^HIV-1 RNA Not Detected [Copies]/mL Final     Comment:     The BEV AmpliPrep/BEV TaqMan HIV-1 test is an FDA-approved in vitro   nucleic acid amplification test for the quantitation of HIV-1 RNA in human   plasma (EDTA plasma) using the BEV AmpliPrep instrument for automated viral   nucleic acid extraction and the BEV TaqMan Analyzer or BEV TaqMan for   automated Real Time PCR amplification and detection of the viral nucleic acid   target.  Titer results are reported in copies/ml. This assay is intended for use in   conjunction with clinical presentation and other laboratory markers of disease   prognosis and for use as an aid in assessing viral response to antiretroviral   treatment as measured by changes in plasma HIV-1 RNA levels. This test should   not be used as a donor screening test to confirm the presence of HIV-1   infection.       HIV-1 RNA copies/mL   Date Value Ref Range Status   08/11/2023 Not Detected Not  Detected Copies/mL Final   02/10/2023 Not Detected Not Detected Copies/mL Final       TSH 10/19/2023 2.86  0.30 - 4.20 uIU/mL Final     Cholesterol 10/19/2023 173  <200 mg/dL Final     Triglycerides 10/19/2023 251 (H)  <150 mg/dL Final     Direct Measure HDL 10/19/2023 43  >=40 mg/dL Final     LDL Cholesterol Calculated 10/19/2023 80  <=100 mg/dL Final     Non HDL Cholesterol 10/19/2023 130 (H)  <130 mg/dL Final     Creatinine 10/19/2023 1.13  0.67 - 1.17 mg/dL Final     GFR Estimate 10/19/2023 76  >60 mL/min/1.73m2 Final     Hemoglobin A1C 10/19/2023 6.2 (H)  <5.7 % Final    Normal <5.7%   Prediabetes 5.7-6.4%    Diabetes 6.5% or higher     Note: Adopted from ADA consensus guidelines.     ASSESSMENT/PLAN:    Asymptomatic, well-controlled HIV infection:  Mr. Bee takes Biktarvy (switched in 6/20 from long-standing Combivir / nevirapine) with tight dosing adherence and good drug tolerance.  He has a psteadily normal absolute CD4+ cell count and an undetectable HIV plasma viral load.  He will continue taking Biktarvy and return to [facility] for follow-up in a half year (with HIV and other monitoring laboratory studies in two months from now).  Pre-diabetes:  His most recent hemoglobin A1C was 6.2%.  We discussed that aerobic exercise (as he is now starting to do on a treadmill) is first line therapy, although with watching out for exogenous sugar intake.  Will monitor.  Improved mixed hyperlipidemia:  On an increased (in 8/2023) dose of pravastatin to 40 mg daily and ongoing fenofibrate 108 mg (two 54 mg tablets) PO daily (since 2/15/23) his total cholesterol has decreased to 173 with an LDL of 80 and triglycerides of 251.  He will continue taking that pravastatin dose which he tolerates well.  Treated hypothyroidism on levothyroxine: His levothyroxine dose was increased back up to 150 mcg daily at his 8/2023 appointment and the most recent subsequent THS is normal at 2.86.  It is not clear that his  "multifactorial fatigue is improved, but we will continue this Synthroid dose and recheck his TSH in a couple of months.  Obstructive sleep apnea s/p placement of Inspire device:  Having not tolerated a number fo other therapy attempts (including CPap), he had  an Inspire device implanted by a physician at [facility] in [place] on 2/23/23.  He still does not feel more energetic, but a follow-up sleep study at [facility] showed a marked decrease in the number of sleep interruptions per hour (from 18 pre- to 1.7 post-Inspire) and the device settings are still being \"tweaked\" he says.  Eructation / intermittent loose stools:  This does not sound like a sufficiently troublesome set of symptoms to dictate immediate action, so he will monitor these symptoms in relation to diet and exercise specifics and we will re-discuss this at his next appointment (or sooner if necessary)  Previously elevated creatinine:  The 8/11/23 random creatinine was 1.33, increased from 1.20 on 2/10/23, but is normalized on the next 10/19/23 blood draw down to 76.  Will monitor..  Chronic, intractable depression / anxiety / insomnia / agoraphobia -- presently apparently worse:  Managed by Dr. Johnston, his psychiatrist in the [facility] in [place], who he sees often and who discontinued his lamotrigine medication.  Will defer to Dr. Johnston.  He also now has an appointment monthly with his [facility] .  He now owns a treadmill which he has just recently started using -- we discussed the definition of aerobic exercise and how to set and then gradually advance realistic goals.  Nocturia / presumed benign prostatism / history of Peyronie's disease:  He has had  symptoms consistent with BPH and had palpable prostatic enlargement on 10/18/17 digital rectal exam with a normal PSA screen at that time, so has now been on Flomax 0.8 mg PO q evening (since 6/22 through Urology Clinic) with, apparently " some improvement, since the nocturia and daytime frequency were again not complained of today.  He was seen by Dr. Irwin in Urology clinic on 6/3/22 and underwent cystoscopy which was unremarkable.  Well-controlled essential hypertension:  On ongoing amlodipine 10 mg PO daily (increased 5/17/17, started 5/20/15) and enalapril 20 mg PO BID (started 11/29/11 and dose increased 1/27/15) his clinic check-in blood pressures remain consistently good.  The current prescriptions were renewed today.  Testosterone replacement therapy / previous erectile dysfunction:  His most recent total testosterone level on 8/12/22 remains normal at 452 on ongoing Axiron transcutaneous testosterone topical replacement therapy, initially at two (30 mg) pumps (= 60 mg) daily (since late 1/15), but now at one pump daily since mid-2019.  The 8/11/23 total testosterone level was good at 535.  Will continue the present dose -- prescription renewed today.  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  This was another reason he switched off zidovudine in summer 2020, although there has not been any evident progression of his lipodystrophy for a number of years.  Mild bilateral elbows psoriasis, presently:  This is generally well-controlled with topical Clobex prn, and the present bilateral patches are small.  Health Care Maintenance:  Covid-19 and influenza vaccinations are up to date.  Jynneos Mpox vaccines were given on 8/17/22 and 9/21/22.  Influenza vaccine received 10/10/22 -- will up date again this autumn.  Menactra vaccine given 11/18/15.  Tdap was boosted 1/21/15.  Prevnar 13 given 10/16/13; Pneumovax given 1/21/15.  Shingrix vaccinations were given on 8/17/22 and 10/12/22.  HAV / HBV immunized / seroimmune.  HCV negative 6/1/09.  Negative rectal Pap done by Dr. Sanchez 6/28/10.  Antitreponemal antibody positive since 8/27/09 but  repeat RPR was again (nearly) negative on 8/11/23 with a titer of 1:1 (one dilution off from non-reactive, which is within assay variability) -- will monitor.  Quantiferon Gold was negative 11/12/15.  Sees a dentist annually.  Saw an ophthalmologist in early 2012.  Had a  non-[facility] dermatologist appointment for a complete skin check in early 2019 and was told he had only chronic lentiga without any concerning lesions.  Underwent a routine screening colonoscopy at [facility] in [place], [place], in 1/19 which was normal (without polyps, per the patient) except for diverticulosis  -- encouraged to intake more fiber.  He will investigate if a repeat five year colonoscopy is recommended.      Again, thank you for allowing me to participate in the care of your patient.      Sincerely,    Rc Latham MD

## 2024-02-21 NOTE — NURSING NOTE
"Chief Complaint   Patient presents with    Follow Up     6 month-B20     /74   Pulse 99   Temp 98  F (36.7  C) (Oral)   Ht 1.753 m (5' 9\")   Wt 76.7 kg (169 lb)   BMI 24.96 kg/m    Rebecca Lemus MA on 2/21/2024 at 3:07 PM    "

## 2024-02-26 NOTE — PROGRESS NOTES
Division of Infectious Diseases and International Medicine  Department of Medicine        [facility] OUTPATIENT VISIT NOTE [place] Mail Code [address]  [address] [place] [address]  Office: [phone]  Fax:  [phone]     HISTORY OF PRESENT ILLNESS:    This 58 year old gentleman with asymptomatic HIV infection returns today to clinic for semi-annual follow-up of his antiretroviral therapy comprised of Biktarvy one tablet PO daily (switched to simplify his dosing regimen in 8/20 from Combivir one tablet PO BID plus nevirapine 200 mg PO BID which were started in 5/03), as well as multiple other health issues.  He has not missed a Biktarvy dose in a long while and reports no side effects.  His most recent last  HIV drug toxicity laboratory studies were drawn 10/19/23 and looked good, except for a persistently elevated hemoglobin A1C in the pre-diabetes range at 6.2%.  He has not been exercising or watching his diet much in recent months, but he acquired a treadmill a few weeks ago which he said he has started to use in the past week.  He hopes to use it five days per week.  He is willling to repeat the hemoglobin A1C and HIV labs in a couple of months.  A 10/19/23 TSH was normal (2.86) after he has been back on the 150 mcg dose of levothyroxine for the past six months.  He has had some increased post-prandial eructation, some sensation of bloating, and occasional quite loose stools in the past couple of months and is wondering if he might have some early lactose intolerance.  His most recent past colonoscopy was at [facility] in [place], [place], in 1/2019 and he is wondering if that is due for a repeat now, but I do not have the procedure report (with the recommendation) available via [employer] so can not say.  He has been working (and checking in monthly) with his [employer]  and is considering getting a port-time job.   "His bilateral elbow psoriasis has been fairly minimal and well-controlled in the past year or so without much us of topical medication.  He continues to wake multiple times per night despite the implantation of the Inspire implant at [facility] on 2/23/23 for obstructive sleep apnea, but a subsequent sleep study showed fewer wakings than pre-implant and the settings on the device are still being \"tweaked\".  With Flomax 0.8 mg daily for BPH he is still having nocturia, but it is stable.  On an increased dose (since 8/2023) of pravastatin 40 mg daily his LDL cholesterol improved in 10/2023 to 80, total cholesterol to 173, and triglycerides down to 251.  He has not noticed ill effects from that increased statin dose.  His chronic depression sounds to be roughly stable and he is looking forward to days of longer daylight.  He continues to be followed by his psychiatrist, Dr. Johnston in [place],  He remains on amlodipine and enalapril for hypertension with ongoing good blood pressure control (including a clinic check-in blood pressure today.  He continues to apply Axiron topically for hypotestosteronism (with an 8/11/23 total testosterone level of 535).  Beyond these issues, he says his health is stable lately without additional concerns or complaint today, including no recent febrile or EENT symptoms, cough, dyspnea, chest pain, GI or  symptoms, visible rash, or neurological symptoms.    PAST MEDICAL HISTORY:    HIV, diagnosed 7/93, previously treated with AZT, 3TC and Crixivan. He was then on AZT plus efavirenz, but in 5/03 began Combivir/Sustiva and has remained virologically suppressed on this regimen since.  Stable chronic stage 1 kidney disease:  Nephrology Clinic evaluation (including negative MRI scan) unremarkable in 2009.  Hyperlipidemia previously requiring multiple medications, more hypertriglyceridemia than hypercholesterolemia -- now on fenofibrate plus Pravachol which was added 4/18/18.   Hyperthyroidism on " levothyroxine.   History of difficult-to-treat anxiety and depression -- followed for a number of years by psychiatrist Dr. Abebe Johnston in [place].   Hypertension treated with Vasotec.   Acute appendicitis with rupture in the summer of 2007.  Cholecystectomy in the summer of 2007.   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  Followed there annually.   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  Previously received Sculptra.   History of a low testosterone level, most recently checked in 1/09 with a normal free testosterone of 14.0 and a total testosterone in the normal range of 482, managed somewhat unorthodoxly by a now-retired Urologist until 7/13.  Since then, has continued taking biweekly home exogenous testosterone IM injections and has tapered them only marginally from ~ weekly to ~ every other week (or a bit less frequent).  Total testosterone level on 5/14/14 was 2926.  Syphilis diagnosed 5/08, peak titer of 1:256, completed a treatment course of IM penicillin times 3, last dose in 6/08.  Prior rectal warts, treated with Aldara cream.  Infected lower left cracked mandibular molar tooth, repaired 6/10.  Unintentional overdose of alcohol, hydrocodone, and clonazepam for which hospitalized in [place] in 10/12.  Mild intermittent controlled extensor limb psoriasis.  Sleep apnea, diagnosed elsewhere.  Inspire implant inserted on 2/23/23.    Past Surgical History:   Procedure Laterality Date    APPENDECTOMY OPEN  Summer 2007.    For acute appendicitis with rupture.    CHOLECYSTECTOMY  2007.     ALLERGIES:  Penicillin and iodine.    CURRENT MEDICATIONS:  Current Outpatient Medications   Medication Sig Dispense Refill    amLODIPine (NORVASC) 10 MG tablet Take 1 tablet (10 mg) by mouth daily 90 tablet 3    BIKTARVY -25 MG per  tablet TAKE ONE TABLET BY MOUTH ONCE DAILY 90 tablet 3    buPROPion (WELLBUTRIN XL) 150 MG 24 hr tablet 150 mg daily Patient takes 3 tabs daily for total 450mg      enalapril (VASOTEC) 20 MG tablet Take 1 tablet (20 mg) by mouth 2 times daily 180 tablet 1    fenofibrate (LOFIBRA) 54 MG tablet Take 2 tablets (108 mg) by mouth daily 180 tablet 3    hydrOXYzine (ATARAX) 25 MG tablet       lamoTRIgine (LAMICTAL) 100 MG tablet       levothyroxine (SYNTHROID/LEVOTHROID) 150 MCG tablet Take 1 tablet (150 mcg) by mouth daily 90 tablet 3    multivitamin w/minerals (THERA-VIT-M) tablet Take 1 tablet by mouth daily      pravastatin (PRAVACHOL) 40 MG tablet Take 1 tablet (40 mg) by mouth daily 90 tablet 3    tadalafil (CIALIS) 20 MG tablet Take 1 tablet (20 mg) by mouth daily as needed (take 2 hours before intercourse) 30 tablet 11    tamsulosin (FLOMAX) 0.4 MG capsule Take 2 capsules (0.8 mg) by mouth every evening 180 capsule 3    testosterone (AXIRON) 30 MG/ACT topical solution Place 1 pump (30 mg) onto the skin daily 270 mL 5    traZODone (DESYREL) 50 MG tablet        FAMILY HISTORY:  Family History   Problem Relation Age of Onset    Depression Father     Hyperlipidemia Father     Heart Disease Maternal Grandmother     Myocardial Infarction Maternal Grandfather         Sudden MI / death at age 51.    Hyperlipidemia Maternal Grandfather     Obesity Paternal Grandmother     Depression Other         Multiple family members.    Hyperlipidemia Other         Multiple family members (Parents, brother, sister, PGM)    Retinitis pigmentosa Maternal Uncle     Glaucoma No family hx of     Macular Degeneration No family hx of      SOCIAL HISTORY:  Lives alone in basement apartment in [place] abut spends much of his time at his parents home in [place] in the town he grew up in and where all his siblings still live.  Unemployed, on disability based on his depression diagnosis.  Sexually active, no single steady partner,  mostly with other HIV seropositive men, but always discloses his status when with HIV seronegative partners and uses protection.  In the past, has visited friends in [place] for an extended stay.  Tobacco:  Long-standing, up to 2.5 ppd in the past.  Presently 1 ppd (but smokes only half of each cigarette).  Has quit for up to three weeks several times in the past; not interested in further attempts at reduction at present.  Has a treadmill in the basement of his parents home that he can use for indoor exercise during cold weather, although he does so infrequently.  His father was diagnosed with cancer in winter 2019-20.    REVIEW OF SYSTEMS:  As per HPI.    Answers submitted by the patient for this visit:  Symptoms you have experienced in the last 30 days (Submitted on 8/10/2023)  General Symptoms: Yes  Skin Symptoms: Yes  HENT Symptoms: Yes  EYE SYMPTOMS: No  HEART SYMPTOMS: No  LUNG SYMPTOMS: Yes  INTESTINAL SYMPTOMS: Yes  URINARY SYMPTOMS: Yes  REPRODUCTIVE SYMPTOMS: No  SKELETAL SYMPTOMS: No  BLOOD SYMPTOMS: No  NERVOUS SYSTEM SYMPTOMS: No  MENTAL HEALTH SYMPTOMS: Yes  Please answer the questions below to tell us what conditions you are experiencing: (Submitted on 8/10/2023)  Ear pain: No  Ear discharge: No  Hearing loss: No  Tinnitus: No  Nosebleeds: No  Congestion: No  Sinus pain: No  Trouble swallowing: No   Voice hoarseness: No  Mouth sores: No  Sore throat: No  Tooth pain: Yes  Gum tenderness: No  Bleeding gums: No  Change in taste: No  Change in sense of smell: No  Dry mouth: No  Hearing aid used: No  Neck lump: No  Please answer the questions below to tell us what conditions you are experiencing: (Submitted on 8/10/2023)  Fever: No  Loss of appetite: No  Weight gain: Yes  Fatigue: Yes  Night sweats: No  Chills: No  Increased stress: Yes  Excessive hunger: No  Excessive thirst: No  Feeling hot or cold when others believe the temperature is normal: No  Loss of height: No  Post-operative complications:  No  Surgical site pain: No  Hallucinations: No  Change in or Loss of Energy: Yes  Hyperactivity: No  Confusion: No   (Submitted on 8/10/2023)  Changes in hair: No  Changes in moles/birth marks: No  Itching: Yes  Rashes: No  Changes in nails: No  Acne: No  Change in facial hair: No  Warts: No  Non-healing sores: No  Scarring: No  Flaking of skin: No  Color changes of hands/feet in cold : No  Sun sensitivity: No  Skin thickening: No  Please answer the questions below to tell us what condition you are experiencing: (Submitted on 8/10/2023)  Cough: Yes  Sputum or phlegm: No  Coughing up blood: No  Difficulty breating or shortness of breath: No  Snoring: Yes  Wheezing: No  Difficulty breathing on exertion: No  Nighttime Cough: Yes  Difficulty breathing when lying flat: No  Please answer the questions below to tell us what conditions you are experiencing: (Submitted on 8/10/2023)  Heart burn or indigestion: No  Nausea: No  Vomiting: No  Abdominal pain: No  Bloating: No  Constipation: No  Diarrhea: Yes  Blood in stool: No  Black stools: No  Rectal or Anal pain: No  Fecal incontinence: No  Yellowing of skin or eyes: No  Vomit with blood: No  Change in stools: No  Please answer the questions below to tell us what condition you are experiencing: (Submitted on 8/10/2023)  Trouble holding urine or incontinence: No  Pain or burning: No  Trouble starting or stopping: No  Increased frequency of urination: Yes  Blood in urine: No  Decreased frequency of urination: No  Frequent nighttime urination: Yes  Flank pain: No  Difficulty emptying bladder: Yes  Please answer the questions below to tell us what condition you are experiencing: (Submitted on 8/10/2023)  Nervous or Anxious: Yes  Depression: Yes  Trouble sleeping: Yes  Trouble thinking or concentrating: Yes  Mood changes: Yes  Panic attacks: Yes    PHYSICAL EXAMINATION:  General:  A pleasant, conversant, WDWN 58-year-old man in no discomfort.  Vital Signs:  /74   Pulse 99   " Temp 98  F (36.7  C) (Oral)   Ht 1.753 m (5' 9\")   Wt 76.7 kg (169 lb)   BMI 24.96 kg/m   (weight is stable).  Skin:  No new rash or lesion.  There are two small (~ 3 cm diameter) patches of bilateral posterior elbow psoriasis without other lesions elsewhere.  Scattered chronic keratoses are stable. Sclerae are white.  Conjunctivae are pink bilaterally.  Oropharynx has no erythema, exudate or lesion.  Neck is supple without lymphadenopathy or thyromegaly.  Lungs:  Bilaterally clear to auscultation.  CV:  RRR, normal S1, S2 without gallop, murmur or rub.  Abdomen:  Bowel sounds normal, soft, nontender, no hepatosplenomegaly, old surgical scar unchanged.  Back:  No lower spine or CVA tenderness.  Extremities:  Distally warm, no edema.  Neurological:  Alert, oriented, memory / cognition / affect intact.  Gait is normal.    LABORATORY STUDIES (Reviewed with the patient during his appointment today):    T Cell Subset:  Absolute CD4   Date Value Ref Range Status   12/11/2020 1,087 441 - 2,156 cells/uL Final     Absolute CD4, [place] T Cells   Date Value Ref Range Status   08/11/2023 1,156 441 - 2,156 cells/uL Final     CD4 [place] T   Date Value Ref Range Status   12/11/2020 35 28 - 63 % Final     CD4% [place] T Cells   Date Value Ref Range Status   08/11/2023 35 28 - 63 % Final     CD8 Suppressor T   Date Value Ref Range Status   12/11/2020 58 (H) 10 - 40 % Final     CD8% Suppressor T Cells   Date Value Ref Range Status   08/11/2023 58 (H) 10 - 40 % Final     CD3 Mature T   Date Value Ref Range Status   12/11/2020 89 (H) 49 - 84 % Final     CD3% Total T Cells   Date Value Ref Range Status   08/11/2023 88 (H) 49 - 84 % Final     CD4:CD8 Ratio   Date Value Ref Range Status   08/11/2023 0.60 (L) 1.40 - 2.60 Final   12/11/2020 0.60 (L) 1.40 - 2.60 Final     WBC   Date Value Ref Range Status   12/11/2020 8.2 4.0 - 11.0 10e9/L Final     WBC Count   Date Value Ref Range Status   08/11/2023 10.4 4.0 - 11.0 10e3/uL Final     % " Lymphocytes   Date Value Ref Range Status   08/11/2023 30 % Final   12/11/2020 32.9 % Final     Absolute CD3   Date Value Ref Range Status   12/11/2020 2,747 603 - 2,990 cells/uL Final     Absolute CD3, Total T Cells   Date Value Ref Range Status   08/11/2023 2,934 603 - 2,990 cells/uL Final     Absolute CD8   Date Value Ref Range Status   12/11/2020 1,786 (H) 125 - 1,312 cells/uL Final     Absolute CD8, Suppressor T Cells   Date Value Ref Range Status   08/11/2023 1,919 (H) 125 - 1,312 cells/uL Final     HIV-1 RNA Quantitative:  HIV-1 RNA Quant Result   Date Value Ref Range Status   12/11/2020 HIV-1 RNA Not Detected HIVND^HIV-1 RNA Not Detected [Copies]/mL Final     Comment:     The BEV AmpliPrep/BEV TaqMan HIV-1 test is an FDA-approved in vitro   nucleic acid amplification test for the quantitation of HIV-1 RNA in human   plasma (EDTA plasma) using the BEV AmpliPrep instrument for automated viral   nucleic acid extraction and the BEV TaqMan Analyzer or [employer] TaqMan for   automated Real Time PCR amplification and detection of the viral nucleic acid   target.  Titer results are reported in copies/ml. This assay is intended for use in   conjunction with clinical presentation and other laboratory markers of disease   prognosis and for use as an aid in assessing viral response to antiretroviral   treatment as measured by changes in plasma HIV-1 RNA levels. This test should   not be used as a donor screening test to confirm the presence of HIV-1   infection.       HIV-1 RNA copies/mL   Date Value Ref Range Status   08/11/2023 Not Detected Not Detected Copies/mL Final   02/10/2023 Not Detected Not Detected Copies/mL Final      TSH 10/19/2023 2.86  0.30 - 4.20 uIU/mL Final    Cholesterol 10/19/2023 173  <200 mg/dL Final    Triglycerides 10/19/2023 251 (H)  <150 mg/dL Final    Direct Measure HDL 10/19/2023 43  >=40 mg/dL Final    LDL Cholesterol Calculated 10/19/2023 80  <=100 mg/dL Final    Non HDL Cholesterol  10/19/2023 130 (H)  <130 mg/dL Final    Creatinine 10/19/2023 1.13  0.67 - 1.17 mg/dL Final    GFR Estimate 10/19/2023 76  >60 mL/min/1.73m2 Final    Hemoglobin A1C 10/19/2023 6.2 (H)  <5.7 % Final    Normal <5.7%   Prediabetes 5.7-6.4%    Diabetes 6.5% or higher     Note: Adopted from ADA consensus guidelines.     ASSESSMENT/PLAN:    Asymptomatic, well-controlled HIV infection:  Mr. Bee takes Biktarvy (switched in 6/20 from long-standing Combivir / nevirapine) with tight dosing adherence and good drug tolerance.  He has a psteadily normal absolute CD4+ cell count and an undetectable HIV plasma viral load.  He will continue taking Biktarvy and return to [facility] for follow-up in a half year (with HIV and other monitoring laboratory studies in two months from now).  Pre-diabetes:  His most recent hemoglobin A1C was 6.2%.  We discussed that aerobic exercise (as he is now starting to do on a treadmill) is first line therapy, although with watching out for exogenous sugar intake.  Will monitor.  Improved mixed hyperlipidemia:  On an increased (in 8/2023) dose of pravastatin to 40 mg daily and ongoing fenofibrate 108 mg (two 54 mg tablets) PO daily (since 2/15/23) his total cholesterol has decreased to 173 with an LDL of 80 and triglycerides of 251.  He will continue taking that pravastatin dose which he tolerates well.  Treated hypothyroidism on levothyroxine: His levothyroxine dose was increased back up to 150 mcg daily at his 8/2023 appointment and the most recent subsequent THS is normal at 2.86.  It is not clear that his multifactorial fatigue is improved, but we will continue this Synthroid dose and recheck his TSH in a couple of months.  Obstructive sleep apnea s/p placement of Inspire device:  Having not tolerated a number fo other therapy attempts (including CPap), he had  an Inspire device implanted by a physician at [facility] in [place] on 2/23/23.  He still does not feel more energetic, but a  "follow-up sleep study at [facility] showed a marked decrease in the number of sleep interruptions per hour (from 18 pre- to 1.7 post-Inspire) and the device settings are still being \"tweaked\" he says.  Eructation / intermittent loose stools:  This does not sound like a sufficiently troublesome set of symptoms to dictate immediate action, so he will monitor these symptoms in relation to diet and exercise specifics and we will re-discuss this at his next appointment (or sooner if necessary)  Previously elevated creatinine:  The 8/11/23 random creatinine was 1.33, increased from 1.20 on 2/10/23, but is normalized on the next 10/19/23 blood draw down to 76.  Will monitor..  Chronic, intractable depression / anxiety / insomnia / agoraphobia -- presently apparently worse:  Managed by Dr. Johnston, his psychiatrist in the [facility] in [place], who he sees often and who discontinued his lamotrigine medication.  Will defer to Dr. Johnston.  He also now has an appointment monthly with his [employer] [facility] [place] [employer] .  He now owns a treadmill which he has just recently started using -- we discussed the definition of aerobic exercise and how to set and then gradually advance realistic goals.  Nocturia / presumed benign prostatism / history of Peyronie's disease:  He has had  symptoms consistent with BPH and had palpable prostatic enlargement on 10/18/17 digital rectal exam with a normal PSA screen at that time, so has now been on Flomax 0.8 mg PO q evening (since 6/22 through Urology Clinic) with, apparently some improvement, since the nocturia and daytime frequency were again not complained of today.  He was seen by Dr. Irwin in Urology clinic on 6/3/22 and underwent cystoscopy which was unremarkable.  Well-controlled essential hypertension:  On ongoing amlodipine 10 mg PO daily (increased 5/17/17, started 5/20/15) and enalapril 20 mg PO BID (started 11/29/11 and dose increased " 1/27/15) his clinic check-in blood pressures remain consistently good.  The current prescriptions were renewed today.  Testosterone replacement therapy / previous erectile dysfunction:  His most recent total testosterone level on 8/12/22 remains normal at 452 on ongoing Axiron transcutaneous testosterone topical replacement therapy, initially at two (30 mg) pumps (= 60 mg) daily (since late 1/15), but now at one pump daily since mid-2019.  The 8/11/23 total testosterone level was good at 535.  Will continue the present dose -- prescription renewed today.  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  This was another reason he switched off zidovudine in summer 2020, although there has not been any evident progression of his lipodystrophy for a number of years.  Mild bilateral elbows psoriasis, presently:  This is generally well-controlled with topical Clobex prn, and the present bilateral patches are small.  Health Care Maintenance:  Covid-19 and influenza vaccinations are up to date.  Jynneos Mpox vaccines were given on 8/17/22 and 9/21/22.  Influenza vaccine received 10/10/22 -- will up date again this autumn.  Menactra vaccine given 11/18/15.  Tdap was boosted 1/21/15.  Prevnar 13 given 10/16/13; Pneumovax given 1/21/15.  Shingrix vaccinations were given on 8/17/22 and 10/12/22.  HAV / HBV immunized / seroimmune.  HCV negative 6/1/09.  Negative rectal Pap done by Dr. Sanchez 6/28/10.  Antitreponemal antibody positive since 8/27/09 but repeat RPR was again (nearly) negative on 8/11/23 with a titer of 1:1 (one dilution off from non-reactive, which is within assay variability) -- will monitor.  Quantiferon Gold was negative 11/12/15.  Sees a dentist annually.  Saw an ophthalmologist in early 2012.  Had a  non-[facility] dermatologist appointment for a complete skin check in early 2019 and was told he had only  chronic lentiga without any concerning lesions.  Underwent a routine screening colonoscopy at [facility] in [place], [place], in 1/19 which was normal (without polyps, per the patient) except for diverticulosis  -- encouraged to intake more fiber.  He will investigate if a repeat five year colonoscopy is recommended.

## 2024-03-11 RX ORDER — TESTOSTERONE 30 MG/1.5ML
SOLUTION TOPICAL
Qty: 270 ML | Refills: 5 | Status: SHIPPED | OUTPATIENT
Start: 2024-03-11

## 2024-03-23 ENCOUNTER — HEALTH MAINTENANCE LETTER (OUTPATIENT)
Age: 59
End: 2024-03-23

## 2024-04-24 ENCOUNTER — LAB (OUTPATIENT)
Dept: LAB | Facility: CLINIC | Age: 59
End: 2024-04-24
Attending: INTERNAL MEDICINE
Payer: COMMERCIAL

## 2024-04-24 DIAGNOSIS — Z21 HUMAN IMMUNODEFICIENCY VIRUS I INFECTION (H): ICD-10-CM

## 2024-04-24 DIAGNOSIS — R73.03 PRE-DIABETES: ICD-10-CM

## 2024-04-24 DIAGNOSIS — E03.9 ACQUIRED HYPOTHYROIDISM: ICD-10-CM

## 2024-04-24 DIAGNOSIS — E34.9 HYPOTESTOSTERONISM: ICD-10-CM

## 2024-04-24 LAB
ALBUMIN SERPL BCG-MCNC: 4.6 G/DL (ref 3.5–5.2)
ALP SERPL-CCNC: 66 U/L (ref 40–150)
ALT SERPL W P-5'-P-CCNC: 21 U/L (ref 0–70)
AMYLASE SERPL-CCNC: 49 U/L (ref 28–100)
ANION GAP SERPL CALCULATED.3IONS-SCNC: 11 MMOL/L (ref 7–15)
AST SERPL W P-5'-P-CCNC: 19 U/L (ref 0–45)
BASOPHILS # BLD AUTO: 0.1 10E3/UL (ref 0–0.2)
BASOPHILS NFR BLD AUTO: 1 %
BILIRUB SERPL-MCNC: 0.4 MG/DL
BUN SERPL-MCNC: 14.6 MG/DL (ref 6–20)
CALCIUM SERPL-MCNC: 9.7 MG/DL (ref 8.6–10)
CD3 CELLS # BLD: 2230 CELLS/UL (ref 603–2990)
CD3 CELLS NFR BLD: 88 % (ref 49–84)
CD3+CD4+ CELLS # BLD: 894 CELLS/UL (ref 441–2156)
CD3+CD4+ CELLS NFR BLD: 35 % (ref 28–63)
CD3+CD4+ CELLS/CD3+CD8+ CLL BLD: 0.62 % (ref 1.4–2.6)
CD3+CD8+ CELLS # BLD: 1449 CELLS/UL (ref 125–1312)
CD3+CD8+ CELLS NFR BLD: 57 % (ref 10–40)
CHLORIDE SERPL-SCNC: 105 MMOL/L (ref 98–107)
CREAT SERPL-MCNC: 1.19 MG/DL (ref 0.67–1.17)
DEPRECATED HCO3 PLAS-SCNC: 24 MMOL/L (ref 22–29)
EGFRCR SERPLBLD CKD-EPI 2021: 72 ML/MIN/1.73M2
EOSINOPHIL # BLD AUTO: 0.3 10E3/UL (ref 0–0.7)
EOSINOPHIL NFR BLD AUTO: 4 %
ERYTHROCYTE [DISTWIDTH] IN BLOOD BY AUTOMATED COUNT: 13.2 % (ref 10–15)
GLUCOSE SERPL-MCNC: 119 MG/DL (ref 70–99)
HBA1C MFR BLD: 6.2 %
HCT VFR BLD AUTO: 40.2 % (ref 40–53)
HGB BLD-MCNC: 14.2 G/DL (ref 13.3–17.7)
IMM GRANULOCYTES # BLD: 0 10E3/UL
IMM GRANULOCYTES NFR BLD: 0 %
LIPASE SERPL-CCNC: 42 U/L (ref 13–60)
LYMPHOCYTES # BLD AUTO: 2.3 10E3/UL (ref 0.8–5.3)
LYMPHOCYTES NFR BLD AUTO: 31 %
MCH RBC QN AUTO: 31.7 PG (ref 26.5–33)
MCHC RBC AUTO-ENTMCNC: 35.3 G/DL (ref 31.5–36.5)
MCV RBC AUTO: 90 FL (ref 78–100)
MONOCYTES # BLD AUTO: 0.5 10E3/UL (ref 0–1.3)
MONOCYTES NFR BLD AUTO: 7 %
NEUTROPHILS # BLD AUTO: 4.2 10E3/UL (ref 1.6–8.3)
NEUTROPHILS NFR BLD AUTO: 57 %
NRBC # BLD AUTO: 0 10E3/UL
NRBC BLD AUTO-RTO: 0 /100
PLATELET # BLD AUTO: 354 10E3/UL (ref 150–450)
POTASSIUM SERPL-SCNC: 4.3 MMOL/L (ref 3.4–5.3)
PROT SERPL-MCNC: 7.4 G/DL (ref 6.4–8.3)
RBC # BLD AUTO: 4.48 10E6/UL (ref 4.4–5.9)
SODIUM SERPL-SCNC: 140 MMOL/L (ref 135–145)
T CELL COMMENT: ABNORMAL
TSH SERPL DL<=0.005 MIU/L-ACNC: 2.36 UIU/ML (ref 0.3–4.2)
WBC # BLD AUTO: 7.3 10E3/UL (ref 4–11)

## 2024-04-24 PROCEDURE — 87536 HIV-1 QUANT&REVRSE TRNSCRPJ: CPT | Performed by: INTERNAL MEDICINE

## 2024-04-24 PROCEDURE — 36415 COLL VENOUS BLD VENIPUNCTURE: CPT | Performed by: PATHOLOGY

## 2024-04-24 PROCEDURE — 82150 ASSAY OF AMYLASE: CPT | Performed by: PATHOLOGY

## 2024-04-24 PROCEDURE — 84443 ASSAY THYROID STIM HORMONE: CPT | Performed by: PATHOLOGY

## 2024-04-24 PROCEDURE — 84403 ASSAY OF TOTAL TESTOSTERONE: CPT | Performed by: INTERNAL MEDICINE

## 2024-04-24 PROCEDURE — 86359 T CELLS TOTAL COUNT: CPT | Performed by: INTERNAL MEDICINE

## 2024-04-24 PROCEDURE — 83690 ASSAY OF LIPASE: CPT | Performed by: PATHOLOGY

## 2024-04-24 PROCEDURE — 86593 SYPHILIS TEST NON-TREP QUANT: CPT | Performed by: INTERNAL MEDICINE

## 2024-04-24 PROCEDURE — 86592 SYPHILIS TEST NON-TREP QUAL: CPT | Performed by: INTERNAL MEDICINE

## 2024-04-24 PROCEDURE — 80053 COMPREHEN METABOLIC PANEL: CPT | Performed by: PATHOLOGY

## 2024-04-24 PROCEDURE — 99000 SPECIMEN HANDLING OFFICE-LAB: CPT | Performed by: PATHOLOGY

## 2024-04-24 PROCEDURE — 83036 HEMOGLOBIN GLYCOSYLATED A1C: CPT | Performed by: INTERNAL MEDICINE

## 2024-04-24 PROCEDURE — 85025 COMPLETE CBC W/AUTO DIFF WBC: CPT | Performed by: PATHOLOGY

## 2024-04-25 LAB
HIV1 RNA # PLAS NAA DL=20: NOT DETECTED COPIES/ML
RPR SER QL: REACTIVE
RPR SER-TITR: NORMAL {TITER}

## 2024-04-26 LAB — TESTOST SERPL-MCNC: 259 NG/DL (ref 240–950)

## 2024-07-15 ENCOUNTER — TELEPHONE (OUTPATIENT)
Dept: INFECTIOUS DISEASES | Facility: CLINIC | Age: 59
End: 2024-07-15
Payer: COMMERCIAL

## 2024-07-15 NOTE — TELEPHONE ENCOUNTER
EP LVM 7/15 to resched 10/23 follow up with Dr. Latham; provider switching clinic to Tuesdays. --2nd attempt.

## 2024-07-17 ENCOUNTER — TELEPHONE (OUTPATIENT)
Dept: INFECTIOUS DISEASES | Facility: CLINIC | Age: 59
End: 2024-07-17
Payer: COMMERCIAL

## 2024-07-17 DIAGNOSIS — R73.03 PRE-DIABETES: ICD-10-CM

## 2024-07-17 DIAGNOSIS — Z21 HUMAN IMMUNODEFICIENCY VIRUS I INFECTION (H): Primary | ICD-10-CM

## 2024-07-17 DIAGNOSIS — E78.2 MIXED HYPERLIPIDEMIA: ICD-10-CM

## 2024-07-17 DIAGNOSIS — E03.9 ACQUIRED HYPOTHYROIDISM: ICD-10-CM

## 2024-07-17 DIAGNOSIS — Z20.89 CONTACT WITH AND (SUSPECTED) EXPOSURE TO OTHER COMMUNICABLE DISEASES: ICD-10-CM

## 2024-07-17 NOTE — TELEPHONE ENCOUNTER
[facility] Call Center    Phone Message    May a detailed message be left on voicemail: yes     Reason for Call: Other: Pt schedule for 10/22/2024 with Dr. Latham. Would like to know if labs are required prior to his visit and if he will have to fast. No current orders on file, if labs are needed please place orders and follow up with pt.      Action Taken: Other: ID    Travel Screening: Not Applicable     Date of Service:

## 2024-07-18 ENCOUNTER — TELEPHONE (OUTPATIENT)
Dept: INFECTIOUS DISEASES | Facility: CLINIC | Age: 59
End: 2024-07-18
Payer: COMMERCIAL

## 2024-07-18 NOTE — TELEPHONE ENCOUNTER
Per Dr. Latham last note labs entered and  will send to CC to schedule lab appt and inform pt.       Chiki Cai RN  Infectious Disease on 7/18/2024 at 7:13 AM

## 2024-07-18 NOTE — TELEPHONE ENCOUNTER
DEE BOYKINM 7/18 to let pt know about 10/15 lab appt; to be sched 1 week prior to follow up with Dr. Latham and to call ID if day and time for labs don't work.

## 2024-08-10 ENCOUNTER — HEALTH MAINTENANCE LETTER (OUTPATIENT)
Age: 59
End: 2024-08-10

## 2024-08-19 DIAGNOSIS — E03.9 ACQUIRED HYPOTHYROIDISM: ICD-10-CM

## 2024-08-19 DIAGNOSIS — E78.2 MIXED HYPERLIPIDEMIA: ICD-10-CM

## 2024-08-19 DIAGNOSIS — N40.1 BENIGN PROSTATIC HYPERPLASIA WITH NOCTURIA: ICD-10-CM

## 2024-08-19 DIAGNOSIS — R35.1 BENIGN PROSTATIC HYPERPLASIA WITH NOCTURIA: ICD-10-CM

## 2024-08-19 DIAGNOSIS — I10 ESSENTIAL HYPERTENSION: ICD-10-CM

## 2024-08-19 RX ORDER — PRAVASTATIN SODIUM 40 MG
40 TABLET ORAL DAILY
Qty: 90 TABLET | Refills: 0 | Status: SHIPPED | OUTPATIENT
Start: 2024-08-19

## 2024-08-19 RX ORDER — FENOFIBRATE 54 MG/1
108 TABLET ORAL DAILY
Qty: 180 TABLET | Refills: 0 | Status: SHIPPED | OUTPATIENT
Start: 2024-08-19

## 2024-08-19 RX ORDER — TAMSULOSIN HYDROCHLORIDE 0.4 MG/1
CAPSULE ORAL
Qty: 180 CAPSULE | Refills: 0 | Status: SHIPPED | OUTPATIENT
Start: 2024-08-19

## 2024-08-19 RX ORDER — LEVOTHYROXINE SODIUM 150 UG/1
150 TABLET ORAL DAILY
Qty: 90 TABLET | Refills: 0 | Status: SHIPPED | OUTPATIENT
Start: 2024-08-19

## 2024-08-19 RX ORDER — AMLODIPINE BESYLATE 10 MG/1
10 TABLET ORAL DAILY
Qty: 90 TABLET | Refills: 0 | Status: SHIPPED | OUTPATIENT
Start: 2024-08-19

## 2024-08-19 NOTE — TELEPHONE ENCOUNTER
"Medication Refill                                                     Refill request receive for:  tamsulosin (FLOMAX) 0.4 MG capsule AND fenofibrate (LOFIBRA) 54 MG tablet AND amLODIPine (NORVASC) 10 MG tablet  AND levothyroxine (SYNTHROID/LEVOTHROID) 150 MCG tablet AND pravastatin (PRAVACHOL) 40 MG tablet     Last refill: Flomax - 08/16/2023. Lofribra - 08/16/2023. Norvasc - 08/16/2023. Levothyroxine - 08/16/2023. Pravachol - 08/16/2023.    Last Office Visit 2/21/2024  Future appt scheduled? Yes: 10/22/24     POC for medication if indicated from last note including duration of treatment if applicable:     Flomax - \"has now been on Flomax 0.8 mg PO q evening (since 6/22 through [facility]) with, apparently some improvement, since the nocturia and daytime frequency were again not complained of today.\"  Lofibra - \"Hyperlipidemia previously requiring multiple medications, more hypertriglyceridemia than hypercholesterolemia -- now on fenofibrate plus Pravachol which was added 4/18/18.\"  Norvasc - \"On ongoing amlodipine 10 mg PO daily (increased 5/17/17, started 5/20/15) and enalapril 20 mg PO BID (started 11/29/11 and dose increased 1/27/15) his clinic check-in blood pressures remain consistently good. The current prescriptions were renewed today. \"  Levothyroxine - \"It is not clear that his multifactorial fatigue is improved, but we will continue this Synthroid dose and recheck his TSH in a couple of months.\"  Pravachol - \"He will continue taking that pravastatin dose which he tolerates well.\"      RECENT LABS/VITALS                                                        Lab Results   Component Value Date    AST 19 04/24/2024    AST 33 12/11/2020     Lab Results   Component Value Date    ALT 21 04/24/2024    ALT 44 12/11/2020     Creatinine   Date Value Ref Range Status   04/24/2024 1.19 (H) 0.67 - 1.17 mg/dL Final   12/11/2020 1.19 0.66 - 1.25 mg/dL Final   ]  Alkaline Phosphatase   Date/Time Value Ref Range " "Status   04/24/2024 10:08 AM 66 40 - 150 U/L Final     Comment:     Reference intervals for this test were updated on 11/14/2023 to more accurately reflect our healthy population. There may be differences in the flagging of prior results with similar values performed with this method. Interpretation of those prior results can be made in the context of the updated reference intervals.   12/11/2020 01:23 PM 76 40 - 150 U/L Final     No results found for: \"LABAPCBCDIFF\"                    "

## 2024-08-20 ENCOUNTER — TELEPHONE (OUTPATIENT)
Dept: INFECTIOUS DISEASES | Facility: CLINIC | Age: 59
End: 2024-08-20
Payer: COMMERCIAL

## 2024-08-20 NOTE — TELEPHONE ENCOUNTER
EP called 8/20 to resched 10/22 follow up with Dr. Latham; provider not avail. And resched lab appt closer to current follow up with provider.

## 2024-09-18 DIAGNOSIS — I10 ESSENTIAL HYPERTENSION: ICD-10-CM

## 2024-09-19 RX ORDER — ENALAPRIL MALEATE 20 MG/1
20 TABLET ORAL 2 TIMES DAILY
Qty: 180 TABLET | Refills: 0 | Status: SHIPPED | OUTPATIENT
Start: 2024-09-19

## 2024-09-19 NOTE — TELEPHONE ENCOUNTER
"Medication Refill                                                     Refill request receive for: Enlapril    Last refill: 08/16/2023    Last Office Visit 2/21/2024  Future appt scheduled? Yes: 11/5/2024     POC for medication if indicated from last note including duration of treatment if applicable: yes      RECENT LABS/VITALS                                                        Lab Results   Component Value Date    AST 19 04/24/2024    AST 33 12/11/2020     Lab Results   Component Value Date    ALT 21 04/24/2024    ALT 44 12/11/2020     Creatinine   Date Value Ref Range Status   04/24/2024 1.19 (H) 0.67 - 1.17 mg/dL Final   12/11/2020 1.19 0.66 - 1.25 mg/dL Final   ]  Alkaline Phosphatase   Date/Time Value Ref Range Status   04/24/2024 10:08 AM 66 40 - 150 U/L Final     Comment:     Reference intervals for this test were updated on 11/14/2023 to more accurately reflect our healthy population. There may be differences in the flagging of prior results with similar values performed with this method. Interpretation of those prior results can be made in the context of the updated reference intervals.   12/11/2020 01:23 PM 76 40 - 150 U/L Final     No results found for: \"LABAPCBCDIFF\"                  "

## 2024-10-29 ENCOUNTER — LAB (OUTPATIENT)
Dept: LAB | Facility: CLINIC | Age: 59
End: 2024-10-29
Payer: COMMERCIAL

## 2024-10-29 DIAGNOSIS — E03.9 ACQUIRED HYPOTHYROIDISM: ICD-10-CM

## 2024-10-29 DIAGNOSIS — E78.2 MIXED HYPERLIPIDEMIA: ICD-10-CM

## 2024-10-29 DIAGNOSIS — Z20.89 CONTACT WITH AND (SUSPECTED) EXPOSURE TO OTHER COMMUNICABLE DISEASES: ICD-10-CM

## 2024-10-29 DIAGNOSIS — Z21 HUMAN IMMUNODEFICIENCY VIRUS I INFECTION (H): ICD-10-CM

## 2024-10-29 LAB
ALBUMIN SERPL BCG-MCNC: 4.4 G/DL (ref 3.5–5.2)
ALP SERPL-CCNC: 68 U/L (ref 40–150)
ALT SERPL W P-5'-P-CCNC: 15 U/L (ref 0–70)
AMYLASE SERPL-CCNC: 49 U/L (ref 28–100)
ANION GAP SERPL CALCULATED.3IONS-SCNC: 13 MMOL/L (ref 7–15)
AST SERPL W P-5'-P-CCNC: 13 U/L (ref 0–45)
BASOPHILS # BLD AUTO: 0.1 10E3/UL (ref 0–0.2)
BASOPHILS NFR BLD AUTO: 2 %
BILIRUB SERPL-MCNC: 0.3 MG/DL
BUN SERPL-MCNC: 10.4 MG/DL (ref 6–20)
CALCIUM SERPL-MCNC: 9.4 MG/DL (ref 8.8–10.4)
CD3 CELLS # BLD: 3157 CELLS/UL (ref 603–2990)
CD3 CELLS NFR BLD: 89 % (ref 49–84)
CD3+CD4+ CELLS # BLD: 1597 CELLS/UL (ref 441–2156)
CD3+CD4+ CELLS NFR BLD: 45 % (ref 28–63)
CD3+CD4+ CELLS/CD3+CD8+ CLL BLD: 0.96 % (ref 1.4–2.6)
CD3+CD8+ CELLS # BLD: 1664 CELLS/UL (ref 125–1312)
CD3+CD8+ CELLS NFR BLD: 47 % (ref 10–40)
CHLORIDE SERPL-SCNC: 104 MMOL/L (ref 98–107)
CHOLEST SERPL-MCNC: 247 MG/DL
CREAT SERPL-MCNC: 1.22 MG/DL (ref 0.67–1.17)
EGFRCR SERPLBLD CKD-EPI 2021: 69 ML/MIN/1.73M2
EOSINOPHIL # BLD AUTO: 0.4 10E3/UL (ref 0–0.7)
EOSINOPHIL NFR BLD AUTO: 5 %
ERYTHROCYTE [DISTWIDTH] IN BLOOD BY AUTOMATED COUNT: 13.5 % (ref 10–15)
FASTING STATUS PATIENT QL REPORTED: YES
FASTING STATUS PATIENT QL REPORTED: YES
GLUCOSE SERPL-MCNC: 128 MG/DL (ref 70–99)
HCO3 SERPL-SCNC: 20 MMOL/L (ref 22–29)
HCT VFR BLD AUTO: 39.3 % (ref 40–53)
HDLC SERPL-MCNC: 38 MG/DL
HGB BLD-MCNC: 13.5 G/DL (ref 13.3–17.7)
IMM GRANULOCYTES # BLD: 0.1 10E3/UL
IMM GRANULOCYTES NFR BLD: 1 %
LDLC SERPL CALC-MCNC: ABNORMAL MG/DL
LDLC SERPL DIRECT ASSAY-MCNC: 130 MG/DL
LIPASE SERPL-CCNC: 43 U/L (ref 13–60)
LYMPHOCYTES # BLD AUTO: 3.2 10E3/UL (ref 0.8–5.3)
LYMPHOCYTES NFR BLD AUTO: 37 %
MCH RBC QN AUTO: 31.2 PG (ref 26.5–33)
MCHC RBC AUTO-ENTMCNC: 34.4 G/DL (ref 31.5–36.5)
MCV RBC AUTO: 91 FL (ref 78–100)
MONOCYTES # BLD AUTO: 0.6 10E3/UL (ref 0–1.3)
MONOCYTES NFR BLD AUTO: 7 %
NEUTROPHILS # BLD AUTO: 4.2 10E3/UL (ref 1.6–8.3)
NEUTROPHILS NFR BLD AUTO: 49 %
NONHDLC SERPL-MCNC: 209 MG/DL
NRBC # BLD AUTO: 0 10E3/UL
NRBC BLD AUTO-RTO: 0 /100
PLATELET # BLD AUTO: 342 10E3/UL (ref 150–450)
POTASSIUM SERPL-SCNC: 4.2 MMOL/L (ref 3.4–5.3)
PROT SERPL-MCNC: 7.3 G/DL (ref 6.4–8.3)
RBC # BLD AUTO: 4.33 10E6/UL (ref 4.4–5.9)
SODIUM SERPL-SCNC: 137 MMOL/L (ref 135–145)
T CELL COMMENT: ABNORMAL
TRIGL SERPL-MCNC: 593 MG/DL
TSH SERPL DL<=0.005 MIU/L-ACNC: 3.48 UIU/ML (ref 0.3–4.2)
WBC # BLD AUTO: 8.6 10E3/UL (ref 4–11)

## 2024-10-29 PROCEDURE — 36415 COLL VENOUS BLD VENIPUNCTURE: CPT | Performed by: PATHOLOGY

## 2024-10-29 PROCEDURE — 80061 LIPID PANEL: CPT | Performed by: PATHOLOGY

## 2024-10-29 PROCEDURE — 84443 ASSAY THYROID STIM HORMONE: CPT | Performed by: PATHOLOGY

## 2024-10-29 PROCEDURE — 99000 SPECIMEN HANDLING OFFICE-LAB: CPT | Performed by: PATHOLOGY

## 2024-10-29 PROCEDURE — 83721 ASSAY OF BLOOD LIPOPROTEIN: CPT | Performed by: INTERNAL MEDICINE

## 2024-10-29 PROCEDURE — 87536 HIV-1 QUANT&REVRSE TRNSCRPJ: CPT | Performed by: INTERNAL MEDICINE

## 2024-10-29 PROCEDURE — 82150 ASSAY OF AMYLASE: CPT | Performed by: PATHOLOGY

## 2024-10-29 PROCEDURE — 86359 T CELLS TOTAL COUNT: CPT | Performed by: INTERNAL MEDICINE

## 2024-10-29 PROCEDURE — 80053 COMPREHEN METABOLIC PANEL: CPT | Performed by: PATHOLOGY

## 2024-10-29 PROCEDURE — 86593 SYPHILIS TEST NON-TREP QUANT: CPT | Performed by: INTERNAL MEDICINE

## 2024-10-29 PROCEDURE — 85025 COMPLETE CBC W/AUTO DIFF WBC: CPT | Performed by: PATHOLOGY

## 2024-10-29 PROCEDURE — 86592 SYPHILIS TEST NON-TREP QUAL: CPT | Performed by: INTERNAL MEDICINE

## 2024-10-29 PROCEDURE — 83690 ASSAY OF LIPASE: CPT | Performed by: PATHOLOGY

## 2024-10-30 DIAGNOSIS — I10 ESSENTIAL HYPERTENSION: ICD-10-CM

## 2024-10-30 DIAGNOSIS — E03.9 ACQUIRED HYPOTHYROIDISM: ICD-10-CM

## 2024-10-30 DIAGNOSIS — E78.2 MIXED HYPERLIPIDEMIA: ICD-10-CM

## 2024-10-30 RX ORDER — AMLODIPINE BESYLATE 10 MG/1
10 TABLET ORAL DAILY
Qty: 90 TABLET | Refills: 0 | Status: SHIPPED | OUTPATIENT
Start: 2024-10-30 | End: 2024-11-10

## 2024-10-30 RX ORDER — PRAVASTATIN SODIUM 40 MG
40 TABLET ORAL DAILY
Qty: 90 TABLET | Refills: 0 | Status: SHIPPED | OUTPATIENT
Start: 2024-10-30 | End: 2024-11-05 | Stop reason: ALTCHOICE

## 2024-10-30 RX ORDER — LEVOTHYROXINE SODIUM 150 UG/1
150 TABLET ORAL DAILY
Qty: 90 TABLET | Refills: 0 | Status: SHIPPED | OUTPATIENT
Start: 2024-10-30 | End: 2024-11-10

## 2024-10-30 NOTE — TELEPHONE ENCOUNTER
"Medication Refill                                                     Refill request receive for:  Pravastatin, amlodipine, levothyroxine    Last refill: 08/19/2024    Last Office Visit 2/21/2024  Future appt scheduled? Yes: 11/5     POC for medication if indicated from last note including duration of treatment if applicable: yes      RECENT LABS/VITALS                                                        Lab Results   Component Value Date    AST 13 10/29/2024    AST 33 12/11/2020     Lab Results   Component Value Date    ALT 15 10/29/2024    ALT 44 12/11/2020     Creatinine   Date Value Ref Range Status   10/29/2024 1.22 (H) 0.67 - 1.17 mg/dL Final   12/11/2020 1.19 0.66 - 1.25 mg/dL Final   ]  Alkaline Phosphatase   Date/Time Value Ref Range Status   10/29/2024 09:57 AM 68 40 - 150 U/L Final   12/11/2020 01:23 PM 76 40 - 150 U/L Final     No results found for: \"LABAPCBCDIFF\"                  "

## 2024-11-05 ENCOUNTER — OFFICE VISIT (OUTPATIENT)
Dept: INFECTIOUS DISEASES | Facility: CLINIC | Age: 59
End: 2024-11-05
Attending: INTERNAL MEDICINE
Payer: COMMERCIAL

## 2024-11-05 VITALS
HEART RATE: 103 BPM | SYSTOLIC BLOOD PRESSURE: 134 MMHG | DIASTOLIC BLOOD PRESSURE: 78 MMHG | OXYGEN SATURATION: 98 % | WEIGHT: 171 LBS | BODY MASS INDEX: 25.25 KG/M2

## 2024-11-05 DIAGNOSIS — E78.2 MIXED HYPERLIPIDEMIA: ICD-10-CM

## 2024-11-05 DIAGNOSIS — R35.1 BENIGN PROSTATIC HYPERPLASIA WITH NOCTURIA: ICD-10-CM

## 2024-11-05 DIAGNOSIS — I10 ESSENTIAL HYPERTENSION: ICD-10-CM

## 2024-11-05 DIAGNOSIS — Z87.891 PERSONAL HISTORY OF TOBACCO USE: Primary | ICD-10-CM

## 2024-11-05 DIAGNOSIS — Z21 HUMAN IMMUNODEFICIENCY VIRUS I INFECTION (H): ICD-10-CM

## 2024-11-05 DIAGNOSIS — N40.1 BENIGN PROSTATIC HYPERPLASIA WITH NOCTURIA: ICD-10-CM

## 2024-11-05 DIAGNOSIS — R14.0 POSTPRANDIAL ABDOMINAL BLOATING: ICD-10-CM

## 2024-11-05 DIAGNOSIS — E03.9 ACQUIRED HYPOTHYROIDISM: ICD-10-CM

## 2024-11-05 PROCEDURE — G2211 COMPLEX E/M VISIT ADD ON: HCPCS | Performed by: INTERNAL MEDICINE

## 2024-11-05 PROCEDURE — G0463 HOSPITAL OUTPT CLINIC VISIT: HCPCS | Performed by: INTERNAL MEDICINE

## 2024-11-05 PROCEDURE — 99214 OFFICE O/P EST MOD 30 MIN: CPT | Mod: GC | Performed by: INTERNAL MEDICINE

## 2024-11-05 RX ORDER — ATORVASTATIN CALCIUM 80 MG/1
80 TABLET, FILM COATED ORAL DAILY
Qty: 90 TABLET | Refills: 3 | Status: SHIPPED | OUTPATIENT
Start: 2024-11-05 | End: 2024-11-10

## 2024-11-05 ASSESSMENT — PAIN SCALES - GENERAL: PAINLEVEL_OUTOF10: NO PAIN (0)

## 2024-11-05 NOTE — Clinical Note
11/5/2024       RE: Dennys Bee  [address] [facility] [address]     Dear Colleague,    Thank you for referring your patient, Dennys Bee, to the [facility] INFECTIOUS DISEASE CLINIC [place] at [facility]. Please see a copy of my visit note below.      Division of Infectious Diseases and International Medicine  Department of Medicine        [facility] OUTPATIENT VISIT NOTE [place] Mail Code [address] [place] [address]  Office: [phone]  Fax:  [phone]     HISTORY OF PRESENT ILLNESS:    This 58 year old gentleman with asymptomatic HIV infection returns today to clinic for semi-annual follow-up of his antiretroviral therapy comprised of Biktarvy one tablet PO daily (switched to simplify his dosing regimen in 8/20 from Combivir one tablet PO BID plus nevirapine 200 mg PO BID which were started in 5/03), as well as multiple other health issues.  He has not missed a Biktarvy dose in a long while and reports no side effects.  His most recent last  HIV drug toxicity laboratory studies were drawn 10/29/24 and looked good, except for worsening lipid profile.  He has not been watching his diet closely and has not been exercising.  He has not left his house more than twice in the last 10 days and is sleeping 15 hours/day.  He reports too much anxiety to leave the house. He continues to have post-prandial eructation, some sensation of bloating, and occasional quite loose stools.  His most recent past colonoscopy was at [facility] in [place], [place], in 1/2019 which was overall unremarkable. He previously had been working with his [employer]formerly [employer]  to assist with finding a part-time job, however his  was fired.  He expresses ongoing concerns about finances and still would like to find a part-time job.  He continues to be followed by his  psychiatrist, Dr. Johnston in [place]. He started Aveluti that was prescribed by his Psychiatrist 2 months ago, dropped to 1/2 dose due to stomach issues while on it and does not want to continue this medication due to GI effects. He started belsomra that was prescribed by his sleep doctor a few weeks ago to help with sleep, but hasn't been helping him. He takes it inconsistently, but doesn't find any benefit on the nights he takes it. On an increased dose (since 8/2023) of pravastatin 40 mg daily, however his cholesterol has increased to 247 with LDL of 130 and triglycerides of 593 on 10/29/2024.  He has not noticed ill effects from that increased statin dose.  He has intense itching on his whole body that started around the time that he started Biktarvy. The itching comes and goes, occurs almost every day, worst at night. Has used hydrocortisone aerosol spray, which helps somewhat. No red patches or bumps, but ahs localized pruritus. Does not use lotion/moisturizer.    PAST MEDICAL HISTORY:    HIV, diagnosed 7/93, previously treated with AZT, 3TC and Crixivan. He was then on AZT plus efavirenz, but in 5/03 began Combivir/Sustiva. Started Biktarvy 8/20 and remains virally suppressed.  Stable chronic stage 1 kidney disease:  Nephrology Clinic evaluation (including negative MRI scan) unremarkable in 2009.  Hyperlipidemia previously requiring multiple medications, more hypertriglyceridemia than hypercholesterolemia -- now on fenofibrate plus pravastatin  Hyperthyroidism on levothyroxine.   History of difficult-to-treat anxiety and depression -- followed for a number of years by psychiatrist Dr. Abebe Johnston in [place].   Hypertension treated with Vasotec.   Acute appendicitis with rupture in the summer of 2007.  Cholecystectomy in the summer of 2007.   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  Followed there annually.    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  Previously received Sculptra.   History of a low testosterone level, most recently checked in 1/09 with a normal free testosterone of 14.0 and a total testosterone in the normal range of 482, managed somewhat unorthodoxly by a now-retired Urologist until 7/13.  Since then, has continued taking biweekly home exogenous testosterone IM injections and has tapered them only marginally from ~ weekly to ~ every other week (or a bit less frequent).  Total testosterone level on 5/14/14 was 2926.  Syphilis diagnosed 5/08, peak titer of 1:256, completed a treatment course of IM penicillin times 3, last dose in 6/08.  Prior rectal warts, treated with Aldara cream.  Infected lower left cracked mandibular molar tooth, repaired 6/10.  Unintentional overdose of alcohol, hydrocodone, and clonazepam for which hospitalized in [place] in 10/12.  Mild intermittent controlled extensor limb psoriasis.  Sleep apnea, diagnosed elsewhere.  Inspire implant inserted on 2/23/23.    Past Surgical History:   Procedure Laterality Date    APPENDECTOMY OPEN  Summer 2007.    For acute appendicitis with rupture.    CHOLECYSTECTOMY  2007.     ALLERGIES:  Penicillin and iodine.    CURRENT MEDICATIONS:  Current Outpatient Medications   Medication Sig Dispense Refill    amLODIPine (NORVASC) 10 MG tablet TAKE ONE TABLET BY MOUTH ONCE DAILY 90 tablet 0    BIKTARVY -25 MG per tablet TAKE ONE TABLET BY MOUTH ONCE DAILY 90 tablet 3    buPROPion (WELLBUTRIN XL) 150 MG 24 hr tablet 150 mg daily Patient takes 3 tabs daily for total 450mg      enalapril (VASOTEC) 20 MG tablet TAKE ONE TABLET BY MOUTH TWICE A  tablet 0    fenofibrate (LOFIBRA) 54 MG tablet TAKE TWO TABLETS BY MOUTH ONCE DAILY 180 tablet 0    hydrOXYzine (ATARAX) 25 MG tablet       lamoTRIgine (LAMICTAL) 100 MG tablet       levothyroxine (SYNTHROID/LEVOTHROID)  150 MCG tablet TAKE ONE TABLET BY MOUTH ONCE DAILY 90 tablet 0    multivitamin w/minerals (THERA-VIT-M) tablet Take 1 tablet by mouth daily      pravastatin (PRAVACHOL) 40 MG tablet TAKE ONE TABLET BY MOUTH ONCE DAILY 90 tablet 0    tadalafil (CIALIS) 20 MG tablet Take 1 tablet (20 mg) by mouth daily as needed (take 2 hours before intercourse) 30 tablet 11    tamsulosin (FLOMAX) 0.4 MG capsule TAKE TWO CAPSULES BY MOUTH EVERY EVENING 180 capsule 0    testosterone (AXIRON) 30 MG/ACT topical solution APPLY 1 PUMP (30 MG) ONTO SKIN DAILY. 270 mL 5    traZODone (DESYREL) 50 MG tablet        FAMILY HISTORY:  Family History   Problem Relation Age of Onset    Depression Father     Hyperlipidemia Father     Heart Disease Maternal Grandmother     Myocardial Infarction Maternal Grandfather         Sudden MI / death at age 51.    Hyperlipidemia Maternal Grandfather     Obesity Paternal Grandmother     Depression Other         Multiple family members.    Hyperlipidemia Other         Multiple family members (Parents, brother, sister, PGM)    Retinitis pigmentosa Maternal Uncle     Glaucoma No family hx of     Macular Degeneration No family hx of      SOCIAL HISTORY:    Smoking: Smokes 1/2-1PPD for the last ~40years. Has tried hypnosis. Has nicotine gum, but hasn't tried it.   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  Not sexually active and no concern for STI.    REVIEW OF SYSTEMS:  As per HPI.    Answers submitted by the patient for this visit:  Symptoms you have experienced in the last 30 days (Submitted on 8/10/2023)  General Symptoms: Yes  Skin Symptoms: Yes  HENT Symptoms: Yes  EYE SYMPTOMS: No  HEART SYMPTOMS: No  LUNG SYMPTOMS: Yes  INTESTINAL SYMPTOMS: Yes  URINARY SYMPTOMS: Yes  REPRODUCTIVE SYMPTOMS: No  SKELETAL SYMPTOMS: No  BLOOD SYMPTOMS: No  NERVOUS SYSTEM SYMPTOMS: No  MENTAL HEALTH SYMPTOMS: Yes  Please answer the questions below to tell us what conditions you are experiencing:  (Submitted on 8/10/2023)  Ear pain: No  Ear discharge: No  Hearing loss: No  Tinnitus: No  Nosebleeds: No  Congestion: No  Sinus pain: No  Trouble swallowing: No   Voice hoarseness: No  Mouth sores: No  Sore throat: No  Tooth pain: Yes  Gum tenderness: No  Bleeding gums: No  Change in taste: No  Change in sense of smell: No  Dry mouth: No  Hearing aid used: No  Neck lump: No  Please answer the questions below to tell us what conditions you are experiencing: (Submitted on 8/10/2023)  Fever: No  Loss of appetite: No  Weight gain: Yes  Fatigue: Yes  Night sweats: No  Chills: No  Increased stress: Yes  Excessive hunger: No  Excessive thirst: No  Feeling hot or cold when others believe the temperature is normal: No  Loss of height: No  Post-operative complications: No  Surgical site pain: No  Hallucinations: No  Change in or Loss of Energy: Yes  Hyperactivity: No  Confusion: No   (Submitted on 8/10/2023)  Changes in hair: No  Changes in moles/birth marks: No  Itching: Yes  Rashes: No  Changes in nails: No  Acne: No  Change in facial hair: No  Warts: No  Non-healing sores: No  Scarring: No  Flaking of skin: No  Color changes of hands/feet in cold : No  Sun sensitivity: No  Skin thickening: No  Please answer the questions below to tell us what condition you are experiencing: (Submitted on 8/10/2023)  Cough: Yes  Sputum or phlegm: No  Coughing up blood: No  Difficulty breating or shortness of breath: No  Snoring: Yes  Wheezing: No  Difficulty breathing on exertion: No  Nighttime Cough: Yes  Difficulty breathing when lying flat: No  Please answer the questions below to tell us what conditions you are experiencing: (Submitted on 8/10/2023)  Heart burn or indigestion: No  Nausea: No  Vomiting: No  Abdominal pain: No  Bloating: No  Constipation: No  Diarrhea: Yes  Blood in stool: No  Black stools: No  Rectal or Anal pain: No  Fecal incontinence: No  Yellowing of skin or eyes: No  Vomit with blood: No  Change in stools:  No  Please answer the questions below to tell us what condition you are experiencing: (Submitted on 8/10/2023)  Trouble holding urine or incontinence: No  Pain or burning: No  Trouble starting or stopping: No  Increased frequency of urination: Yes  Blood in urine: No  Decreased frequency of urination: No  Frequent nighttime urination: Yes  Flank pain: No  Difficulty emptying bladder: Yes  Please answer the questions below to tell us what condition you are experiencing: (Submitted on 8/10/2023)  Nervous or Anxious: Yes  Depression: Yes  Trouble sleeping: Yes  Trouble thinking or concentrating: Yes  Mood changes: Yes  Panic attacks: Yes    PHYSICAL EXAMINATION:  General:  A pleasant, conversant, WDWN 58-year-old man in no discomfort.  Vital Signs:  /78   Pulse 103   Wt 77.6 kg (171 lb)   SpO2 98%   BMI 25.25 kg/m   (weight is stable).  Skin:  No new rash or lesion.  There are two small (~ 3 cm diameter) patches of bilateral posterior elbow psoriasis without other lesions elsewhere.  Sclerae are white.  Conjunctivae are pink bilaterally.   Lungs:  Bilaterally clear to auscultation.  CV:  RRR, normal S1, S2 without gallop, murmur or rub.  Abdomen:  Bowel sounds normal, soft, nontender, no hepatosplenomegaly, mildly distended.  Extremities:  Distally warm, no edema.  Neurological:  Alert, oriented, memory / cognition / affect intact.  Gait is normal.    LABORATORY STUDIES (Reviewed with the patient during his appointment today):    T Cell Subset:  Absolute CD4   Date Value Ref Range Status   12/11/2020 1,087 441 - 2,156 cells/uL Final     Absolute CD4, [place] T Cells   Date Value Ref Range Status   10/29/2024 1,597 441 - 2,156 cells/uL Final     CD4 [place] T   Date Value Ref Range Status   12/11/2020 35 28 - 63 % Final     CD4% [place] T Cells   Date Value Ref Range Status   10/29/2024 45 28 - 63 % Final     CD8 Suppressor T   Date Value Ref Range Status   12/11/2020 58 (H) 10 - 40 % Final     CD8% Suppressor T  Cells   Date Value Ref Range Status   10/29/2024 47 (H) 10 - 40 % Final     CD3 Mature T   Date Value Ref Range Status   12/11/2020 89 (H) 49 - 84 % Final     CD3% Total T Cells   Date Value Ref Range Status   10/29/2024 89 (H) 49 - 84 % Final     CD4:CD8 Ratio   Date Value Ref Range Status   10/29/2024 0.96 (L) 1.40 - 2.60 Final   12/11/2020 0.60 (L) 1.40 - 2.60 Final     WBC   Date Value Ref Range Status   12/11/2020 8.2 4.0 - 11.0 10e9/L Final     WBC Count   Date Value Ref Range Status   10/29/2024 8.6 4.0 - 11.0 10e3/uL Final     % Lymphocytes   Date Value Ref Range Status   10/29/2024 37 % Final   12/11/2020 32.9 % Final     Absolute CD3   Date Value Ref Range Status   12/11/2020 2,747 603 - 2,990 cells/uL Final     Absolute CD3, Total T Cells   Date Value Ref Range Status   10/29/2024 3,157 (H) 603 - 2,990 cells/uL Final     Absolute CD8   Date Value Ref Range Status   12/11/2020 1,786 (H) 125 - 1,312 cells/uL Final     Absolute CD8, Suppressor T Cells   Date Value Ref Range Status   10/29/2024 1,664 (H) 125 - 1,312 cells/uL Final     HIV-1 RNA Quantitative:  HIV-1 RNA Quant Result   Date Value Ref Range Status   12/11/2020 HIV-1 RNA Not Detected HIVND^HIV-1 RNA Not Detected [Copies]/mL Final     Comment:     The BEV AmpliPrep/BEV TaqMan HIV-1 test is an FDA-approved in vitro   nucleic acid amplification test for the quantitation of HIV-1 RNA in human   plasma (EDTA plasma) using the BEV AmpliPrep instrument for automated viral   nucleic acid extraction and the BEV TaqMan Analyzer or BEV TaqMan for   automated Real Time PCR amplification and detection of the viral nucleic acid   target.  Titer results are reported in copies/ml. This assay is intended for use in   conjunction with clinical presentation and other laboratory markers of disease   prognosis and for use as an aid in assessing viral response to antiretroviral   treatment as measured by changes in plasma HIV-1 RNA levels. This test should    not be used as a donor screening test to confirm the presence of HIV-1   infection.       HIV-1 RNA copies/mL   Date Value Ref Range Status   10/29/2024 Not Detected Not Detected Copies/mL Final   02/10/2023 Not Detected Not Detected Copies/mL Final      TSH 10/19/2023 2.86  0.30 - 4.20 uIU/mL Final    Cholesterol 10/19/2023 173  <200 mg/dL Final    Triglycerides 10/19/2023 251 (H)  <150 mg/dL Final    Direct Measure HDL 10/19/2023 43  >=40 mg/dL Final    LDL Cholesterol Calculated 10/19/2023 80  <=100 mg/dL Final    Non HDL Cholesterol 10/19/2023 130 (H)  <130 mg/dL Final    Creatinine 10/19/2023 1.13  0.67 - 1.17 mg/dL Final    GFR Estimate 10/19/2023 76  >60 mL/min/1.73m2 Final    Hemoglobin A1C 10/19/2023 6.2 (H)  <5.7 % Final    Normal <5.7%   Prediabetes 5.7-6.4%    Diabetes 6.5% or higher     Note: Adopted from ADA consensus guidelines.     ASSESSMENT/PLAN:    Asymptomatic, well-controlled HIV infection:  Mr. Bee takes Biktarvy (switched in 6/20 from long-standing Combivir / nevirapine) with tight dosing adherence and good drug tolerance.  He has a steadily normal absolute CD4+ cell count and an undetectable HIV plasma viral load.  He will continue taking Biktarvy and return to [facility] for follow-up in a half year (with HIV and other monitoring laboratory studies in two months from now).  Pre-diabetes:  His most recent hemoglobin A1C was 6.2% in 4/2024.  He been increasingly sedentary. We discussed trying to do 5 min of exercise per day. Will monitor.  Worsening mixed hyperlipidemia:  On an increased (in 8/2023) dose of pravastatin to 40 mg daily and ongoing fenofibrate 108 mg (two 54 mg tablets) PO daily (since 2/15/23). His cholesterol has increased to 247 with LDL of 130 and triglycerides of 593 on 10/29/2024. Will discontinue pravastatin and start atorvastatin 80mg daily. Discussed that he should reach out if he develops myalgias.  Treated hypothyroidism on levothyroxine: His  "levothyroxine dose was increased back up to 150 mcg daily at his 8/2023 appointment. TSH from 10/29/24 3.48.   Obstructive sleep apnea s/p placement of Inspire device:  Having not tolerated a number fo other therapy attempts (including CPap), he had  an Inspire device implanted by a physician at [facility] in [place] on 2/23/23.  He still does not feel more energetic, but a follow-up sleep study at [facility] showed a marked decrease in the number of sleep interruptions per hour (from 18 pre- to 1.7 post-Inspire) and the device settings are still being \"tweaked\" he says. He started a new medication called belsomra prescribed by his sleep doctor, but does not feel it is helping.  Eructation / intermittent loose stools:  Describes post-prandial fullness and diarrhea. Last colonoscopy 2019 unremarkable. Will place GI referral and upper and lower endoscopy for further evaluation.  Previously elevated creatinine:  The 8/11/23 random creatinine was 1.33, increased from 1.20 on 2/10/23, but is normalized on the next 10/19/23 blood draw down to 76.  Will monitor..  Chronic, intractable depression / anxiety / insomnia / agoraphobia -- presently apparently worse:  Managed by Dr. Johnston, his psychiatrist in the [facility] in [place], who he sees often.  Will defer to Dr. Johnston.    Nocturia / presumed benign prostatism / history of Peyronie's disease:  He has had  symptoms consistent with BPH and had palpable prostatic enlargement on 10/18/17 digital rectal exam with a normal PSA screen at that time, so has now been on Flomax 0.8 mg PO q evening (since 6/22 through Urology Clinic) with, apparently some improvement, since the nocturia and daytime frequency were again not complained of today.  He was seen by Dr. Irwin in Urology clinic on 6/3/22 and underwent cystoscopy which was unremarkable.  Well-controlled essential hypertension:  On ongoing amlodipine 10 mg PO daily (increased 5/17/17, started 5/20/15) and " enalapril 20 mg PO BID (started 11/29/11 and dose increased 1/27/15) his clinic check-in blood pressures remain consistently good.    Testosterone replacement therapy / previous erectile dysfunction:  His most recent total testosterone level on 8/12/22 remains normal at 452 on ongoing Axiron transcutaneous testosterone topical replacement therapy, initially at two (30 mg) pumps (= 60 mg) daily (since late 1/15), but now at one pump daily since mid-2019.  The 8/11/23 total testosterone level was good at 535.   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  This was another reason he switched off zidovudine in summer 2020, although there has not been any evident progression of his lipodystrophy for a number of years.  Mild bilateral elbows psoriasis, presently:  This is generally well-controlled with topical Clobex prn, and the present bilateral patches are small.  Generalized pruritus: Intermittent pruritus worse in evenings. No rash on skin apart from psoriasis. Describes localized pruritus that changes location (ie. Sometimes on his legs, then arms, then back). Improves with hydrocortisone aerosol spray. Discussed following-up with Dermatology.  Health Care Maintenance:  Covid-19 and influenza vaccinations are up to date.  Jynneos Mpox vaccines were given on 8/17/22 and 9/21/22.  Menactra vaccine given 11/18/15.  Tdap was boosted 1/21/15.  Prevnar 13 given 10/16/13; Pneumovax given 1/21/15.  Shingrix vaccinations were given on 8/17/22 and 10/12/22.  HAV / HBV immunized / seroimmune.  HCV negative 6/1/09.  Negative rectal Pap done by Dr. Sanchez 6/28/10.  Antitreponemal antibody positive since 8/27/09 but repeat RPR was again (nearly) negative on 8/11/23 with a titer of 1:1 (one dilution off from non-reactive, which is within assay variability) -- will monitor.  Quantiferon Gold was negative 11/12/15.  Sees a  dentist annually.  Saw an ophthalmologist in early 2012.  Had a  non-[facility] dermatologist appointment for a complete skin check in early 2019 and was told he had only chronic lentiga without any concerning lesions.  Underwent a routine screening colonoscopy at [facility] in [place], [place], in 1/19 which was normal (without polyps, per the patient) except for diverticulosis  -- encouraged to intake more fiber.  Given 40PPD smoking history, discussed lung cancer screening, which patient agreed with.  Financial concerns: He previously had been working with his [employer]  to assist with finding a part-time job, however his  was fired.  He expresses ongoing concerns about finances and still would like to find a part-time job. Will have  reach out.    Patient seen and discussed with Dr. Latham.     Yue Lamb MD  Internal Medicine, PGY2          Again, thank you for allowing me to participate in the care of your patient.      Sincerely,    Rc Latham MD

## 2024-11-05 NOTE — NURSING NOTE
"Chief Complaint   Patient presents with    RECHECK     B20      Vital signs:      BP: 134/78 Pulse: 103     SpO2: 98 %       Weight: 77.6 kg (171 lb)  Estimated body mass index is 25.25 kg/m  as calculated from the following:    Height as of 2/21/24: 1.753 m (5' 9\").    Weight as of this encounter: 77.6 kg (171 lb).      Dari Ayon CMA   11/5/2024 3:50 PM    "

## 2024-11-05 NOTE — PATIENT INSTRUCTIONS
Lung Cancer Screening   Frequently Asked Questions  If you are at high-risk for lung cancer, getting screened with low-dose computed tomography (LDCT) every year can help save your life. This handout offers answers to some of the most common questions about lung cancer screening. If you have other questions, please call [phone] ([phone]).     What is it?  Lung cancer screening uses special X-ray technology to create an image of your lung tissue. The exam is quick and easy and takes less than 10 seconds. We don t give you any medicine or use any needles. You can eat before and after the exam. You don t need to change your clothes as long as the clothing on your chest doesn t contain metal. But, you do need to be able to hold your breath for at least 6 seconds during the exam.    What is the goal of lung cancer screening?  The goal of lung cancer screening is to save lives. Many times, lung cancer is not found until a person starts having physical symptoms. Lung cancer screening can help detect lung cancer in the earliest stages when it may be easier to treat.    Who should be screened for lung cancer?  We suggest lung cancer screening for anyone who is at high-risk for lung cancer. You are in the high-risk group if you:      are between the ages of 55 and 79, and    have smoked at least 1 pack of cigarettes a day for 20 or more years, and    still smoke or have quit within the past 15 years.    However, if you have a new cough or shortness of breath, you should talk to your doctor before being screened.    Why does it matter if I have symptoms?  Certain symptoms can be a sign that you have a condition in your lungs that should be checked and treated by your doctor. These symptoms include fever, chest pain, a new or changing cough, shortness of breath that you have never felt before, coughing up blood or unexplained weight loss. Having any of these symptoms can greatly affect the results of lung  cancer screening.       Should all smokers get an LDCT lung cancer screening exam?  It depends. Lung cancer screening is for a very specific group of men and women who have a history of heavy smoking over a long period of time (see  Who should be screened for lung cancer  above).  I am in the high-risk group, but have been diagnosed with cancer in the past. Is LDCT lung cancer screening right for me?  In some cases, you should not have LDCT lung screening, such as when your doctor is already following your cancer with CT scan studies. Your doctor will help you decide if LDCT lung screening is right for you.  Do I need to have a screening exam every year?  Yes. If you are in the high-risk group described earlier, you should get an LDCT lung cancer screening exam every year until you are 79, or are no longer willing or able to undergo screening and possible procedures to diagnose and treat lung cancer.  How effective is LDCT at preventing death from lung cancer?  Studies have shown that LDCT lung cancer screening can lower the risk of death from lung cancer by 20 percent in people who are at high-risk.  What are the risks?  There are some risks and limitations of LDCT lung cancer screening. We want to make sure you understand the risks and benefits, so please let us know if you have any questions. Your doctor may want to talk with you more about these risks.    Radiation exposure: As with any exam that uses radiation, there is a very small increased risk of cancer. The amount of radiation in LDCT is small--about the same amount a person would get from a mammogram. Your doctor orders the exam when he or she feels the potential benefits outweigh the risks.    False negatives: No test is perfect, including LDCT. It is possible that you may have a medical condition, including lung cancer, that is not found during your exam. This is called a false negative result.    False positives and more testing: LDCT very often finds  something in the lung that could be cancer, but in fact is not. This is called a false positive result. False positive tests often cause anxiety. To make sure these findings are not cancer, you may need to have more tests. These tests will be done only if you give us permission. Sometimes patients need a treatment that can have side effects, such as a biopsy. For more information on false positives, see  What can I expect from the results?     Findings not related to lung cancer: Your LDCT exam also takes pictures of areas of your body next to your lungs. In a very small number of cases, the CT scan will show an abnormal finding in one of these areas, such as your kidneys, adrenal glands, liver or thyroid. This finding may not be serious, but you may need more tests. Your doctor can help you decide what other tests you may need, if any.  What can I expect from the results?  About 1 out of 4 LDCT exams will find something that may need more tests. Most of the time, these findings are lung nodules. Lung nodules are very small collections of tissue in the lung. These nodules are very common, and the vast majority--more than 97 percent--are not cancer (benign). Most are normal lymph nodes or small areas of scarring from past infections.  But, if a small lung nodule is found to be cancer, the cancer can be cured more than 90 percent of the time. To know if the nodule is cancer, we may need to get more images before your next yearly screening exam. If the nodule has suspicious features (for example, it is large, has an odd shape or grows over time), we will refer you to a specialist for further testing.  Will my doctor also get the results?  Yes. Your doctor will get a copy of your results.  Is it okay to keep smoking now that there s a cancer screening exam?  No. Tobacco is one of the strongest cancer-causing agents. It causes not only lung cancer, but other cancers and cardiovascular (heart) diseases as well. The damage  caused by smoking builds over time. This means that the longer you smoke, the higher your risk of disease. While it is never too late to quit, the sooner you quit, the better.  Where can I find help to quit smoking?  The best way to prevent lung cancer is to stop smoking. If you have already quit smoking, congratulations and keep it up! For help on quitting smoking, please call [employer] at 6-536-QUITNOW (1-623.990.1704) or the [employer] at 1-609.585.6265 to find local resources near you.  One-on-one health coaching:  If you d prefer to work individually with a health care provider on tobacco cessation, we offer:      Medication Therapy Management:  Our specially trained pharmacists work closely with you and your doctor to help you quit smoking.  Call [phone] or [phone] (toll free).

## 2024-11-05 NOTE — PROGRESS NOTES
Division of Infectious Diseases and International Medicine  Department of Medicine        [facility] OUTPATIENT VISIT NOTE [place] [address]  [address] [place] [address]  Office: [phone]  Fax:  [phone]     HISTORY OF PRESENT ILLNESS:    This 58 year old gentleman with asymptomatic HIV infection returns today to clinic for semi-annual follow-up of his antiretroviral therapy comprised of Biktarvy one tablet PO daily (switched to simplify his dosing regimen in 8/20 from Combivir one tablet PO BID plus nevirapine 200 mg PO BID which were started in 5/03), as well as multiple other health issues.  He has not missed a Biktarvy dose in a long while and reports no side effects.  His most recent last  HIV drug toxicity laboratory studies were drawn 10/29/24 and looked good, except for worsening lipid profile.  He has not been watching his diet closely and has not been exercising.  He has not left his house more than twice in the last 10 days and is sleeping 15 hours/day.  He reports too much anxiety to leave the house. He continues to have post-prandial eructation, some sensation of bloating, and occasional quite loose stools.  His most recent past colonoscopy was at [facility] in [place], [place], in 1/2019 which was overall unremarkable. He previously had been working with his [employer]  to assist with finding a part-time job, however his  was fired.  He expresses ongoing concerns about finances and still would like to find a part-time job.  He continues to be followed by his psychiatrist, Dr. Johnston in [place]. He started Aveluti that was prescribed by his Psychiatrist 2 months ago, dropped to 1/2 dose due to stomach issues while on it and does not want to continue this medication due to GI effects. He started belsomra that was prescribed by his sleep doctor a few weeks ago to help with sleep, but hasn't  been helping him. He takes it inconsistently, but doesn't find any benefit on the nights he takes it. On an increased dose (since 8/2023) of pravastatin 40 mg daily, however his cholesterol has increased to 247 with LDL of 130 and triglycerides of 593 on 10/29/2024.  He has not noticed ill effects from that increased statin dose.  He has intense itching on his whole body that started around the time that he started Biktarvy. The itching comes and goes, occurs almost every day, worst at night. Has used hydrocortisone aerosol spray, which helps somewhat. No red patches or bumps, but ahs localized pruritus. Does not use lotion/moisturizer.    PAST MEDICAL HISTORY:    HIV, diagnosed 7/93, previously treated with AZT, 3TC and Crixivan. He was then on AZT plus efavirenz, but in 5/03 began Combivir/Sustiva. Started Biktarvy 8/20 and remains virally suppressed.  Stable chronic stage 1 kidney disease:  Nephrology Clinic evaluation (including negative MRI scan) unremarkable in 2009.  Hyperlipidemia previously requiring multiple medications, more hypertriglyceridemia than hypercholesterolemia -- now on fenofibrate plus pravastatin  Hyperthyroidism on levothyroxine.   History of difficult-to-treat anxiety and depression -- followed for a number of years by psychiatrist Dr. Abebe Johnston in [place].   Hypertension treated with Vasotec.   Acute appendicitis with rupture in the summer of 2007.  Cholecystectomy in the summer of 2007.   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  Followed there annually.   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  Previously received Sculptra.   History of a low testosterone level, most recently checked in 1/09 with a normal free testosterone of 14.0 and a total testosterone in the normal range of 482, managed  somewhat unorthodoxly by a now-retired Urologist until 7/13.  Since then, has continued taking biweekly home exogenous testosterone IM injections and has tapered them only marginally from ~ weekly to ~ every other week (or a bit less frequent).  Total testosterone level on 5/14/14 was 2926.  Syphilis diagnosed 5/08, peak titer of 1:256, completed a treatment course of IM penicillin times 3, last dose in 6/08.  Prior rectal warts, treated with Aldara cream.  Infected lower left cracked mandibular molar tooth, repaired 6/10.  Unintentional overdose of alcohol, hydrocodone, and clonazepam for which hospitalized in [place] in 10/12.  Mild intermittent controlled extensor limb psoriasis.  Sleep apnea, diagnosed elsewhere.  Inspire implant inserted on 2/23/23.    Past Surgical History:   Procedure Laterality Date    APPENDECTOMY OPEN  Summer 2007.    For acute appendicitis with rupture.    CHOLECYSTECTOMY  2007.     ALLERGIES:  Penicillin and iodine.    CURRENT MEDICATIONS:  Current Outpatient Medications   Medication Sig Dispense Refill    amLODIPine (NORVASC) 10 MG tablet TAKE ONE TABLET BY MOUTH ONCE DAILY 90 tablet 0    BIKTARVY -25 MG per tablet TAKE ONE TABLET BY MOUTH ONCE DAILY 90 tablet 3    buPROPion (WELLBUTRIN XL) 150 MG 24 hr tablet 150 mg daily Patient takes 3 tabs daily for total 450mg      enalapril (VASOTEC) 20 MG tablet TAKE ONE TABLET BY MOUTH TWICE A  tablet 0    fenofibrate (LOFIBRA) 54 MG tablet TAKE TWO TABLETS BY MOUTH ONCE DAILY 180 tablet 0    hydrOXYzine (ATARAX) 25 MG tablet       lamoTRIgine (LAMICTAL) 100 MG tablet       levothyroxine (SYNTHROID/LEVOTHROID) 150 MCG tablet TAKE ONE TABLET BY MOUTH ONCE DAILY 90 tablet 0    multivitamin w/minerals (THERA-VIT-M) tablet Take 1 tablet by mouth daily      pravastatin (PRAVACHOL) 40 MG tablet TAKE ONE TABLET BY MOUTH ONCE DAILY 90 tablet 0    tadalafil (CIALIS) 20 MG tablet Take 1 tablet (20 mg) by mouth daily as needed (take 2 hours  before intercourse) 30 tablet 11    tamsulosin (FLOMAX) 0.4 MG capsule TAKE TWO CAPSULES BY MOUTH EVERY EVENING 180 capsule 0    testosterone (AXIRON) 30 MG/ACT topical solution APPLY 1 PUMP (30 MG) ONTO SKIN DAILY. 270 mL 5    traZODone (DESYREL) 50 MG tablet        FAMILY HISTORY:  Family History   Problem Relation Age of Onset    Depression Father     Hyperlipidemia Father     Heart Disease Maternal Grandmother     Myocardial Infarction Maternal Grandfather         Sudden MI / death at age 51.    Hyperlipidemia Maternal Grandfather     Obesity Paternal Grandmother     Depression Other         Multiple family members.    Hyperlipidemia Other         Multiple family members (Parents, brother, sister, PGM)    Retinitis pigmentosa Maternal Uncle     Glaucoma No family hx of     Macular Degeneration No family hx of      SOCIAL HISTORY:    Smoking: Smokes 1/2-1PPD for the last ~40years. Has tried hypnosis. Has nicotine gum, but hasn't tried it.   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  Not sexually active and no concern for STI.    REVIEW OF SYSTEMS:  As per HPI.    Answers submitted by the patient for this visit:  Symptoms you have experienced in the last 30 days (Submitted on 8/10/2023)  General Symptoms: Yes  Skin Symptoms: Yes  HENT Symptoms: Yes  EYE SYMPTOMS: No  HEART SYMPTOMS: No  LUNG SYMPTOMS: Yes  INTESTINAL SYMPTOMS: Yes  URINARY SYMPTOMS: Yes  REPRODUCTIVE SYMPTOMS: No  SKELETAL SYMPTOMS: No  BLOOD SYMPTOMS: No  NERVOUS SYSTEM SYMPTOMS: No  MENTAL HEALTH SYMPTOMS: Yes  Please answer the questions below to tell us what conditions you are experiencing: (Submitted on 8/10/2023)  Ear pain: No  Ear discharge: No  Hearing loss: No  Tinnitus: No  Nosebleeds: No  Congestion: No  Sinus pain: No  Trouble swallowing: No   Voice hoarseness: No  Mouth sores: No  Sore throat: No  Tooth pain: Yes  Gum tenderness: No  Bleeding gums: No  Change in taste: No  Change in sense of smell: No  Dry  mouth: No  Hearing aid used: No  Neck lump: No  Please answer the questions below to tell us what conditions you are experiencing: (Submitted on 8/10/2023)  Fever: No  Loss of appetite: No  Weight gain: Yes  Fatigue: Yes  Night sweats: No  Chills: No  Increased stress: Yes  Excessive hunger: No  Excessive thirst: No  Feeling hot or cold when others believe the temperature is normal: No  Loss of height: No  Post-operative complications: No  Surgical site pain: No  Hallucinations: No  Change in or Loss of Energy: Yes  Hyperactivity: No  Confusion: No   (Submitted on 8/10/2023)  Changes in hair: No  Changes in moles/birth marks: No  Itching: Yes  Rashes: No  Changes in nails: No  Acne: No  Change in facial hair: No  Warts: No  Non-healing sores: No  Scarring: No  Flaking of skin: No  Color changes of hands/feet in cold : No  Sun sensitivity: No  Skin thickening: No  Please answer the questions below to tell us what condition you are experiencing: (Submitted on 8/10/2023)  Cough: Yes  Sputum or phlegm: No  Coughing up blood: No  Difficulty breating or shortness of breath: No  Snoring: Yes  Wheezing: No  Difficulty breathing on exertion: No  Nighttime Cough: Yes  Difficulty breathing when lying flat: No  Please answer the questions below to tell us what conditions you are experiencing: (Submitted on 8/10/2023)  Heart burn or indigestion: No  Nausea: No  Vomiting: No  Abdominal pain: No  Bloating: No  Constipation: No  Diarrhea: Yes  Blood in stool: No  Black stools: No  Rectal or Anal pain: No  Fecal incontinence: No  Yellowing of skin or eyes: No  Vomit with blood: No  Change in stools: No  Please answer the questions below to tell us what condition you are experiencing: (Submitted on 8/10/2023)  Trouble holding urine or incontinence: No  Pain or burning: No  Trouble starting or stopping: No  Increased frequency of urination: Yes  Blood in urine: No  Decreased frequency of urination: No  Frequent nighttime urination:  Yes  Flank pain: No  Difficulty emptying bladder: Yes  Please answer the questions below to tell us what condition you are experiencing: (Submitted on 8/10/2023)  Nervous or Anxious: Yes  Depression: Yes  Trouble sleeping: Yes  Trouble thinking or concentrating: Yes  Mood changes: Yes  Panic attacks: Yes    PHYSICAL EXAMINATION:  General:  A pleasant, conversant, WDWN 58-year-old man in no discomfort.  Vital Signs:  /78   Pulse 103   Wt 77.6 kg (171 lb)   SpO2 98%   BMI 25.25 kg/m   (weight is stable).  Skin:  No new rash or lesion.  There are two small (~ 3 cm diameter) patches of bilateral posterior elbow psoriasis without other lesions elsewhere.  Sclerae are white.  Conjunctivae are pink bilaterally.   Lungs:  Bilaterally clear to auscultation.  CV:  RRR, normal S1, S2 without gallop, murmur or rub.  Abdomen:  Bowel sounds normal, soft, nontender, no hepatosplenomegaly, mildly distended.  Extremities:  Distally warm, no edema.  Neurological:  Alert, oriented, memory / cognition / affect intact.  Gait is normal.    LABORATORY STUDIES (Reviewed with the patient during his appointment today):    T Cell Subset:  Absolute CD4   Date Value Ref Range Status   12/11/2020 1,087 441 - 2,156 cells/uL Final     Absolute CD4, Conrad T Cells   Date Value Ref Range Status   10/29/2024 1,597 441 - 2,156 cells/uL Final     CD4 Conrad T   Date Value Ref Range Status   12/11/2020 35 28 - 63 % Final     CD4% Conrad T Cells   Date Value Ref Range Status   10/29/2024 45 28 - 63 % Final     CD8 Suppressor T   Date Value Ref Range Status   12/11/2020 58 (H) 10 - 40 % Final     CD8% Suppressor T Cells   Date Value Ref Range Status   10/29/2024 47 (H) 10 - 40 % Final     CD3 Mature T   Date Value Ref Range Status   12/11/2020 89 (H) 49 - 84 % Final     CD3% Total T Cells   Date Value Ref Range Status   10/29/2024 89 (H) 49 - 84 % Final     CD4:CD8 Ratio   Date Value Ref Range Status   10/29/2024 0.96 (L) 1.40 - 2.60 Final    12/11/2020 0.60 (L) 1.40 - 2.60 Final     WBC   Date Value Ref Range Status   12/11/2020 8.2 4.0 - 11.0 10e9/L Final     WBC Count   Date Value Ref Range Status   10/29/2024 8.6 4.0 - 11.0 10e3/uL Final     % Lymphocytes   Date Value Ref Range Status   10/29/2024 37 % Final   12/11/2020 32.9 % Final     Absolute CD3   Date Value Ref Range Status   12/11/2020 2,747 603 - 2,990 cells/uL Final     Absolute CD3, Total T Cells   Date Value Ref Range Status   10/29/2024 3,157 (H) 603 - 2,990 cells/uL Final     Absolute CD8   Date Value Ref Range Status   12/11/2020 1,786 (H) 125 - 1,312 cells/uL Final     Absolute CD8, Suppressor T Cells   Date Value Ref Range Status   10/29/2024 1,664 (H) 125 - 1,312 cells/uL Final     HIV-1 RNA Quantitative:  HIV-1 RNA Quant Result   Date Value Ref Range Status   12/11/2020 HIV-1 RNA Not Detected HIVND^HIV-1 RNA Not Detected [Copies]/mL Final     Comment:     The BEV AmpliPrep/BEV TaqMan HIV-1 test is an FDA-approved in vitro   nucleic acid amplification test for the quantitation of HIV-1 RNA in human   plasma (EDTA plasma) using the BEV AmpliPrep instrument for automated viral   nucleic acid extraction and the BEV TaqMan Analyzer or BEV TaqMan for   automated Real Time PCR amplification and detection of the viral nucleic acid   target.  Titer results are reported in copies/ml. This assay is intended for use in   conjunction with clinical presentation and other laboratory markers of disease   prognosis and for use as an aid in assessing viral response to antiretroviral   treatment as measured by changes in plasma HIV-1 RNA levels. This test should   not be used as a donor screening test to confirm the presence of HIV-1   infection.       HIV-1 RNA copies/mL   Date Value Ref Range Status   10/29/2024 Not Detected Not Detected Copies/mL Final   02/10/2023 Not Detected Not Detected Copies/mL Final      TSH 10/19/2023 2.86  0.30 - 4.20 uIU/mL Final    Cholesterol 10/19/2023 173   <200 mg/dL Final    Triglycerides 10/19/2023 251 (H)  <150 mg/dL Final    Direct Measure HDL 10/19/2023 43  >=40 mg/dL Final    LDL Cholesterol Calculated 10/19/2023 80  <=100 mg/dL Final    Non HDL Cholesterol 10/19/2023 130 (H)  <130 mg/dL Final    Creatinine 10/19/2023 1.13  0.67 - 1.17 mg/dL Final    GFR Estimate 10/19/2023 76  >60 mL/min/1.73m2 Final    Hemoglobin A1C 10/19/2023 6.2 (H)  <5.7 % Final    Normal <5.7%   Prediabetes 5.7-6.4%    Diabetes 6.5% or higher     Note: Adopted from ADA consensus guidelines.     ASSESSMENT/PLAN:    Asymptomatic, well-controlled HIV infection:  Mr. Bee takes Biktarvy (switched in 6/20 from long-standing Combivir / nevirapine) with tight dosing adherence and good drug tolerance.  He has a steadily normal absolute CD4+ cell count and an undetectable HIV plasma viral load.  He will continue taking Biktarvy and return to [facility] for follow-up in a half year (with HIV and other monitoring laboratory studies in two months from now).  Pre-diabetes:  His most recent hemoglobin A1C was 6.2% in 4/2024.  He been increasingly sedentary. We discussed trying to do 5 min of exercise per day. Will monitor.  Worsening mixed hyperlipidemia:  On an increased (in 8/2023) dose of pravastatin to 40 mg daily and ongoing fenofibrate 108 mg (two 54 mg tablets) PO daily (since 2/15/23). His cholesterol has increased to 247 with LDL of 130 and triglycerides of 593 on 10/29/2024. Will discontinue pravastatin and start atorvastatin 80mg daily. Discussed that he should reach out if he develops myalgias.  Treated hypothyroidism on levothyroxine: His levothyroxine dose was increased back up to 150 mcg daily at his 8/2023 appointment. TSH from 10/29/24 3.48.   Obstructive sleep apnea s/p placement of Inspire device:  Having not tolerated a number fo other therapy attempts (including CPap), he had  an Inspire device implanted by a physician at  in [place] on 2/23/23.  He still does  "not feel more energetic, but a follow-up sleep study at [facility] showed a marked decrease in the number of sleep interruptions per hour (from 18 pre- to 1.7 post-Inspire) and the device settings are still being \"tweaked\" he says. He started a new medication called belsomra prescribed by his sleep doctor, but does not feel it is helping.  Eructation / intermittent loose stools:  Describes post-prandial fullness and diarrhea. Last colonoscopy 2019 unremarkable. Will place GI referral and upper and lower endoscopy for further evaluation.  Previously elevated creatinine:  The 8/11/23 random creatinine was 1.33, increased from 1.20 on 2/10/23, but is normalized on the next 10/19/23 blood draw down to 76.  Will monitor..  Chronic, intractable depression / anxiety / insomnia / agoraphobia -- presently apparently worse:  Managed by Dr. Johnston, his psychiatrist in the [facility] in [place], who he sees often.  Will defer to Dr. Johnston.    Nocturia / presumed benign prostatism / history of Peyronie's disease:  He has had  symptoms consistent with BPH and had palpable prostatic enlargement on 10/18/17 digital rectal exam with a normal PSA screen at that time, so has now been on Flomax 0.8 mg PO q evening (since 6/22 through Urology Clinic) with, apparently some improvement, since the nocturia and daytime frequency were again not complained of today.  He was seen by Dr. Irwin in Urology clinic on 6/3/22 and underwent cystoscopy which was unremarkable.  Well-controlled essential hypertension:  On ongoing amlodipine 10 mg PO daily (increased 5/17/17, started 5/20/15) and enalapril 20 mg PO BID (started 11/29/11 and dose increased 1/27/15) his clinic check-in blood pressures remain consistently good.    Testosterone replacement therapy / previous erectile dysfunction:  His most recent total testosterone level on 8/12/22 remains normal at 452 on ongoing Axiron transcutaneous testosterone topical replacement therapy, " initially at two (30 mg) pumps (= 60 mg) daily (since late 1/15), but now at one pump daily since mid-2019.  The 8/11/23 total testosterone level was good at 535.   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  This was another reason he switched off zidovudine in summer 2020, although there has not been any evident progression of his lipodystrophy for a number of years.  Mild bilateral elbows psoriasis, presently:  This is generally well-controlled with topical Clobex prn, and the present bilateral patches are small.  Generalized pruritus: Intermittent pruritus worse in evenings. No rash on skin apart from psoriasis. Describes localized pruritus that changes location (ie. Sometimes on his legs, then arms, then back). Improves with hydrocortisone aerosol spray. Discussed following-up with Dermatology.  Health Care Maintenance:  Covid-19 and influenza vaccinations are up to date.  Jynneos Mpox vaccines were given on 8/17/22 and 9/21/22.  Menactra vaccine given 11/18/15.  Tdap was boosted 1/21/15.  Prevnar 13 given 10/16/13; Pneumovax given 1/21/15.  Shingrix vaccinations were given on 8/17/22 and 10/12/22.  HAV / HBV immunized / seroimmune.  HCV negative 6/1/09.  Negative rectal Pap done by Dr. Sanchez 6/28/10.  Antitreponemal antibody positive since 8/27/09 but repeat RPR was again (nearly) negative on 8/11/23 with a titer of 1:1 (one dilution off from non-reactive, which is within assay variability) -- will monitor.  Quantiferon Gold was negative 11/12/15.  Sees a dentist annually.  Saw an ophthalmologist in early 2012.  Had a  non-[facility] dermatologist appointment for a complete skin check in early 2019 and was told he had only chronic lentiga without any concerning lesions.  Underwent a routine screening colonoscopy at [facility] in [place], [place], in 1/19 which was normal (without polyps, per  the patient) except for diverticulosis  -- encouraged to intake more fiber.  Given 40PPD smoking history, discussed lung cancer screening, which patient agreed with.  Financial concerns: He previously had been working with his [employer]  to assist with finding a part-time job, however his  was fired.  He expresses ongoing concerns about finances and still would like to find a part-time job. Will have  reach out.    Patient seen and discussed with Dr. Latham.     Yue Lamb MD  Internal Medicine, PGY2

## 2024-11-08 ENCOUNTER — TELEPHONE (OUTPATIENT)
Dept: GASTROENTEROLOGY | Facility: CLINIC | Age: 59
End: 2024-11-08

## 2024-11-08 NOTE — TELEPHONE ENCOUNTER
Is This A New Presentation, Or A Follow-Up?: [facility]    Phone Message    May a detailed message be left on voicemail: Yes    Reason for Call: Other: Patient is currently scheduled on 1/22, as visit type New GI Urgent. This is outside the expected timeline for this referral. Patient has been added to the waitlist.      In-person only      Action Taken: Message routed to:  Other: GI REFERRAL TRIAGE POOL     Travel Screening: Not Applicable

## 2024-11-10 RX ORDER — ATORVASTATIN CALCIUM 80 MG/1
80 TABLET, FILM COATED ORAL DAILY
Qty: 90 TABLET | Refills: 3 | Status: SHIPPED | OUTPATIENT
Start: 2024-11-05

## 2024-11-10 RX ORDER — BICTEGRAVIR SODIUM, EMTRICITABINE, AND TENOFOVIR ALAFENAMIDE FUMARATE 50; 200; 25 MG/1; MG/1; MG/1
1 TABLET ORAL DAILY
Qty: 90 TABLET | Refills: 3 | Status: SHIPPED | OUTPATIENT
Start: 2024-11-05

## 2024-11-10 RX ORDER — LEVOTHYROXINE SODIUM 150 UG/1
150 TABLET ORAL DAILY
Qty: 90 TABLET | Refills: 3 | Status: SHIPPED | OUTPATIENT
Start: 2024-11-05

## 2024-11-10 RX ORDER — FENOFIBRATE 54 MG/1
108 TABLET ORAL DAILY
Qty: 180 TABLET | Refills: 3 | Status: SHIPPED | OUTPATIENT
Start: 2024-11-05

## 2024-11-10 RX ORDER — AMLODIPINE BESYLATE 10 MG/1
10 TABLET ORAL DAILY
Qty: 90 TABLET | Refills: 3 | Status: SHIPPED | OUTPATIENT
Start: 2024-11-05

## 2024-11-10 RX ORDER — TAMSULOSIN HYDROCHLORIDE 0.4 MG/1
0.8 CAPSULE ORAL EVERY EVENING
Qty: 180 CAPSULE | Refills: 3 | Status: SHIPPED | OUTPATIENT
Start: 2024-11-05

## 2024-11-10 RX ORDER — ENALAPRIL MALEATE 20 MG/1
20 TABLET ORAL 2 TIMES DAILY
Qty: 180 TABLET | Refills: 3 | Status: SHIPPED | OUTPATIENT
Start: 2024-11-05

## 2024-11-11 NOTE — PROGRESS NOTES
[facility] Staff Note: Mr. Bee was seen, examined, and the case was discussed with Dr. Lamb, Medicine HIV Resdient -- I agree with his interval history and examination, assessment and plan in this outpatient ID / HIV Progress note. This note reflects my observations and opinions and the plan outlined fully reflects my approach. I have reviewed the available history, radiology, laboratory results, and reports with Dr. Lamb.

## 2024-11-26 NOTE — TELEPHONE ENCOUNTER
Clinic Navigator sent a [employer] message to inform the Pt that Pt is on the wait list for a sooner appointment. Clinic Navigator will reach out to the Pt via phone call or [employer] when a sooner appointment becomes available.

## 2024-12-22 ENCOUNTER — HEALTH MAINTENANCE LETTER (OUTPATIENT)
Age: 59
End: 2024-12-22

## 2024-12-23 NOTE — TELEPHONE ENCOUNTER
Patient is now scheduled within the appropriate time frame of one month for urgent triaged referrals. No rescheduling is needed anymore.    Scheduled with Gastroenterology (Edu Patton MD)  01/22/2025 at 1:00 PM     Closing encounter.      Alma Davis [facility]

## 2025-01-14 ENCOUNTER — TELEPHONE (OUTPATIENT)
Dept: GASTROENTEROLOGY | Facility: CLINIC | Age: 60
End: 2025-01-14
Payer: COMMERCIAL

## 2025-01-14 NOTE — TELEPHONE ENCOUNTER
"Endoscopy Scheduling Screen    Have you had any respiratory illness or flu-like symptoms in the last 10 days?  No    What is your communication preference for Instructions and/or Bowel Prep?   MyChart    What insurance is in the chart?  Other:  Medicare and [facility]    Ordering/Referring Provider: Yue Lamb MD in  INFECTIOUS DISEASE   (If ordering provider performs procedure, schedule with ordering provider unless otherwise instructed. )    BMI: Estimated body mass index is 25.25 kg/m  as calculated from the following:    Height as of 2/21/24: 1.753 m (5' 9\").    Weight as of 11/5/24: 77.6 kg (171 lb).     Sedation Ordered  moderate sedation.   If patient BMI > 50 do not schedule in [facility].    If patient BMI > 45 do not schedule at [facility].    Are you taking methadone or Suboxone?  NO, No RN review required.    Have you been diagnosed and are being treated for severe PTSD or severe anxiety?  NO, No RN review required.    Are you taking any prescription medications for pain 3 or more times per week?   NO, No RN review required.    Do you have a history of malignant hyperthermia?  No    (Females) Are you currently pregnant?   NA     Have you been diagnosed or told you have pulmonary hypertension?   No    Do you have an LVAD?  No    Have you been told you have moderate to severe sleep apnea?  Yes. Do you use a CPAP? No. (RN Review required for scheduling unless scheduling in Hospital.)   Inspire implant    Have you been told you have COPD, asthma, or any other lung disease?  No    Do you have any heart conditions?  No     Have you ever had or are you waiting for an organ transplant?  No. Continue scheduling, no site restrictions.    Have you had a stroke or transient ischemic attack (TIA aka \"mini stroke\" in the last 6 months?   No    Have you been diagnosed with or been told you have cirrhosis of the liver?   No.    Are you currently on dialysis?   No    Do you need assistance transferring?   No    BMI: " "Estimated body mass index is 25.25 kg/m  as calculated from the following:    Height as of 2/21/24: 1.753 m (5' 9\").    Weight as of 11/5/24: 77.6 kg (171 lb).     Is patients BMI > 40 and scheduling location [facility]?  No    Do you take an injectable or oral medication for weight loss or diabetes (excluding insulin)?  No    Do you take the medication Naltrexone?  No    Do you take blood thinners?  No       Prep   Are you currently on dialysis or do you have chronic kidney disease?  No    Do you have a diagnosis of diabetes?  No    Do you have a diagnosis of cystic fibrosis (CF)?  No    On a regular basis do you go 3 -5 days between bowel movements?  No    BMI > 40?  No    Preferred Pharmacy:    [facility] [address] [place] [address] [address] [place] [address] [address] [address]  Phone: [phone] Fax: [phone] Alternate Fax: [phone], [phone]    Final Scheduling Details     Procedure scheduled  Colonoscopy / Upper endoscopy (EGD)    Surgeon:       Date of procedure:       Pre-OP / PAC:       Location       Sedation      Patient Reminders:   You will receive a call from a Nurse to review instructions and health history.  This assessment must be completed prior to your procedure.  Failure to complete the Nurse assessment may result in the procedure being cancelled.      On the day of your procedure, please designate an adult(s) who can drive you home stay with you for the next 24 hours. The medicines used in the exam will make you sleepy. You will not be able to drive.      You cannot take public transportation, ride share services, or non-medical taxi service without a responsible caregiver.  Medical transport services are allowed with the requirement that a responsible caregiver will receive you at your destination.  We require that drivers and caregivers are confirmed prior to your procedure.    "

## 2025-01-29 ENCOUNTER — TELEPHONE (OUTPATIENT)
Dept: GASTROENTEROLOGY | Facility: CLINIC | Age: 60
End: 2025-01-29
Payer: COMMERCIAL

## 2025-01-29 NOTE — TELEPHONE ENCOUNTER
Left Voicemail (1st Attempt) and Sent Mychart (1st Attempt) for the patient to call back and schedule the following:    Appointment type: Return  Provider:   Return date: Next Available   Specialty phone number: [phone]  Additional appointment(s) needed:   Additonal Notes:     6 M Follow Up   - Prv. Seen  (Fellow)      1/29 AA

## 2025-04-09 ENCOUNTER — TELEPHONE (OUTPATIENT)
Dept: INFECTIOUS DISEASES | Facility: CLINIC | Age: 60
End: 2025-04-09
Payer: COMMERCIAL

## 2025-04-09 DIAGNOSIS — Z21 HUMAN IMMUNODEFICIENCY VIRUS I INFECTION (H): Primary | ICD-10-CM

## 2025-04-09 NOTE — TELEPHONE ENCOUNTER
[facility] Call Center    Phone Message    May a detailed message be left on voicemail: yes     Reason for Call: Other: Please confirm if orders for b20 clinic visit 5/27/25 is needed  also will he need to fast to check results of changed medication( cholesterol) ? Please update in my chart asap per patient request. Thank you      Action Taken: Other: ID    Travel Screening: Not Applicable     Date of Service:

## 2025-04-12 ENCOUNTER — HEALTH MAINTENANCE LETTER (OUTPATIENT)
Age: 60
End: 2025-04-12

## 2025-05-23 ENCOUNTER — RESULTS FOLLOW-UP (OUTPATIENT)
Dept: UROLOGY | Facility: CLINIC | Age: 60
End: 2025-05-23

## 2025-05-23 PROCEDURE — 86359 T CELLS TOTAL COUNT: CPT | Performed by: INTERNAL MEDICINE

## 2025-05-23 PROCEDURE — 87536 HIV-1 QUANT&REVRSE TRNSCRPJ: CPT | Performed by: INTERNAL MEDICINE

## 2025-05-23 PROCEDURE — 83036 HEMOGLOBIN GLYCOSYLATED A1C: CPT | Performed by: INTERNAL MEDICINE

## 2025-05-23 PROCEDURE — 99000 SPECIMEN HANDLING OFFICE-LAB: CPT | Performed by: PATHOLOGY

## 2025-05-23 NOTE — RESULT ENCOUNTER NOTE
Dear Dennys     Here are your recent test results which are NORMAL.  There are no concerns.    PSA is normal, minimal change from 2023:    Lab Results   Component Value Date    PSA 2.60 05/23/2025    PSA 2.52 06/16/2023          Thank You,    Please let me know if you have any questions!    Tuan MARTIN

## 2025-05-30 ENCOUNTER — RESULTS FOLLOW-UP (OUTPATIENT)
Dept: INFECTIOUS DISEASES | Facility: CLINIC | Age: 60
End: 2025-05-30

## 2025-07-15 ENCOUNTER — OFFICE VISIT (OUTPATIENT)
Dept: UROLOGY | Facility: CLINIC | Age: 60
End: 2025-07-15
Payer: COMMERCIAL

## 2025-07-15 VITALS
BODY MASS INDEX: 25.03 KG/M2 | HEART RATE: 86 BPM | DIASTOLIC BLOOD PRESSURE: 78 MMHG | HEIGHT: 69 IN | OXYGEN SATURATION: 97 % | SYSTOLIC BLOOD PRESSURE: 117 MMHG | WEIGHT: 169 LBS

## 2025-07-15 DIAGNOSIS — N52.9 ERECTILE DYSFUNCTION, UNSPECIFIED ERECTILE DYSFUNCTION TYPE: ICD-10-CM

## 2025-07-15 DIAGNOSIS — R35.1 BENIGN PROSTATIC HYPERPLASIA WITH NOCTURIA: Primary | ICD-10-CM

## 2025-07-15 DIAGNOSIS — N40.1 BENIGN PROSTATIC HYPERPLASIA WITH NOCTURIA: Primary | ICD-10-CM

## 2025-07-15 LAB — RESIDUAL VOLUME (RV) (EXTERNAL): NORMAL

## 2025-07-15 ASSESSMENT — PAIN SCALES - GENERAL: PAINLEVEL_OUTOF10: NO PAIN (0)

## 2025-07-15 NOTE — LETTER
7/15/2025       RE: Dennys Bee  [address]  [facility] [address]     Dear Colleague,    Thank you for referring your patient, Dennys Bee, to the [facility] at [facility]. Please see a copy of my visit note below.    Name: Dennys Bee   MRN: [id number]  YOB: 1965    Assessment and Plan:  59 year old male with bothersome lower urinary tract symptoms, ED, and history of low testosterone.     1. Benign prostatic hyperplasia with nocturia (Primary)  2. Erectile dysfunction, unspecified erectile dysfunction type    Discussed BPH treatment options including medications and minimally invasive surgical techniques, such as steam treatment, metal implant, aquablation, PAE and HoLEP.  With each of these, discussed benefits, side effects, treatment/retreatment rates with regard to patients prostate symptoms and size.     As he would like to preserve ejaculatory function, HoLEP is not a good fit at this time. Information was provided on each procedure. He will review and think about the options.     We will schedule him for a cystoscopy and TRUS to evaluate prostate size and for surgical planning.      Joanie Dumont, MS4     Physician Attestation  I, Parrish Ames MD, was present with the medical/ЕЛЕНА student who participated in the service and in the documentation of the note.  I have verified the history and personally performed the physical exam and medical decision making.  I agree with the assessment and plan of care as documented in the note.      Items personally reviewed: vitals, labs, and history and agree with the interpretation documented in the note.    Parrish Ames MD   [facility] Urology  July 15, 2025          Chief Complaint: BPH    History of Present Illness:  Dennys Bee is a 59 year old male seen in consultation from Dr. Dc miguel regarding BPH management.      Patient  "has had weak stream, dribbling, hesitancy, incomplete emptying, nocturia, and urgency for a long time.  Currently, they are voiding spontaneously. They are currently taking an alpha blocker (tamsulosin 0.8 mg) without relief. They are not taking 5-alpha reductase inhibitor, not taking bladder spasm medication. They are taking PDE5 inhibitor (cialis 5 mg daily) for ED.  Patient is not anticoagulated.    They have a history of ED on cialis which has been minimally helpful. He was previously on injections and saw more benefit. He would like to maintain ejaculatory function if possible.     Prostate size is unknown     BPH related history:  -- Urinary tract infections  -- Urinary tract stones  + Gross hematuria  -- Elevated PSA  + History of Urinary retention  -- Chronic kidney disease  -- Prior BPH procedure  -- Prior Prostate biopsy   -- History of prostate cancer  -- Family history of BPH    I reviewed internal records which in summarized above.    I reviewed internal labs, of which pertinent ones include: Hgb   Hemoglobin   Date Value Ref Range Status   05/23/2025 14.0 13.3 - 17.7 g/dL Final   12/11/2020 13.9 13.3 - 17.7 g/dL Final   , Cr   Creatinine   Date Value Ref Range Status   05/23/2025 1.34 (H) 0.67 - 1.17 mg/dL Final   12/11/2020 1.19 0.66 - 1.25 mg/dL Final   , PSA   PSA   Date Value Ref Range Status   04/13/2018 1.06 0 - 4 ug/L Final     Comment:     Assay Method:  Chemiluminescence using Siemens Vista analyzer     Prostate Specific Antigen Screen   Date Value Ref Range Status   05/23/2025 2.60 0.00 - 3.50 ng/mL Final     PSA Tumor Marker   Date Value Ref Range Status   06/16/2023 2.52 0.00 - 3.50 ng/mL Final   01/18/2022 5.39 (H) 0.00 - 4.00 ug/L Final   , No results found for: \"UA\", No components found for: \"UC\"    PVR today 18 mL          Past Medical History:  Past Medical History:   Diagnosis Date     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  Negative Pap smears since 2009.     Chronic kidney " disease      Depression, anxiety      Human immunodeficiency virus (HIV) disease (H) Diagnosed 7/93.    First treated with AZT / 3TC / Crixivan; then on AZT / efavirenz;on Combivir/Sustiva since 5/03.     Hyperlipidemia      Hypertension      Hypotestosteronism     On weekly replacement therapy through his urologist.     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     Syphilis, latent 5/08.    Treated with three weeks of benzathin penicillin.            Past Surgical History:  Past Surgical History:   Procedure Laterality Date     APPENDECTOMY OPEN  Summer 2007.    For acute appendicitis with rupture.     CHOLECYSTECTOMY  2007.            Social History:  Social History     Tobacco Use     Smoking status: Every Day     Current packs/day: 1.00     Types: Cigarettes     Smokeless tobacco: Never   Substance Use Topics     Alcohol use: Yes   >10 drinks per week         Family History:  Family History   Problem Relation Age of Onset     Depression Father      Hyperlipidemia Father      Heart Disease Maternal Grandmother      Myocardial Infarction Maternal Grandfather         Sudden MI / death at age 51.     Hyperlipidemia Maternal Grandfather      Obesity Paternal Grandmother      Depression Other         Multiple family members.     Hyperlipidemia Other         Multiple family members (Parents, brother, sister, PGM)     Retinitis pigmentosa Maternal Uncle      Glaucoma No family hx of      Macular Degeneration No family hx of             Allergies:  Allergies   Allergen Reactions     Iodinated Contrast Media Anaphylaxis     Amoxicillin-Pot Clavulanate Rash     Dye [Contrast Dye]      Iodine I-131 Tositumomab             Medications:  Current Outpatient Medications   Medication Sig Dispense Refill     atorvastatin (LIPITOR) 80 MG tablet Take 1 tablet (80 mg) by mouth daily. 90 tablet 3     bictegravir-emtricitabine-tenofovir (BIKTARVY) -25 MG  "per tablet Take 1 tablet by mouth daily. 90 tablet 3     buPROPion (WELLBUTRIN XL) 150 MG 24 hr tablet 150 mg daily Patient takes 3 tabs daily for total 450mg       enalapril (VASOTEC) 20 MG tablet Take 1 tablet (20 mg) by mouth 2 times daily. 180 tablet 3     fenofibrate (LOFIBRA) 54 MG tablet Take 2 tablets (108 mg) by mouth daily. 180 tablet 3     lamoTRIgine (LAMICTAL) 100 MG tablet        levothyroxine (SYNTHROID/LEVOTHROID) 150 MCG tablet Take 1 tablet (150 mcg) by mouth daily. 90 tablet 3     multivitamin w/minerals (THERA-VIT-M) tablet Take 1 tablet by mouth daily       tadalafil (CIALIS) 5 MG tablet Take 1 tablet (5 mg) by mouth every 24 hours. 90 tablet 3     tamsulosin (FLOMAX) 0.4 MG capsule Take 2 capsules (0.8 mg) by mouth every evening. 180 capsule 3     testosterone (AXIRON) 30 MG/ACT topical solution APPLY 1 PUMP (30 MG) ONTO SKIN DAILY. 270 mL 5     amLODIPine (NORVASC) 10 MG tablet Take 1 tablet (10 mg) by mouth daily. 90 tablet 3     atorvastatin (LIPITOR) 80 MG tablet Take 80 mg by mouth daily.       fenofibrate (LOFIBRA) 54 MG tablet Take by mouth daily.       hydrOXYzine (ATARAX) 25 MG tablet  (Patient not taking: Reported on 5/27/2025)       traZODone (DESYREL) 50 MG tablet        No current facility-administered medications for this visit.       Physical Exam:  B/P: 117/78, T: Data Unavailable, P: 86, R: Data Unavailable  Estimated body mass index is 24.96 kg/m  as calculated from the following:    Height as of this encounter: 1.753 m (5' 9\").    Weight as of this encounter: 76.7 kg (169 lb).  General: age-appropriate appearing male in NAD.      Again, thank you for allowing me to participate in the care of your patient.      Sincerely,    Parrish Ames MD    "

## 2025-07-15 NOTE — PATIENT INSTRUCTIONS
"For prostate artery embolization (PAE), this works by blocking blood flow to prostate to allow it to soften and shrink slightly to allow urine to pass easier.  This has approximately an 85% success rate after the procedure and a 15-20% retreatment rate in 5 years.  This has a very low rate of urinary incontinence.  There is no catheter or hospital stay associated with this procedure.  There is a small chance of developing retrograde ejaculation (no semen coming out of the end of the penis).    For water vapor thermal therapy (\"steam treatment\" or Rezum, this works by utilizing steam to kill some of the prostate tissue and it falls off the center to create a larger channel.  This has approximately an 80-85% success rate after the procedure and a 15-20% retreatment rate in 5 years.  This also has a very low rate of urinary incontinence.  This can be done in the office with oral sedation and a prostate block, or in the operating room under sedation.  There is a catheter for 1 week after the procedure.  There is a small chance of developing retrograde ejaculation (no semen coming out of the end of the penis).    For iTind, this works by utilizing a temporary implant that sits in the prostate for around a week. This is then taken out under sedation.  This has approximately an 80-85% success rate after the procedure and a 15-20% retreatment rate in 5 years.  This also has a very low rate of urinary incontinence.  There is very little chance of developing retrograde ejaculation (no semen coming out of the end of the penis).    For robotic waterjet ablation of the prostate (Aquablation), this uses a robotic guided water jet under ultrasound and visual guidance.  Some electrical coagulation (cautery) is used at the end to limit bleeding at the end of the procedure.  This is done under general anesthesia.  Patients are typically admitted to the hospital for 1-2 days after the procedure with the goal of removing the catheter " prior to discharge.  This has approximately an 90% success rate after the procedure and a 5-10% retreatment rate in 5 years.  There is a low chance of  incontinence after the procedure (5-10% short term) and a low rate (5-10%) of partial or complete retrograde ejaculation (no semen coming out of the end of the penis) after the procedure.    For holmium laser enucleation of the prostate (HoLEP), this uses a laser to help physically remove the inner prostate tissue from the shell.  This has approximately an 99% success rate after the procedure and a 1-2% retreatment rate in 5 years.  There is a high chance of short term incontinence for 4-6 weeks that may require pads or diapers.  At 3 months, approximately 5% of men are still having leakage and at 6 months and beyond it is 1-2%.  This procedure will take 1-3 hours under general anesthesia depending on the size of the prostate.  Patients can possibly go home the same day without a catheter or stay the night in the hospital if they prefer. Patients should expect to have retrograde ejaculation after surgery (no semen coming out of the end of the penis).

## 2025-07-15 NOTE — NURSING NOTE
Chief Complaint   Patient presents with    Benign Prostatic Hypertrophy    Erectile Dysfunction     And nocturia    Pvr is 18ml done with bladder scan   Marta Mckenzie, CMA

## 2025-07-15 NOTE — PROGRESS NOTES
Name: Dennys Bee   MRN: [id number]  YOB: 1965    Assessment and Plan:  59 year old male with bothersome lower urinary tract symptoms, ED, and history of low testosterone.     1. Benign prostatic hyperplasia with nocturia (Primary)  2. Erectile dysfunction, unspecified erectile dysfunction type    Discussed BPH treatment options including medications and minimally invasive surgical techniques, such as steam treatment, metal implant, aquablation, PAE and HoLEP.  With each of these, discussed benefits, side effects, treatment/retreatment rates with regard to patients prostate symptoms and size.     As he would like to preserve ejaculatory function, HoLEP is not a good fit at this time. Information was provided on each procedure. He will review and think about the options.     We will schedule him for a cystoscopy and TRUS to evaluate prostate size and for surgical planning.      Joanie Dumont, MS4     Physician Attestation   I, Parrish Ames MD, was present with the medical/ЕЛЕНА student who participated in the service and in the documentation of the note.  I have verified the history and personally performed the physical exam and medical decision making.  I agree with the assessment and plan of care as documented in the note.      Items personally reviewed: vitals, labs, and history and agree with the interpretation documented in the note.    Parrish Ames MD   [facility]rology  July 15, 2025          Chief Complaint: BPH    History of Present Illness:  Dennys Bee is a 59 year old male seen in consultation from Dr. Dc miguel regarding BPH management.      Patient has had weak stream, dribbling, hesitancy, incomplete emptying, nocturia, and urgency for a long time.  Currently, they are voiding spontaneously. They are currently taking an alpha blocker (tamsulosin 0.8 mg) without relief. They are not taking 5-alpha reductase inhibitor, not taking bladder spasm medication. They are taking  "PDE5 inhibitor (cialis 5 mg daily) for ED.  Patient is not anticoagulated.    They have a history of ED on cialis which has been minimally helpful. He was previously on injections and saw more benefit. He would like to maintain ejaculatory function if possible.     Prostate size is unknown     BPH related history:  -- Urinary tract infections  -- Urinary tract stones  + Gross hematuria  -- Elevated PSA  + History of Urinary retention  -- Chronic kidney disease  -- Prior BPH procedure  -- Prior Prostate biopsy   -- History of prostate cancer  -- Family history of BPH    I reviewed internal records which in summarized above.    I reviewed internal labs, of which pertinent ones include: Hgb   Hemoglobin   Date Value Ref Range Status   05/23/2025 14.0 13.3 - 17.7 g/dL Final   12/11/2020 13.9 13.3 - 17.7 g/dL Final   , Cr   Creatinine   Date Value Ref Range Status   05/23/2025 1.34 (H) 0.67 - 1.17 mg/dL Final   12/11/2020 1.19 0.66 - 1.25 mg/dL Final   , PSA   PSA   Date Value Ref Range Status   04/13/2018 1.06 0 - 4 ug/L Final     Comment:     Assay Method:  Chemiluminescence using Siemens Vista analyzer     Prostate Specific Antigen Screen   Date Value Ref Range Status   05/23/2025 2.60 0.00 - 3.50 ng/mL Final     PSA Tumor Marker   Date Value Ref Range Status   06/16/2023 2.52 0.00 - 3.50 ng/mL Final   01/18/2022 5.39 (H) 0.00 - 4.00 ug/L Final   , No results found for: \"UA\", No components found for: \"UC\"    PVR today 18 mL          Past Medical History:  Past Medical History:   Diagnosis Date    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  Negative Pap smears since 2009.    Chronic kidney disease     Depression, anxiety     Human immunodeficiency virus (HIV) disease (H) Diagnosed 7/93.    First treated with AZT / 3TC / Crixivan; then on AZT / efavirenz;on Combivir/Sustiva since 5/03.    Hyperlipidemia     Hypertension     Hypotestosteronism     On weekly replacement therapy through his urologist.    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    Syphilis, latent 5/08.    Treated with three weeks of benzathin penicillin.            Past Surgical History:  Past Surgical History:   Procedure Laterality Date    APPENDECTOMY OPEN  Summer 2007.    For acute appendicitis with rupture.    CHOLECYSTECTOMY  2007.            Social History:  Social History     Tobacco Use    Smoking status: Every Day     Current packs/day: 1.00     Types: Cigarettes    Smokeless tobacco: Never   Substance Use Topics    Alcohol use: Yes   >10 drinks per week         Family History:  Family History   Problem Relation Age of Onset    Depression Father     Hyperlipidemia Father     Heart Disease Maternal Grandmother     Myocardial Infarction Maternal Grandfather         Sudden MI / death at age 51.    Hyperlipidemia Maternal Grandfather     Obesity Paternal Grandmother     Depression Other         Multiple family members.    Hyperlipidemia Other         Multiple family members (Parents, brother, sister, PGM)    Retinitis pigmentosa Maternal Uncle     Glaucoma No family hx of     Macular Degeneration No family hx of             Allergies:  Allergies   Allergen Reactions    Iodinated Contrast Media Anaphylaxis    Amoxicillin-Pot Clavulanate Rash    Dye [Contrast Dye]     Iodine I-131 Tositumomab             Medications:  Current Outpatient Medications   Medication Sig Dispense Refill    atorvastatin (LIPITOR) 80 MG tablet Take 1 tablet (80 mg) by mouth daily. 90 tablet 3    bictegravir-emtricitabine-tenofovir (BIKTARVY) -25 MG per tablet Take 1 tablet by mouth daily. 90 tablet 3    buPROPion (WELLBUTRIN XL) 150 MG 24 hr tablet 150 mg daily Patient takes 3 tabs daily for total 450mg      enalapril (VASOTEC) 20 MG tablet Take 1 tablet (20 mg) by mouth 2 times daily. 180 tablet 3    fenofibrate (LOFIBRA) 54 MG tablet Take 2 tablets (108 mg) by mouth daily. 180 tablet 3    lamoTRIgine  "(LAMICTAL) 100 MG tablet       levothyroxine (SYNTHROID/LEVOTHROID) 150 MCG tablet Take 1 tablet (150 mcg) by mouth daily. 90 tablet 3    multivitamin w/minerals (THERA-VIT-M) tablet Take 1 tablet by mouth daily      tadalafil (CIALIS) 5 MG tablet Take 1 tablet (5 mg) by mouth every 24 hours. 90 tablet 3    tamsulosin (FLOMAX) 0.4 MG capsule Take 2 capsules (0.8 mg) by mouth every evening. 180 capsule 3    testosterone (AXIRON) 30 MG/ACT topical solution APPLY 1 PUMP (30 MG) ONTO SKIN DAILY. 270 mL 5    amLODIPine (NORVASC) 10 MG tablet Take 1 tablet (10 mg) by mouth daily. 90 tablet 3    atorvastatin (LIPITOR) 80 MG tablet Take 80 mg by mouth daily.      fenofibrate (LOFIBRA) 54 MG tablet Take by mouth daily.      hydrOXYzine (ATARAX) 25 MG tablet  (Patient not taking: Reported on 5/27/2025)      traZODone (DESYREL) 50 MG tablet        No current facility-administered medications for this visit.       Physical Exam:  B/P: 117/78, T: Data Unavailable, P: 86, R: Data Unavailable  Estimated body mass index is 24.96 kg/m  as calculated from the following:    Height as of this encounter: 1.753 m (5' 9\").    Weight as of this encounter: 76.7 kg (169 lb).  General: age-appropriate appearing male in NAD.    "

## 2025-07-16 ENCOUNTER — LAB (OUTPATIENT)
Dept: LAB | Facility: CLINIC | Age: 60
End: 2025-07-16
Payer: COMMERCIAL

## 2025-07-16 DIAGNOSIS — R14.0 POSTPRANDIAL ABDOMINAL BLOATING: ICD-10-CM

## 2025-07-16 DIAGNOSIS — R79.89 ELEVATED SERUM CREATININE: ICD-10-CM

## 2025-07-16 LAB
ALBUMIN UR-MCNC: NEGATIVE MG/DL
APPEARANCE UR: CLEAR
BILIRUB UR QL STRIP: NEGATIVE
COLOR UR AUTO: YELLOW
CREAT SERPL-MCNC: 1.18 MG/DL (ref 0.67–1.17)
EGFRCR SERPLBLD CKD-EPI 2021: 72 ML/MIN/1.73M2
GLUCOSE UR STRIP-MCNC: NEGATIVE MG/DL
HGB UR QL STRIP: NEGATIVE
KETONES UR STRIP-MCNC: NEGATIVE MG/DL
LEUKOCYTE ESTERASE UR QL STRIP: NEGATIVE
NITRATE UR QL: NEGATIVE
PH UR STRIP: 5.5 [PH] (ref 5–7)
RBC URINE: 1 /HPF
SP GR UR STRIP: 1.02 (ref 1–1.03)
UROBILINOGEN UR STRIP-MCNC: NORMAL MG/DL
WBC URINE: 2 /HPF

## 2025-07-16 PROCEDURE — 36415 COLL VENOUS BLD VENIPUNCTURE: CPT | Performed by: PATHOLOGY

## 2025-07-16 PROCEDURE — 99000 SPECIMEN HANDLING OFFICE-LAB: CPT | Performed by: PATHOLOGY

## 2025-07-16 PROCEDURE — 86364 TISS TRNSGLTMNASE EA IG CLAS: CPT | Performed by: INTERNAL MEDICINE

## 2025-07-16 PROCEDURE — 81001 URINALYSIS AUTO W/SCOPE: CPT | Performed by: PATHOLOGY

## 2025-07-16 PROCEDURE — 82565 ASSAY OF CREATININE: CPT | Performed by: PATHOLOGY

## 2025-07-16 PROCEDURE — 82784 ASSAY IGA/IGD/IGG/IGM EACH: CPT | Performed by: INTERNAL MEDICINE

## 2025-07-17 LAB
IGA SERPL-MCNC: 147 MG/DL (ref 84–499)
TTG IGA SER-ACNC: 0.3 U/ML
TTG IGG SER-ACNC: <0.6 U/ML

## 2025-07-22 ENCOUNTER — OFFICE VISIT (OUTPATIENT)
Dept: UROLOGY | Facility: CLINIC | Age: 60
End: 2025-07-22
Payer: COMMERCIAL

## 2025-07-22 VITALS
SYSTOLIC BLOOD PRESSURE: 143 MMHG | HEART RATE: 96 BPM | HEIGHT: 69 IN | WEIGHT: 165 LBS | DIASTOLIC BLOOD PRESSURE: 89 MMHG | OXYGEN SATURATION: 98 % | BODY MASS INDEX: 24.44 KG/M2

## 2025-07-22 DIAGNOSIS — N40.1 BENIGN PROSTATIC HYPERPLASIA WITH NOCTURIA: Primary | ICD-10-CM

## 2025-07-22 DIAGNOSIS — R35.1 BENIGN PROSTATIC HYPERPLASIA WITH NOCTURIA: Primary | ICD-10-CM

## 2025-07-22 DIAGNOSIS — M62.89 PELVIC FLOOR TENSION: ICD-10-CM

## 2025-07-22 LAB
ALBUMIN UR-MCNC: 30 MG/DL
APPEARANCE UR: CLEAR
BILIRUB UR QL STRIP: NEGATIVE
COLOR UR AUTO: YELLOW
GLUCOSE UR STRIP-MCNC: NEGATIVE MG/DL
HGB UR QL STRIP: NEGATIVE
KETONES UR STRIP-MCNC: NEGATIVE MG/DL
LEUKOCYTE ESTERASE UR QL STRIP: NEGATIVE
NITRATE UR QL: NEGATIVE
PH UR STRIP: 6.5 [PH] (ref 5–7)
SP GR UR STRIP: 1.01 (ref 1–1.03)
UROBILINOGEN UR STRIP-ACNC: 0.2 E.U./DL

## 2025-07-22 PROCEDURE — 81003 URINALYSIS AUTO W/O SCOPE: CPT | Mod: QW | Performed by: UROLOGY

## 2025-07-22 RX ORDER — LIDOCAINE HYDROCHLORIDE 20 MG/ML
JELLY TOPICAL ONCE
Status: COMPLETED | OUTPATIENT
Start: 2025-07-22 | End: 2025-07-22

## 2025-07-22 RX ADMIN — LIDOCAINE HYDROCHLORIDE 5 ML: 20 JELLY TOPICAL at 14:26

## 2025-07-22 ASSESSMENT — PAIN SCALES - GENERAL: PAINLEVEL_OUTOF10: NO PAIN (0)

## 2025-07-22 NOTE — NURSING NOTE
Chief Complaint   Patient presents with    Benign Prostatic Hypertrophy    Nocturia     Here in [facility] today for a cystoscopy with trans rectal ultrasound     Prior to the start of the procedure DR BARNEY and with procedural staff participation, I verbally confirmed the patient s identity using two indicators, relevant allergies, that the procedure was appropriate and matched the consent or emergent situation, and that the correct equipment/implants were available. Immediately prior to starting the procedure I conducted the Time Out with the procedural staff and re-confirmed the patient s name, procedure, and site/side. I have wiped the patient off with the povidone-Iodine solution, draped them,  used Lidocaine hydrochloride jelly, and instilled sterile water into the bladder. (The Joint Commission universal protocol was followed.)  Yes    5mL 2% lidocaine hydrochloride Urojet instilled into urethra.    NDC# [address]-7  Lot #: [id number]  Expiration Date:  12-26  Marta Mckenzie CMA

## 2025-07-22 NOTE — LETTER
7/22/2025       RE: Dennys Bee  [address]  [facility] [facility] [place] [address]     Dear Colleague,    Thank you for referring your patient, Dennys Bee, to the [facility]. Please see a copy of my visit note below.    PRE-PROCEDURE DIAGNOSIS: BPH with lower urinary tract symptoms  POST-PROCEDURE DIAGNOSIS: BPH and pelvic floor tension  PROCEDURE: Cystoscopy and transrectal ultrasound of the prostate  HISTORY: Mr. Bee is a 59 year old male with bothersome LUTS that are long standing refractory to tamsulosin.    DESCRIPTION OF PROCEDURE: After informed consent was obtained, the patient was brought to the procedure room where he was placed in the supine position with all pressure points well padded.  The penis and scrotum were prepped and draped in a sterile fashion. A flexible cystoscope was introduced through a well-lubricated urethra. Anterior urethra strictures were absent.   The urinary sphincter was intact.  The prostate demonstrated some bilobar hypertophy.  Median lobe was absent on retroflex view.  <1 cm intravesical protrusion  The bladder was unremarkable for tumors, stones.  There were no trabeculations, cellules or diverticulae.  The flexible cystoscope was removed and patient was moved to left lateral decubitus position.    A lubricated ultrasound probe was inserted into the rectum.  Measurements were taken in three dimensions and volume was estimated to be 30 ml.  JERE was normal.  The probe was removed and findings were discussed.    ASSESSMENT AND PLAN: 59 year old male with 30 ml prostate with some BPH and pelvic floor tension.    Discussed that likely he has a significant component of pelvic floor tension, especially given discomfort with scope and ultrasound.  Hx of anxiety raises likelihood.    If needs a BPH procedure would likely pursue iTIND, however would likely not tolerate the  device well.      Will try pelvic floor physical therapy for pelvic floor tension myalgia and follow-up in 3 mo.    Spent 20 minutes with patient, documentation and chart review exclusive of cystoscopy and TRUS.          Again, thank you for allowing me to participate in the care of your patient.      Sincerely,    Parrish Ames MD

## 2025-07-22 NOTE — PROGRESS NOTES
PRE-PROCEDURE DIAGNOSIS: BPH with lower urinary tract symptoms  POST-PROCEDURE DIAGNOSIS: BPH and pelvic floor tension  PROCEDURE: Cystoscopy and transrectal ultrasound of the prostate  HISTORY: Mr. Bee is a 59 year old male with bothersome LUTS that are long standing refractory to tamsulosin.    DESCRIPTION OF PROCEDURE: After informed consent was obtained, the patient was brought to the procedure room where he was placed in the supine position with all pressure points well padded.  The penis and scrotum were prepped and draped in a sterile fashion. A flexible cystoscope was introduced through a well-lubricated urethra. Anterior urethra strictures were absent.   The urinary sphincter was intact.  The prostate demonstrated some bilobar hypertophy.  Median lobe was absent on retroflex view.  <1 cm intravesical protrusion  The bladder was unremarkable for tumors, stones.  There were no trabeculations, cellules or diverticulae.  The flexible cystoscope was removed and patient was moved to left lateral decubitus position.    A lubricated ultrasound probe was inserted into the rectum.  Measurements were taken in three dimensions and volume was estimated to be 30 ml.  JERE was normal.  The probe was removed and findings were discussed.    ASSESSMENT AND PLAN: 59 year old male with 30 ml prostate with some BPH and pelvic floor tension.    Discussed that likely he has a significant component of pelvic floor tension, especially given discomfort with scope and ultrasound.  Hx of anxiety raises likelihood.    If needs a BPH procedure would likely pursue iTIND, however would likely not tolerate the device well.      Will try pelvic floor physical therapy for pelvic floor tension myalgia and follow-up in 3 mo.    Spent 20 minutes with patient, documentation and chart review exclusive of cystoscopy and TRUS.

## 2025-07-22 NOTE — PATIENT INSTRUCTIONS

## (undated) RX ORDER — LIDOCAINE HYDROCHLORIDE 20 MG/ML
JELLY TOPICAL
Status: DISPENSED
Start: 2022-06-03